# Patient Record
Sex: FEMALE | Race: WHITE | NOT HISPANIC OR LATINO | Employment: OTHER | URBAN - METROPOLITAN AREA
[De-identification: names, ages, dates, MRNs, and addresses within clinical notes are randomized per-mention and may not be internally consistent; named-entity substitution may affect disease eponyms.]

---

## 2017-05-30 RX ORDER — ESCITALOPRAM OXALATE 5 MG/1
TABLET ORAL
Qty: 30 TABLET | Refills: 2 | Status: SHIPPED | OUTPATIENT
Start: 2017-05-30 | End: 2017-07-03

## 2017-05-30 RX ORDER — ESCITALOPRAM OXALATE 5 MG/1
5 TABLET ORAL DAILY
Qty: 30 TABLET | Refills: 2 | Status: SHIPPED | OUTPATIENT
Start: 2017-05-30 | End: 2017-07-05 | Stop reason: SDUPTHER

## 2017-06-19 RX ORDER — OXYBUTYNIN CHLORIDE 5 MG/1
5 TABLET ORAL 3 TIMES DAILY
Qty: 90 TABLET | Refills: 2 | Status: SHIPPED | OUTPATIENT
Start: 2017-06-19 | End: 2017-10-02 | Stop reason: SDUPTHER

## 2017-06-19 RX ORDER — OXYBUTYNIN CHLORIDE 5 MG/1
1 TABLET ORAL 3 TIMES DAILY
COMMUNITY
End: 2017-06-19 | Stop reason: SDUPTHER

## 2017-07-03 RX ORDER — FUROSEMIDE 40 MG/1
1 TABLET ORAL
COMMUNITY
End: 2017-07-05 | Stop reason: SDUPTHER

## 2017-07-03 RX ORDER — DILTIAZEM HYDROCHLORIDE 90 MG/1
1 TABLET, FILM COATED ORAL 2 TIMES DAILY
COMMUNITY
Start: 2017-04-03 | End: 2017-07-05 | Stop reason: SDUPTHER

## 2017-07-03 RX ORDER — TERBINAFINE HYDROCHLORIDE 250 MG/1
1 TABLET ORAL DAILY
COMMUNITY
Start: 2017-02-20 | End: 2017-07-05

## 2017-07-03 RX ORDER — ATORVASTATIN CALCIUM 20 MG/1
1 TABLET, FILM COATED ORAL DAILY
COMMUNITY
Start: 2017-04-03 | End: 2017-07-05 | Stop reason: SDUPTHER

## 2017-07-03 RX ORDER — MINOCYCLINE HYDROCHLORIDE 100 MG/1
1 TABLET ORAL DAILY
COMMUNITY
End: 2017-07-05

## 2017-07-05 ENCOUNTER — OFFICE VISIT (OUTPATIENT)
Dept: FAMILY MEDICINE | Facility: CLINIC | Age: 72
End: 2017-07-05
Payer: MEDICARE

## 2017-07-05 VITALS
HEART RATE: 86 BPM | SYSTOLIC BLOOD PRESSURE: 130 MMHG | DIASTOLIC BLOOD PRESSURE: 74 MMHG | HEIGHT: 62 IN | OXYGEN SATURATION: 95 % | WEIGHT: 210 LBS | BODY MASS INDEX: 38.64 KG/M2

## 2017-07-05 DIAGNOSIS — E78.5 HYPERLIPIDEMIA, UNSPECIFIED HYPERLIPIDEMIA TYPE: ICD-10-CM

## 2017-07-05 DIAGNOSIS — F41.8 MIXED ANXIETY DEPRESSIVE DISORDER: Primary | ICD-10-CM

## 2017-07-05 DIAGNOSIS — R60.0 EDEMA OF LOWER EXTREMITY, UNSPECIFIED LATERALITY: ICD-10-CM

## 2017-07-05 DIAGNOSIS — I10 BENIGN HYPERTENSION: ICD-10-CM

## 2017-07-05 PROBLEM — F17.200 CURRENT SMOKER: Status: ACTIVE | Noted: 2017-07-05

## 2017-07-05 PROBLEM — B35.6 TINEA CRURIS: Status: ACTIVE | Noted: 2017-07-05

## 2017-07-05 PROBLEM — L98.9 SKIN LESION: Status: ACTIVE | Noted: 2017-07-05

## 2017-07-05 PROBLEM — Z86.03 HISTORY OF NEOPLASM OF BLADDER: Status: ACTIVE | Noted: 2017-07-05

## 2017-07-05 PROBLEM — B35.1 ONYCHOMYCOSIS OF TOENAIL: Status: ACTIVE | Noted: 2017-07-05

## 2017-07-05 PROBLEM — R73.03 BORDERLINE DIABETES MELLITUS: Status: ACTIVE | Noted: 2017-07-05

## 2017-07-05 PROCEDURE — 99214 OFFICE O/P EST MOD 30 MIN: CPT | Mod: ,,, | Performed by: FAMILY MEDICINE

## 2017-07-05 PROCEDURE — 1159F MED LIST DOCD IN RCRD: CPT | Mod: ,,, | Performed by: FAMILY MEDICINE

## 2017-07-05 RX ORDER — ESCITALOPRAM OXALATE 10 MG/1
10 TABLET ORAL DAILY
Qty: 90 TABLET | Refills: 1 | Status: SHIPPED | OUTPATIENT
Start: 2017-07-05 | End: 2018-07-05

## 2017-07-05 RX ORDER — DILTIAZEM HYDROCHLORIDE 90 MG/1
90 TABLET, FILM COATED ORAL 2 TIMES DAILY
Qty: 180 TABLET | Refills: 1 | Status: SHIPPED | OUTPATIENT
Start: 2017-07-05

## 2017-07-05 RX ORDER — FUROSEMIDE 40 MG/1
40 TABLET ORAL
Qty: 90 TABLET | Refills: 1 | Status: SHIPPED | OUTPATIENT
Start: 2017-07-05

## 2017-07-05 RX ORDER — ESCITALOPRAM OXALATE 5 MG/1
5 TABLET ORAL DAILY
Qty: 90 TABLET | Refills: 1 | Status: SHIPPED | OUTPATIENT
Start: 2017-07-05 | End: 2020-08-31

## 2017-07-05 RX ORDER — ATORVASTATIN CALCIUM 20 MG/1
20 TABLET, FILM COATED ORAL DAILY
Qty: 90 TABLET | Refills: 1 | Status: SHIPPED | OUTPATIENT
Start: 2017-07-05

## 2017-07-05 NOTE — PROGRESS NOTES
Subjective:       Patient ID:  Chen Levine is a 72 y.o. female with multiple medical diagnoses as listed in the medical history and problem list that presents for Hypertension; Anxiety; and Hyperlipidemia  .      Chief Complaint: Hypertension; Anxiety; and Hyperlipidemia    Hypertension   This is a chronic problem. The current episode started more than 1 year ago. The problem has been gradually improving since onset. Associated symptoms include anxiety. Pertinent negatives include no chest pain, headaches, palpitations or shortness of breath. There are no associated agents to hypertension. Risk factors for coronary artery disease include dyslipidemia. Past treatments include calcium channel blockers. The current treatment provides mild improvement. There are no compliance problems.    Anxiety   Presents for follow-up visit. Symptoms include nervous/anxious behavior. Patient reports no chest pain, confusion, dizziness, nausea, palpitations, shortness of breath or suicidal ideas. Symptoms occur most days. The severity of symptoms is moderate. The quality of sleep is fair. Nighttime awakenings: one to two.     Compliance with medications is %.   Hyperlipidemia   This is a chronic problem. The problem is uncontrolled. Recent lipid tests were reviewed and are high. There are no known factors aggravating her hyperlipidemia. Pertinent negatives include no chest pain, myalgias or shortness of breath. Current antihyperlipidemic treatment includes statins. The current treatment provides mild improvement of lipids. There are no compliance problems.      Review of Systems   Constitutional: Negative for appetite change, chills, diaphoresis, fatigue and fever.   HENT: Negative for congestion, ear pain, hearing loss, sore throat and trouble swallowing.    Eyes: Negative for photophobia, pain and visual disturbance.   Respiratory: Negative for cough, chest tightness and shortness of breath.    Cardiovascular:  Negative for chest pain, palpitations and leg swelling.   Gastrointestinal: Negative for abdominal pain, blood in stool, constipation, diarrhea, nausea and vomiting.   Endocrine: Negative for cold intolerance and heat intolerance.   Genitourinary: Negative for difficulty urinating, flank pain, pelvic pain and vaginal pain.   Musculoskeletal: Negative for arthralgias and myalgias.   Skin: Negative for rash.   Allergic/Immunologic: Negative for immunocompromised state.   Neurological: Negative for dizziness, weakness, light-headedness and headaches.   Hematological: Negative for adenopathy. Does not bruise/bleed easily.   Psychiatric/Behavioral: Negative for confusion, self-injury and suicidal ideas. The patient is nervous/anxious.        Past Medical History:   Diagnosis Date    Anxiety     Cancer     Depression     Hyperlipidemia     Hypertension      Past Surgical History:   Procedure Laterality Date    BLADDER SURGERY  1985    HYSTERECTOMY  1977    SPINE SURGERY  1987     Family History   Problem Relation Age of Onset    Cancer Mother     Vision loss Mother     Hypertension Father     Stroke Father      Social History     Social History    Marital status:      Spouse name: N/A    Number of children: N/A    Years of education: N/A     Social History Main Topics    Smoking status: Current Every Day Smoker    Smokeless tobacco: Never Used    Alcohol use No    Drug use: No    Sexual activity: Not Asked     Other Topics Concern    None     Social History Narrative    None     Current Outpatient Prescriptions   Medication Sig Dispense Refill    atorvastatin (LIPITOR) 20 MG tablet Take 1 tablet (20 mg total) by mouth Daily. 90 tablet 1    diltiaZEM (CARDIZEM) 90 MG tablet Take 1 tablet (90 mg total) by mouth 2 (two) times daily. 180 tablet 1    escitalopram oxalate (LEXAPRO) 5 MG Tab Take 1 tablet (5 mg total) by mouth once daily. 90 tablet 1    furosemide (LASIX) 40 MG tablet Take 1  tablet (40 mg total) by mouth 3 (three) times a week. 90 tablet 1    oxybutynin (DITROPAN) 5 MG Tab Take 1 tablet (5 mg total) by mouth 3 (three) times daily. 90 tablet 2    escitalopram oxalate (LEXAPRO) 10 MG tablet Take 1 tablet (10 mg total) by mouth once daily. 90 tablet 1     No current facility-administered medications for this visit.      Review of patient's allergies indicates:   Allergen Reactions    Penicillins Itching and Rash    Promethazine Itching and Rash    Sulfa (sulfonamide antibiotics) Itching and Rash     Objective:      Vitals:    07/05/17 1039   BP: 130/74   Pulse: 86     Physical Exam   Constitutional: She appears well-developed and well-nourished.   HENT:   Head: Normocephalic.   Eyes: Pupils are equal, round, and reactive to light. Right eye exhibits no discharge. Left eye exhibits no discharge. No scleral icterus.   Neck: No thyromegaly present.   Cardiovascular: Normal rate and regular rhythm.  Exam reveals no gallop and no friction rub.    No murmur heard.  Pulmonary/Chest: No respiratory distress. She has no wheezes. She has no rales. She exhibits no tenderness.   Abdominal: She exhibits no distension. There is no tenderness.   Musculoskeletal: She exhibits no tenderness.   Skin: No erythema.   Psychiatric: Her mood appears anxious. She expresses no suicidal ideation. She expresses no suicidal plans and no homicidal plans.       Assessment:       1. Mixed anxiety depressive disorder    2. Hyperlipidemia, unspecified hyperlipidemia type    3. Benign hypertension    4. Edema of lower extremity, unspecified laterality        Plan:       Mixed anxiety depressive disorder  Comments:  re-start SSRI.   Orders:  -     escitalopram oxalate (LEXAPRO) 10 MG tablet; Take 1 tablet (10 mg total) by mouth once daily.  Dispense: 90 tablet; Refill: 1  -     escitalopram oxalate (LEXAPRO) 5 MG Tab; Take 1 tablet (5 mg total) by mouth once daily.  Dispense: 90 tablet; Refill: 1    Hyperlipidemia,  unspecified hyperlipidemia type  Comments:  restart statin ( off for a while since use terbinafine).  Orders:  -     atorvastatin (LIPITOR) 20 MG tablet; Take 1 tablet (20 mg total) by mouth Daily.  Dispense: 90 tablet; Refill: 1    Benign hypertension  -     diltiaZEM (CARDIZEM) 90 MG tablet; Take 1 tablet (90 mg total) by mouth 2 (two) times daily.  Dispense: 180 tablet; Refill: 1    Edema of lower extremity, unspecified laterality  Comments:  improving after skin lesion on LE improved.   Orders:  -     furosemide (LASIX) 40 MG tablet; Take 1 tablet (40 mg total) by mouth 3 (three) times a week.  Dispense: 90 tablet; Refill: 1      Return in about 3 months (around 10/5/2017) for HTN, HLD, anxiety  with Dr. Ramirez. .      7/5/2017 Maryam Tripp M.D.

## 2017-10-04 RX ORDER — OXYBUTYNIN CHLORIDE 5 MG/1
TABLET ORAL
Qty: 90 TABLET | Refills: 0 | Status: SHIPPED | OUTPATIENT
Start: 2017-10-04 | End: 2019-09-03

## 2019-07-16 ENCOUNTER — OFFICE VISIT (OUTPATIENT)
Dept: HEMATOLOGY/ONCOLOGY | Facility: CLINIC | Age: 74
End: 2019-07-16
Payer: MEDICARE

## 2019-07-16 VITALS
SYSTOLIC BLOOD PRESSURE: 123 MMHG | HEART RATE: 73 BPM | DIASTOLIC BLOOD PRESSURE: 72 MMHG | BODY MASS INDEX: 43.68 KG/M2 | RESPIRATION RATE: 20 BRPM | HEIGHT: 60 IN | WEIGHT: 222.5 LBS | TEMPERATURE: 99 F

## 2019-07-16 DIAGNOSIS — K58.2 IRRITABLE BOWEL SYNDROME WITH BOTH CONSTIPATION AND DIARRHEA: ICD-10-CM

## 2019-07-16 DIAGNOSIS — I10 BENIGN HYPERTENSION: ICD-10-CM

## 2019-07-16 DIAGNOSIS — F41.8 MIXED ANXIETY DEPRESSIVE DISORDER: ICD-10-CM

## 2019-07-16 DIAGNOSIS — N28.89 RENAL MASS: ICD-10-CM

## 2019-07-16 DIAGNOSIS — Z86.03 HISTORY OF NEOPLASM OF BLADDER: ICD-10-CM

## 2019-07-16 DIAGNOSIS — R73.03 BORDERLINE DIABETES MELLITUS: ICD-10-CM

## 2019-07-16 DIAGNOSIS — Z80.3 FHX: BREAST CANCER IN FIRST DEGREE RELATIVE: ICD-10-CM

## 2019-07-16 DIAGNOSIS — F17.200 CURRENT SMOKER: ICD-10-CM

## 2019-07-16 DIAGNOSIS — Z12.31 ENCOUNTER FOR SCREENING MAMMOGRAM FOR BREAST CANCER: ICD-10-CM

## 2019-07-16 DIAGNOSIS — D72.9 NEUTROPHILIC LEUKOCYTOSIS: Primary | ICD-10-CM

## 2019-07-16 DIAGNOSIS — D50.9 IRON DEFICIENCY ANEMIA, UNSPECIFIED IRON DEFICIENCY ANEMIA TYPE: ICD-10-CM

## 2019-07-16 DIAGNOSIS — D51.8 ANEMIA OF DECREASED VITAMIN B12 ABSORPTION: ICD-10-CM

## 2019-07-16 DIAGNOSIS — R77.8 ELEVATED TOTAL PROTEIN: ICD-10-CM

## 2019-07-16 PROCEDURE — 1101F PR PT FALLS ASSESS DOC 0-1 FALLS W/OUT INJ PAST YR: ICD-10-PCS | Mod: ,,, | Performed by: INTERNAL MEDICINE

## 2019-07-16 PROCEDURE — 99204 PR OFFICE/OUTPT VISIT, NEW, LEVL IV, 45-59 MIN: ICD-10-PCS | Mod: ,,, | Performed by: INTERNAL MEDICINE

## 2019-07-16 PROCEDURE — 3074F PR MOST RECENT SYSTOLIC BLOOD PRESSURE < 130 MM HG: ICD-10-PCS | Mod: ,,, | Performed by: INTERNAL MEDICINE

## 2019-07-16 PROCEDURE — 3074F SYST BP LT 130 MM HG: CPT | Mod: ,,, | Performed by: INTERNAL MEDICINE

## 2019-07-16 PROCEDURE — 99204 OFFICE O/P NEW MOD 45 MIN: CPT | Mod: ,,, | Performed by: INTERNAL MEDICINE

## 2019-07-16 PROCEDURE — 1101F PT FALLS ASSESS-DOCD LE1/YR: CPT | Mod: ,,, | Performed by: INTERNAL MEDICINE

## 2019-07-16 PROCEDURE — 3078F PR MOST RECENT DIASTOLIC BLOOD PRESSURE < 80 MM HG: ICD-10-PCS | Mod: ,,, | Performed by: INTERNAL MEDICINE

## 2019-07-16 PROCEDURE — 3078F DIAST BP <80 MM HG: CPT | Mod: ,,, | Performed by: INTERNAL MEDICINE

## 2019-07-16 RX ORDER — GABAPENTIN 600 MG/1
600 TABLET ORAL 2 TIMES DAILY
COMMUNITY

## 2019-07-16 NOTE — LETTER
July 20, 2019      Adan Auguste MD  [address]  [place] [address]           [facility]  [address] [facility]  [address] [place] [id number]  Phone: [phone]  Fax: [phone]          Patient: Chen Levine   MR Number: [id number]   YOB: 1945   Date of Visit: 7/16/2019       Dear Dr. Adan Auguste:    Thank you for referring Chen Levine to me for evaluation. Attached you will find relevant portions of my assessment and plan of care.    If you have questions, please do not hesitate to call me. I look forward to following Chen Levine along with you.    Sincerely,    CARMEN Nunes MD    Enclosure  CC:  No Recipients    If you would like to receive this communication electronically, please contact [email or web address] or [phone] to request more information on [facility] access.    For providers and/or their staff who would like to refer a patient to [facility], please contact us through our one-stop-shop provider referral line, [facility], at [phone].    If you feel you have received this communication in error or would no longer like to receive these types of communications, please e-mail [email or web address]

## 2019-07-20 ENCOUNTER — TELEPHONE (OUTPATIENT)
Dept: HEMATOLOGY/ONCOLOGY | Facility: CLINIC | Age: 74
End: 2019-07-20

## 2019-07-21 NOTE — PROGRESS NOTES
[facility] History & Physical    Subjective:      Patient ID:   Chen Levine  74 y.o. female  1945  [place]      Chief Complaint:   WBC increased    HPI:  74 y.o. female with increased WBC 14,100.  She has spastic colitis hx with cramps.  HTN, asthma as a child.  Bladder cancer ,Dr. Stahl, treated with surgery.  She has stress incontinence.  DVT hx in her 20's.    S/P neck fusion.  LBP with injections, residual numbness down R anterior Leg.  Partial hysterectomy.  Onset of menses age 13.  M0.    Smoked 1/2 ppd x's 57 years.  She does not want referral to smoking clinic.ETOH no.  Allergy Phenergan, PCN, sulfa.  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    Mom breast Ca, macular degeneration.  Dad CVA.  Sister x's 2, LBP.  1 sister renal cancer.  Daughter Breast cancer, epilepsy, heart dx.  Daughter hypothyroid dx., cholesterol, 40# benign ovarian tumor.      ROS:   GEN: normal without any fever, night sweats or weight loss  HEENT: normal with no HA's, sore throat, stiff neck, changes in vision  CV: normal with no CP, SOB, PND, VIDES or orthopnea  PULM: normal with no SOB, cough, hemoptysis, sputum or pleuritic pain  GI: normal with no abdominal pain, nausea, vomiting, constipation, diarrhea, melanotic stools, BRBPR, or hematemesis  : normal with no hematuria, dysuria  BREAST: normal with no mass, discharge, pain  SKIN: normal with no rash, erythema, bruising, or swelling     Past Medical History:   Diagnosis Date    Anxiety     Cancer     Depression     Hyperlipidemia     Hypertension      Past Surgical History:   Procedure Laterality Date    BLADDER SURGERY      HYSTERECTOMY      SPINE SURGERY         Review of patient's allergies indicates:   Allergen Reactions    Penicillins Itching and Rash    Promethazine Itching and Rash    Sulfa (sulfonamide antibiotics) Itching and Rash           Current Outpatient Medications:     atorvastatin (LIPITOR) 20 MG tablet,  Take 1 tablet (20 mg total) by mouth Daily., Disp: 90 tablet, Rfl: 1    diltiaZEM (CARDIZEM) 90 MG tablet, Take 1 tablet (90 mg total) by mouth 2 (two) times daily., Disp: 180 tablet, Rfl: 1    furosemide (LASIX) 40 MG tablet, Take 1 tablet (40 mg total) by mouth 3 (three) times a week., Disp: 90 tablet, Rfl: 1    gabapentin (NEURONTIN) 600 MG tablet, Take 600 mg by mouth 3 (three) times daily., Disp: , Rfl:     oxybutynin (DITROPAN) 5 MG Tab, TAKE ONE TABLET (5MG TOTAL) BY MOUTH THREE TIMES DAILY, Disp: 90 tablet, Rfl: 0    escitalopram oxalate (LEXAPRO) 5 MG Tab, Take 1 tablet (5 mg total) by mouth once daily., Disp: 90 tablet, Rfl: 1          Objective:   Vitals:  Blood pressure 123/72, pulse 73, temperature 99 °F (37.2 °C), temperature source Oral, resp. rate 20, height 5' (1.524 m), weight 100.9 kg (222 lb 8 oz).    Physical Examination:   GEN: no apparent distress, comfortable  HEAD: atraumatic and normocephalic  EYES: no pallor, no icterus  ENT:  no pharyngeal erythema, external ears WNL; no nasal discharge  NECK: no masses, thyroid normal, trachea midline, no LAD/LN's, supple  CV: RRR with no murmur; normal pulse; normal S1 and S2; no pedal edema  CHEST: Normal respiratory effort; CTAB; normal breath sounds; no wheeze or crackles  ABDOM: nontender and nondistended; soft;  no rebound/guarding  MUSC/Skeletal: ROM normal; no crepitus; joints normal; no deformities   EXTREM: no clubbing, cyanosis, inflammation or swelling  SKIN: no rashes, lesions, ulcers, petechiae   : no cvat  NEURO: grossly intact; motor/sensory WNL;  no tremors  PSYCH: normal mood, affect and behavior  LYMPH: normal cervical, supraclavicular, axillary and groin LN's  BREASTS: L & R no palpable mass      Assessment:   (1) 74 y.o. female with diagnosis of WBC 14,100. Gradually increasing from 8,900 since 2/2017.  Slight increased monocytes.    (2)Consider leukemoid reaction, myeloproliferative syndrome including CML and CMML.    (3) 2  first degree relatives with Breast Cancer.  Discussed BRCA 1&2 testing.    Check CAT of abdomen to check L/S size.  Check U/S of kidney regards cyst or mass.    May come to Bone Marrow exam to check cellularity and maturation.    RTC 3 weeks.         I have explained and the patient understands all of  the current recommendation(s). I have answered all of their questions to the best of my ability and to their complete satisfaction.

## 2019-07-21 NOTE — TELEPHONE ENCOUNTER
Orders placed for lab, U/S of abdomen, CAT of abdomen and pelvis and Mamm  At , Please schedule for her.    RTC 1 month.

## 2019-08-07 ENCOUNTER — OFFICE VISIT (OUTPATIENT)
Dept: HEMATOLOGY/ONCOLOGY | Facility: CLINIC | Age: 74
End: 2019-08-07
Payer: MEDICARE

## 2019-08-07 VITALS
OXYGEN SATURATION: 95 % | WEIGHT: 222 LBS | SYSTOLIC BLOOD PRESSURE: 131 MMHG | HEART RATE: 78 BPM | DIASTOLIC BLOOD PRESSURE: 76 MMHG | TEMPERATURE: 98 F | BODY MASS INDEX: 43.36 KG/M2

## 2019-08-07 DIAGNOSIS — N28.89 RIGHT KIDNEY MASS: Primary | ICD-10-CM

## 2019-08-07 DIAGNOSIS — D72.829 LEUKOCYTOSIS, UNSPECIFIED TYPE: ICD-10-CM

## 2019-08-07 PROCEDURE — 3078F PR MOST RECENT DIASTOLIC BLOOD PRESSURE < 80 MM HG: ICD-10-PCS | Mod: S$GLB,,, | Performed by: INTERNAL MEDICINE

## 2019-08-07 PROCEDURE — 99214 PR OFFICE/OUTPT VISIT, EST, LEVL IV, 30-39 MIN: ICD-10-PCS | Mod: S$GLB,,, | Performed by: INTERNAL MEDICINE

## 2019-08-07 PROCEDURE — 3075F SYST BP GE 130 - 139MM HG: CPT | Mod: S$GLB,,, | Performed by: INTERNAL MEDICINE

## 2019-08-07 PROCEDURE — 3078F DIAST BP <80 MM HG: CPT | Mod: S$GLB,,, | Performed by: INTERNAL MEDICINE

## 2019-08-07 PROCEDURE — 3075F PR MOST RECENT SYSTOLIC BLOOD PRESS GE 130-139MM HG: ICD-10-PCS | Mod: S$GLB,,, | Performed by: INTERNAL MEDICINE

## 2019-08-07 PROCEDURE — 1101F PR PT FALLS ASSESS DOC 0-1 FALLS W/OUT INJ PAST YR: ICD-10-PCS | Mod: S$GLB,,, | Performed by: INTERNAL MEDICINE

## 2019-08-07 PROCEDURE — 1101F PT FALLS ASSESS-DOCD LE1/YR: CPT | Mod: S$GLB,,, | Performed by: INTERNAL MEDICINE

## 2019-08-07 PROCEDURE — 99214 OFFICE O/P EST MOD 30 MIN: CPT | Mod: S$GLB,,, | Performed by: INTERNAL MEDICINE

## 2019-08-07 NOTE — PROGRESS NOTES
[facility] History & Physical    Subjective:      Patient ID:   Chen Levine  74 y.o. female  1945  [place]      Chief Complaint:   WBC increased    HPI:  74 y.o. female with increased WBC 14,100.  On repeat determination the   WBC count was 12,600.  She has spastic colitis hx with cramps.  HTN, asthma as a child.  Bladder cancer ,Dr. Jose Stahl, treated with surgery.  She has stress incontinence.  DVT hx in her 20's.    S/P neck fusion.  LBP with injections, residual numbness down R anterior Leg.  Partial hysterectomy.  Onset of menses age 13.  M0.    TSH slightly increased at 5.43.  Dr. Valadez will review TSH and make decisions regards need for synthroid or not?    CAT and U/S confirm 5.3 x 4.8 cm mass R kidney.  Per radiologist 80% probability of malignancy.  Refer to Dr. Mondragon of  for evaluation.  Her sister had nephrectomy for renal cancer.    Mamm 19 NL.    Smoked 1/2 ppd x's 57 years.  She does not want referral to smoking clinic.ETOH no.  Allergy Phenergan, PCN, sulfa.  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    Mom breast Ca, macular degeneration.  Dad CVA.  Sister x's 2, LBP.  1 sister renal cancer.  Daughter Breast cancer, epilepsy, heart dx.  Daughter hypothyroid dx., cholesterol, 40# benign ovarian tumor.      ROS:   GEN: normal without any fever, night sweats or weight loss  HEENT: normal with no HA's, sore throat, stiff neck, changes in vision  CV: normal with no CP, SOB, PND, VIDES or orthopnea  PULM: normal with no SOB, cough, hemoptysis, sputum or pleuritic pain  GI: normal with no abdominal pain, nausea, vomiting, constipation, diarrhea, melanotic stools, BRBPR, or hematemesis  : See HPI  BREAST: normal with no mass, discharge, pain  SKIN: normal with no rash, erythema, bruising, or swelling     Past Medical History:   Diagnosis Date    Anxiety     Cancer     Depression     Hyperlipidemia     Hypertension      Past Surgical History:   Procedure  Laterality Date    BLADDER SURGERY  1985    HYSTERECTOMY  1977    SPINE SURGERY  1987       Review of patient's allergies indicates:   Allergen Reactions    Penicillins Itching and Rash    Promethazine Itching and Rash    Sulfa (sulfonamide antibiotics) Itching and Rash           Current Outpatient Medications:     atorvastatin (LIPITOR) 20 MG tablet, Take 1 tablet (20 mg total) by mouth Daily., Disp: 90 tablet, Rfl: 1    diltiaZEM (CARDIZEM) 90 MG tablet, Take 1 tablet (90 mg total) by mouth 2 (two) times daily., Disp: 180 tablet, Rfl: 1    escitalopram oxalate (LEXAPRO) 5 MG Tab, Take 1 tablet (5 mg total) by mouth once daily., Disp: 90 tablet, Rfl: 1    furosemide (LASIX) 40 MG tablet, Take 1 tablet (40 mg total) by mouth 3 (three) times a week., Disp: 90 tablet, Rfl: 1    gabapentin (NEURONTIN) 600 MG tablet, Take 600 mg by mouth 3 (three) times daily., Disp: , Rfl:     oxybutynin (DITROPAN) 5 MG Tab, TAKE ONE TABLET (5MG TOTAL) BY MOUTH THREE TIMES DAILY, Disp: 90 tablet, Rfl: 0          Objective:   Vitals:  There were no vitals taken for this visit.    Physical Examination:   GEN: no apparent distress, comfortable  HEAD: atraumatic and normocephalic  EYES: no pallor, no icterus  ENT:  no pharyngeal erythema, external ears WNL; no nasal discharge  NECK: no masses, thyroid normal, trachea midline, no LAD/LN's, supple  CV: RRR with no murmur; normal pulse; normal S1 and S2; no pedal edema  CHEST: Normal respiratory effort; CTAB; normal breath sounds; no wheeze or crackles  ABDOM: nontender and nondistended; soft;  no rebound/guarding  MUSC/Skeletal: ROM normal; no crepitus; joints normal; no deformities   EXTREM: no clubbing, cyanosis, inflammation or swelling  SKIN: no rashes, lesions, ulcers, petechiae   : no cvat  NEURO: grossly intact; motor/sensory WNL;  no tremors  PSYCH: normal mood, affect and behavior  LYMPH: normal cervical, supraclavicular, axillary and groin LN's  BREASTS: L & R no  palpable mass    WBC 12,600., IgA 550,  B12, Folate, ferritin NL.  B6 2.7., Epo 19.    Assessment:   (1) 74 y.o. female with diagnosis of WBC 14,100. Gradually increasing from 8,900 since 2/2017.  Slight increased monocytes.  On repeat determination the WBC 12,600.  B6 is low at 2.7.  Begin B6 50 mg daily and re check CBC in 3-4 months.  Defer bone marrow exam for now.    (2)Consider leukemoid reaction, myeloproliferative syndrome including CML and CMML.    (3) 2 first degree relatives with Breast Cancer.  Discussed BRCA 1&2 testing.    (4)TSH increased 5.43, Dr. Valadez to review thyroid status.    (5)neuropathy sx at R anterior thigh.  Dr. Valadez aware, and to evaluate later, when pt agrees.    RTC 1 month.    (5)R renal mass, may be malignant, refer to Dr. Mondragon for surgery evaluation.    RTC 3 weeks.         I have explained and the patient understands all of  the current recommendation(s). I have answered all of their questions to the best of my ability and to their complete satisfaction.

## 2019-08-08 ENCOUNTER — TELEPHONE (OUTPATIENT)
Dept: UROLOGY | Facility: CLINIC | Age: 74
End: 2019-08-08

## 2019-08-08 NOTE — TELEPHONE ENCOUNTER
Spoke w pt regarding urological history and reason for visit pt voiced states she is seeing us for Kidney lesion/or mass pt stated she had imaging done at [place]. Pt was informed will need imaging on disc and to drop off before visit pt voiced ok and understanding. Pt stated that she has seen a urologist in [facility] in the 80's Dr Stahl. Again pt was given instructions to bring in imaging before appt and confirmed appt and time along with address pt agreed.

## 2019-08-13 ENCOUNTER — APPOINTMENT (OUTPATIENT)
Dept: LAB | Facility: HOSPITAL | Age: 74
End: 2019-08-13
Attending: UROLOGY
Payer: MEDICARE

## 2019-08-13 ENCOUNTER — OFFICE VISIT (OUTPATIENT)
Dept: UROLOGY | Facility: CLINIC | Age: 74
End: 2019-08-13
Payer: MEDICARE

## 2019-08-13 VITALS — BODY MASS INDEX: 44.21 KG/M2 | WEIGHT: 225.19 LBS | RESPIRATION RATE: 18 BRPM | HEIGHT: 60 IN

## 2019-08-13 DIAGNOSIS — Z85.51 HISTORY OF BLADDER CANCER: ICD-10-CM

## 2019-08-13 DIAGNOSIS — R82.998 CELLS AND CASTS IN URINE: ICD-10-CM

## 2019-08-13 DIAGNOSIS — R58 BLEEDING: ICD-10-CM

## 2019-08-13 DIAGNOSIS — N28.89 RIGHT RENAL MASS: Primary | ICD-10-CM

## 2019-08-13 LAB
BILIRUB SERPL-MCNC: NORMAL MG/DL
BLOOD URINE, POC: NORMAL
COLOR, POC UA: YELLOW
GLUCOSE UR QL STRIP: NORMAL
KETONES UR QL STRIP: NORMAL
LEUKOCYTE ESTERASE URINE, POC: NORMAL
NITRITE, POC UA: NORMAL
PH, POC UA: 6
PROTEIN, POC: NORMAL
SPECIFIC GRAVITY, POC UA: 1.01
UROBILINOGEN, POC UA: 1

## 2019-08-13 PROCEDURE — 99205 PR OFFICE/OUTPT VISIT, NEW, LEVL V, 60-74 MIN: ICD-10-PCS | Mod: 25,S$GLB,, | Performed by: UROLOGY

## 2019-08-13 PROCEDURE — 99205 OFFICE O/P NEW HI 60 MIN: CPT | Mod: 25,S$GLB,, | Performed by: UROLOGY

## 2019-08-13 PROCEDURE — 99499 RISK ADDL DX/OHS AUDIT: ICD-10-PCS | Mod: S$GLB,,, | Performed by: UROLOGY

## 2019-08-13 PROCEDURE — 88112 CYTOPATH CELL ENHANCE TECH: CPT | Performed by: PATHOLOGY

## 2019-08-13 PROCEDURE — 99999 PR PBB SHADOW E&M-EST. PATIENT-LVL IV: CPT | Mod: PBBFAC,,, | Performed by: UROLOGY

## 2019-08-13 PROCEDURE — 1101F PT FALLS ASSESS-DOCD LE1/YR: CPT | Mod: CPTII,S$GLB,, | Performed by: UROLOGY

## 2019-08-13 PROCEDURE — 99499 UNLISTED E&M SERVICE: CPT | Mod: S$GLB,,, | Performed by: UROLOGY

## 2019-08-13 PROCEDURE — 88112 CYTOPATH CELL ENHANCE TECH: CPT | Mod: 26,,, | Performed by: PATHOLOGY

## 2019-08-13 PROCEDURE — 1101F PR PT FALLS ASSESS DOC 0-1 FALLS W/OUT INJ PAST YR: ICD-10-PCS | Mod: CPTII,S$GLB,, | Performed by: UROLOGY

## 2019-08-13 PROCEDURE — 81002 POCT URINE DIPSTICK WITHOUT MICROSCOPE: ICD-10-PCS | Mod: S$GLB,,, | Performed by: UROLOGY

## 2019-08-13 PROCEDURE — 99999 PR PBB SHADOW E&M-EST. PATIENT-LVL IV: ICD-10-PCS | Mod: PBBFAC,,, | Performed by: UROLOGY

## 2019-08-13 PROCEDURE — 88112 CYTOLOGY SPECIMEN-URINE: ICD-10-PCS | Mod: 26,,, | Performed by: PATHOLOGY

## 2019-08-13 PROCEDURE — 81002 URINALYSIS NONAUTO W/O SCOPE: CPT | Mod: S$GLB,,, | Performed by: UROLOGY

## 2019-08-13 RX ORDER — PYRIDOXINE HCL (VITAMIN B6) 100 MG
50 TABLET ORAL DAILY
COMMUNITY

## 2019-08-13 RX ORDER — CIPROFLOXACIN 2 MG/ML
400 INJECTION, SOLUTION INTRAVENOUS
Status: CANCELLED | OUTPATIENT
Start: 2019-08-13

## 2019-08-13 NOTE — PROGRESS NOTES
[facility] New Patient/H&P:    Chen Levine is a 74 y.o. female who presents for evaluation of right renal mass at referral of Dr Nunes.    She has been worked up by hematology for leukocytosis and found to have WBC 14k. Has spastic colitis hx with cramps. HTN, asthma as a child.  Bladder cancer 1984,Dr. Stahl, treated with surgery. She has stress incontinence. DVT hx in her 20's. Partial hysterectomy  S/P neck fusion.  LBP with injections, residual numbness down R anterior Leg.  Smoked 1/2 ppd x's 57 years. Allergy Phenergan, PCN, sulfa. 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  Mom breast Ca, macular degeneration. Dad CVA. Sister x's 2, LBP. 1 sister renal cancer.  Daughter Breast cancer, epilepsy, heart dx. Daughter hypothyroid dx., cholesterol, 40# benign ovarian tumor    CT and U/S confirm 5.3 x 4.8 cm mass R kidney. Referred for evaluation. Her sister had nephrectomy for renal cancer.  Slight increased monocytes.  On repeat determination the WBC 12,600. B6 is low at 2.7.  Begin B6 50 mg daily and re check CBC in 3-4 months. Defer bone marrow exam for now.     Abdominal US 7/28/19:  Gallbladder is well-distended demonstrating evidence of a small echogenic stone identified in the region of the gallbladder neck. There is minimal shadowing emanating from the posterior elements. No significant wall thickening or surrounding pericholecystic fluid. Caliber of the common duct appears normal. There appears within normal limits. The right kidney shows evidence of longest dimension of 11.4 cm with fairly well-defined hyperechoic zone arising from the inner hilar region that is concerning for a very mass. This measures on the order of 4.1 cm in its towards the hilar contour. Further diagnostic evaluation with cross-sectional imaging utilizing CT format is highly advised. The left kidney measures 11.2 cm in length. The spleen appears under size measuring 8.7 cm in long axis with evidence of  normal outline and echogenicity.    CT 7/30/19:  There is indeed a isodense mass in the inferior right kidney. Postcontrast multiphasic scan was performed using 100 cc of intravenous Omnipaque 350 and it shows marked enhancement of the mass with central necrosis. The mass measures 48 x 53 mm in diameter along its transverse and anteroposterior dimensions respectively. There is no extension into the perinephric space. No extension or tumor thrombus noted into the renal vein on the inferior vena cava. There are also no enlarged para-aortic retroperitoneal lymph nodes.  In the rest of the kidneys, no additional renal masses are renal abnormality is seen. No abnormal enhancement noted in the rest of the abdominal viscera with no evidence to suggest liver metastasis. There is normal opacification of the portal vein. The ureters and the urinary bladder appear normal. Patient has had a hysterectomy in the past. There is no bowel wall thickening or dilatation. There are no other intra-abdominal masses, abnormal fluid collections, inflammatory changes or ventral hernia. There are no osteoblastic or osteoclastic skeletal changes. Sclerosis along the left anterior iliac crest represents a bone graft donor site for cervical fusion in the past.  - There is indeed a solid enhancing mass with central necrosis, in the lower right kidney., The statistical probability of this representing a primary malignancy is at least 80%. There is no identifiable tumor thrombus in the renal vein or inferior vena cava. No evidence of distant metastasis in the visualized portions of the abdomen, pelvis and lower lungs    On review, was noted as incidental finding on L-spine MRI in 9/2018:  There is a 4 cm heterogeneous increased T2 signal low T1 signal mass in the mid right kidney which may represent complex cyst. Correlation with ultrasound is recommended  Renal US was ordered to evaluate, and CT was ordered to eval liver/spleen given hematologic  "workup - both imaging studies revealed renal mass as above  1/2ppd smoker since age 17, and now 3/4 ppd smoker since care home in 2008  Retired from supervisor sat [employer]  On B6 tablets first before further heme workup  Past cardiology eval: Dr Fuentes - about 1.5 years ago - worried about circulation for LE circulation from ulcers/infection. Reports told her heart was good.  Takes lasix for lower extremity edema.  Is not taking oxybutynin - hasnt since initial rx in 2017 as wasn't helping  Denies urgency/frequency during the day. At night when sleeping doesn't get urge to urinate, and when sits up to go may leak. Not every night. No ELIANA during the day.  Denies all LUTS or incontinence during the day. Only during sleep, may leak with sitting up - this will not happen during day  No blood in urine since diagnosis of bladder cancer in the 1980s.  This was resected and she had multiple negative cystoscopes for years until she was told further surveillance was not needed.  Has colitis, had ulcers removed endoscopically with Dr Barnes. "attacks" less frequent now 2x/yr vs monthly.        Past Medical History:   Diagnosis Date    Anxiety     Cancer     Depression     Hyperlipidemia     Hypertension        Past Surgical History:   Procedure Laterality Date    BLADDER SURGERY  1985    c-spine fusion      HYSTERECTOMY  1977    SPINE SURGERY  1987       Family History   Problem Relation Age of Onset    Cancer Mother     Vision loss Mother     Hypertension Father     Stroke Father        Social History     Socioeconomic History    Marital status:      Spouse name: Not on file    Number of children: Not on file    Years of education: Not on file    Highest education level: Not on file   Occupational History    Not on file   Social Needs    Financial resource strain: Not on file    Food insecurity:     Worry: Not on file     Inability: Not on file    Transportation needs:     Medical: " Not on file     Non-medical: Not on file   Tobacco Use    Smoking status: Current Every Day Smoker    Smokeless tobacco: Never Used   Substance and Sexual Activity    Alcohol use: No    Drug use: No    Sexual activity: Not on file   Lifestyle    Physical activity:     Days per week: Not on file     Minutes per session: Not on file    Stress: Not on file   Relationships    Social connections:     Talks on phone: Not on file     Gets together: Not on file     Attends Roman Catholic service: Not on file     Active member of club or organization: Not on file     Attends meetings of clubs or organizations: Not on file     Relationship status: Not on file   Other Topics Concern    Not on file   Social History Narrative    Not on file       Review of patient's allergies indicates:   Allergen Reactions    Penicillins Itching and Rash    Promethazine Itching and Rash    Sulfa (sulfonamide antibiotics) Itching and Rash       Medications Reviewed: see MAR    ROS:    Constitutional: denies fevers, chills, night sweats, fatigue, malaise  Respiratory: negative for cough, shortness of breath, wheezing, dyspnea.  Cardiovascular: + for high blood pressure, negative for chest pain, varicose veins, ankle swelling, palpitations, syncope.  GI: negative for abdominal pain, heartburn, indigestion, nausea, vomiting, constipation, diarrhea, blood in stool.   Urology: as noted above in HPI  Endocrinology: negative for cold intolerance, excessive thirst, not feeling tired/sluggish, no heat intolerance.   Hematology/Lymph: negative for easy bleeding, easy bruising, swollen glands.  Musculoskeletal: negative for back pain, joint pain, joint swelling, neck pain.  Allergy-Immunology: negative for seasonal allergies, negative for unusual infections.   Skin: negative for boils, breast lumps, hives, itching, rash.   Neurology: negative for, dizziness, headache, tingling/numbness, tremors.   Psych: + anxiety, negative for depression, suicidal  thoughts.     PHYSICAL EXAM:    Vitals:    08/13/19 0935   Resp: 18     Body mass index is 43.98 kg/m². Weight: 102.2 kg (225 lb 3.2 oz) Height: 5' (152.4 cm)       General: Alert, cooperative, no distress, appears stated age  Head: Normocephalic, without obvious abnormality, atraumatic  Neck: no masses, no thyromegaly, no lymphadenopathy  Eyes: PERRL, conjunctiva/corneas clear  Lungs: Respirations unlabored, normal effort, no accessory muscle use  CV: Warm and well perfused extremities  Abdomen: Soft, non-tender, no CVA tenderness, no hepatosplenomegaly, no hernia, 1+ pannus  Extremities: Extremities normal, atraumatic, no cyanosis or edema  Skin: Normal color, texture, and turgor, no rashes or lesions  Psych: Appropriate, well oriented, normal affect, normal mood  Neuro: Non-focal      LABS:    Recent Results (from the past 336 hour(s))   POCT URINE DIPSTICK WITHOUT MICROSCOPE    Collection Time: 08/13/19  9:51 AM   Result Value Ref Range    Color, UA yellow     Spec Grav UA 1.010     pH, UA 6     WBC, UA neg     Nitrite, UA neg     Protein neg     Glucose, UA neg     Ketones, UA neg     Urobilinogen, UA 1.0     Bilirubin neg     Blood, UA neg          Assessment/Diagnosis:  1. Right renal mass  POCT URINE DIPSTICK WITHOUT MICROSCOPE    Diet NPO    Case Request Operating Room: ROBOTIC NEPHRECTOMY    Admit to Inpatient    Insert peripheral IV    Reason for No Pharmacological VTE Prophylaxis    Place MARK hose    Place sequential compression device    Basic metabolic panel    CBC auto differential    Urinalysis    Type & Screen    Prepare RBC 2 Units; preop    EKG 12-lead    X-Ray Chest PA And Lateral     Urine culture     Protime-INR   2. History of bladder cancer  Cytology, urine         Plans:  I did review imaging with the patient and delineated her large right cetral/hilar renal mass.  We did discuss that approximately 80% of enhancing renal masses represent a renal cell carcinoma, and surgical excision of these  masses is the preferred treatment strategy, with nephron-sparing approach/partial nephrectomy preferred when possible. Her cT1b left renal mass does appear solid enhancing and consistent with an RCC. However mass is large and central and extends to the hilum and collecting system of the kidney, and crosses interpolar lines.  We did discuss anatomic challenges to partial nephrectomy, and given these characteristics, nephrectomy would be recommended.  We discussed robot-assisted laparoscopic approach in contrast to open approach, and after much discussion she elected to proceed with robotic/lap left nephrectomy.    She does have delayed phase imaging delineating the collecting system of which the mass shows extrinsic compression and seems to extend from the parenchyma most consistent with an RCC.  We did discuss the differences between renal cell carcinoma and urothelial carcinoma.  Though she has a history remotely of bladder cancer, she had it resected with negative surveillance and has no evidence of blood in her urine.  I did advise that I would send a urine cytology, and if there is any atypia, could discuss possible retrograde pyelogram and ureteroscopic evaluation, however with good CT urogram images RPG is unlikely to add any information. She does also have a primary relative with renal cell carcinoma. If cytology negative, would proceed with radical nephrectomy.       Risks and benefits of right radical nephrectomy discussed in detail, including but not limited to pain, infection, bleeding, scarring, injury to surrounding structures (liver, duodenum, colon, diaphragm, etc), hernia, postoperative ileus, declining renal function, need for conversion to open procedure. Also discussed adrenal sparing vs non, and potential effect on blood pressure if adrenal removed. All questions patient had were answered in detail and appropriate informed consent was obtained.     Right robotic radical nephrectomy scheduled  for 9/18/19.   CXR to be performed at preop visit to complete staging, and if any concerning lesions on CXR can follow with CT chest.  As well given her comorbidities, age, and obseity she will need medical and cardiac preop workup, optimization, and clearance. She does understand her increased risk of complications 2/2 to comorbidities. I also advised that she establish care with nephrology so can discuss optimization of renal function longterm given age and comorbidities which place her at risk of CKD despite good parenchyma and normal appearance of contralateral kidney, and referral placed.  Risk benefit discussion about proceeding with nuclear medicine Mag 3 renal scan to determine split differential function, however at risk purely given age and comorbidities, so would need nephrologic evaluation regardless. Mag3 deferred.    PAT 10-14d prior with T/C, Ucx  Preop Medical Clearance: Dr Madrid  Preop Cardiac Clearance: Dr Moss    60 mins spent in encounter, over half in extensive counseling, and answering all questions.

## 2019-08-13 NOTE — Clinical Note
shouldve already had medical clearance requested from madi and cards from irina and referral placed to est nephrology as per wrap up notePlease send note to all 3 as adjunct to requests and f.u status of all.

## 2019-08-13 NOTE — LETTER
August 23, 2019      CARMEN Nunes MD  [address]  [address]  [place] [address]           [place] [facility]  [address] [place] [address] [address]  [place] [address]  Phone: [phone]  Fax: [phone]          Patient: Chen Levine   MR Number: [id number]   YOB: 1945   Date of Visit: 8/13/2019       Dear Dr. CARMEN Nunes:    Thank you for referring Chen Levine to me for evaluation. Attached you will find relevant portions of my assessment and plan of care.    If you have questions, please do not hesitate to call me. I look forward to following Chen Levine along with you.    Sincerely,    Manny Mondragon MD    Enclosure  CC:  No Recipients    If you would like to receive this communication electronically, please contact [email or web address] or [phone] to request more information on [facility] access.    For providers and/or their staff who would like to refer a patient to [facility], please contact us through our one-stop-shop provider referral line, [facility], at [phone].    If you feel you have received this communication in error or would no longer like to receive these types of communications, please e-mail [email or web address]

## 2019-08-24 NOTE — PROGRESS NOTES
Patient, Chen Levine (MRN #[id number]), presented with a recorded BMI of 43.98 kg/m^2 consistent with the definition of morbid obesity (ICD-10 E66.01). The patient's morbid obesity was monitored, evaluated, addressed and/or treated. This addendum to the medical record is made on 08/24/2019.

## 2019-09-03 ENCOUNTER — HOSPITAL ENCOUNTER (OUTPATIENT)
Dept: RADIOLOGY | Facility: HOSPITAL | Age: 74
Discharge: HOME | End: 2019-09-03
Attending: UROLOGY
Payer: MEDICARE

## 2019-09-03 ENCOUNTER — HOSPITAL ENCOUNTER (OUTPATIENT)
Dept: PREADMISSION TESTING | Facility: HOSPITAL | Age: 74
Discharge: HOME | End: 2019-09-03
Attending: UROLOGY
Payer: MEDICARE

## 2019-09-03 VITALS — WEIGHT: 216 LBS | HEIGHT: 62 IN | BODY MASS INDEX: 39.75 KG/M2

## 2019-09-03 DIAGNOSIS — N28.89 RIGHT RENAL MASS: ICD-10-CM

## 2019-09-03 PROCEDURE — 99900104 DSU ONLY-NO CHARGE-EA ADD'L HR (STAT)

## 2019-09-03 PROCEDURE — 71046 XR CHEST PA AND LATERAL: ICD-10-PCS | Mod: 26,,, | Performed by: RADIOLOGY

## 2019-09-03 PROCEDURE — 71046 X-RAY EXAM CHEST 2 VIEWS: CPT | Mod: TC,FY

## 2019-09-03 PROCEDURE — 71046 X-RAY EXAM CHEST 2 VIEWS: CPT | Mod: 26,,, | Performed by: RADIOLOGY

## 2019-09-03 PROCEDURE — 99900103 DSU ONLY-NO CHARGE-INITIAL HR (STAT)

## 2019-09-03 NOTE — DISCHARGE INSTRUCTIONS
To confirm, Your doctor has instructed you that surgery is scheduled for: 9/18/19   RIGOBERTO  [phone]    Please report to [facility] the morning of surgery. You must check-in and receive a wristband before going to your procedure.    Pre-Op will call the afternoon prior to surgery between 1:00 and 6:00 PM with the final arrival time.  Phone number: [phone]    PLEASE NOTE:  The surgery schedule has many variables which may affect the time of your surgery case.  Family members should be available if your surgery time changes.  Plan to be here the day of your procedure between 4-6 hours.    MEDICATIONS:  TAKE ONLY THESE MEDICATIONS WITH A SMALL SIP OF WATER THE MORNING OF YOUR PROCEDURE:  ATORVASTATIN,  DILTIAZEM, GABAPENTIN    DO NOT TAKE THESE MEDICATIONS 5-7 DAYS PRIOR to your procedure or per your surgeon's request: ASPIRIN, ALEVE, ADVIL, IBUPROFEN, FISH OIL VITAMIN E, HERBALS  (May take Tylenol)    ONLY if you are prescribed any types of blood thinners such as:  Aspirin, Coumadin, Plavix, Pradaxa, Xarelto, Aggrenox, Effient, Eliquis, Savasya, Brilinta, or any other, ask your surgeon whether you should stop taking them and how long before surgery you should stop.  You may also need to verify with the prescribing physician if it is ok to stop your medication.      INSTRUCTIONS IMPORTANT!!  · Do not eat or drink anything between midnight and the time of your procedure- this includes gum, mints, and candy.  · Do not smoke or drink alcoholic beverages 24 hours prior to your procedure.  · Shower the night before AND the morning of your procedure with a Chlorhexidine wash such as Hibiclens or Dial antibacterial soap from the neck down.  Do not get it on your face or in your eyes.  You may use your own shampoo and face wash. This helps your skin to be as bacteria free as possible.    · If you wear contact lenses, dentures, hearing aids or glasses, bring a container to put them  in during surgery and give to a family member for safe keeping.  Please leave all jewelry, piercing's and valuables at home.   · DO NOT remove hair from the surgery site.  Do not shave the incision site unless you are given specific instructions to do so.    · ONLY if you have been diagnosed with sleep apnea please bring your C-PAP machine.  · ONLY if you wear home oxygen please bring your portable oxygen tank the day of your procedure.  · ONLY if you have a history of OPEN HEART SURGERY you will need a clearance from your Cardiologist per Anesthesia.      · ONLY for patients requiring bowel prep, written instructions will be given by your doctor's office.  · ONLY if you have a neuro stimulator, please bring the controller with you the morning of surgery  · ONLY if a type and screen test is needed before surgery, please return:  · If your doctor has scheduled you for an overnight stay, bring a small overnight bag with any personal items you need.  · Make arrangements in advance for transportation home by a responsible adult.  It is not safe to drive a vehicle during the 24 hours after anesthesia.      · Visiting hours are 10:00AM to 8:30PM.  For the safety of all patients, visitors under the age of 12 are not allowed above the first floor of the hospital.    · All [facility] facilities and properties are tobacco free.  Smoking is NOT allowed.       If you have any questions about these instructions, call Pre-Op Admit  Nursing at [phone] or the Pre-Op Day Surgery Unit at [phone].

## 2019-09-04 ENCOUNTER — OFFICE VISIT (OUTPATIENT)
Dept: HEMATOLOGY/ONCOLOGY | Facility: CLINIC | Age: 74
End: 2019-09-04
Payer: MEDICARE

## 2019-09-04 VITALS
TEMPERATURE: 99 F | SYSTOLIC BLOOD PRESSURE: 123 MMHG | BODY MASS INDEX: 40.97 KG/M2 | DIASTOLIC BLOOD PRESSURE: 73 MMHG | OXYGEN SATURATION: 95 % | WEIGHT: 224 LBS | HEART RATE: 68 BPM

## 2019-09-04 DIAGNOSIS — D72.829 LEUKOCYTOSIS, UNSPECIFIED TYPE: ICD-10-CM

## 2019-09-04 DIAGNOSIS — I10 BENIGN HYPERTENSION: ICD-10-CM

## 2019-09-04 DIAGNOSIS — N28.89 RIGHT KIDNEY MASS: Primary | ICD-10-CM

## 2019-09-04 PROCEDURE — 3078F DIAST BP <80 MM HG: CPT | Mod: S$GLB,,, | Performed by: INTERNAL MEDICINE

## 2019-09-04 PROCEDURE — 3074F PR MOST RECENT SYSTOLIC BLOOD PRESSURE < 130 MM HG: ICD-10-PCS | Mod: S$GLB,,, | Performed by: INTERNAL MEDICINE

## 2019-09-04 PROCEDURE — 99214 OFFICE O/P EST MOD 30 MIN: CPT | Mod: S$GLB,,, | Performed by: INTERNAL MEDICINE

## 2019-09-04 PROCEDURE — 99214 PR OFFICE/OUTPT VISIT, EST, LEVL IV, 30-39 MIN: ICD-10-PCS | Mod: S$GLB,,, | Performed by: INTERNAL MEDICINE

## 2019-09-04 PROCEDURE — 1101F PT FALLS ASSESS-DOCD LE1/YR: CPT | Mod: S$GLB,,, | Performed by: INTERNAL MEDICINE

## 2019-09-04 PROCEDURE — 1101F PR PT FALLS ASSESS DOC 0-1 FALLS W/OUT INJ PAST YR: ICD-10-PCS | Mod: S$GLB,,, | Performed by: INTERNAL MEDICINE

## 2019-09-04 PROCEDURE — 3078F PR MOST RECENT DIASTOLIC BLOOD PRESSURE < 80 MM HG: ICD-10-PCS | Mod: S$GLB,,, | Performed by: INTERNAL MEDICINE

## 2019-09-04 PROCEDURE — 3074F SYST BP LT 130 MM HG: CPT | Mod: S$GLB,,, | Performed by: INTERNAL MEDICINE

## 2019-09-08 NOTE — PROGRESS NOTES
[facility] History & Physical    Subjective:      Patient ID:   Chen Levine  74 y.o. female  1945  Alanna Valadez      Chief Complaint:   WBC increased    HPI:  74 y.o. female with increased WBC 14,100.  On repeat determination the   WBC count was 12,600.  Now 13,000.  She has spastic colitis hx with cramps.  HTN, asthma as a child.  Bladder cancer ,Dr. Jose Stahl, treated with surgery.  She has stress incontinence.  DVT hx in her 20's.    B6 was low, started on B 6 po.  Energy 5/10.    S/P neck fusion.  LBP with injections, residual numbness down R anterior Leg.  Partial hysterectomy.  Onset of menses age 13.  M0.    TSH slightly increased at 5.43.  Dr. Valadez will review TSH and make decisions regards need for synthroid or not?    CAT and U/S confirm 5.3 x 4.8 cm mass R kidney.  Per radiologist 80% probability of malignancy.  Refer to Dr. Mondragon of  for evaluation.  Her sister had nephrectomy for renal cancer.    She is due for R renal mass surgery 19.    Mamm 19 NL.    Smoked 1/2 ppd x's 57 years.  She does not want referral to smoking clinic.ETOH no.  Allergy Phenergan, PCN, sulfa.  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    Mom breast Ca, macular degeneration.  Dad CVA.  Sister x's 2, LBP.  1 sister renal cancer.  Daughter Breast cancer, epilepsy, heart dx.  Daughter hypothyroid dx., cholesterol, 40# benign ovarian tumor.      ROS:   GEN: normal without any fever, night sweats or weight loss  HEENT: normal with no HA's, sore throat, stiff neck, changes in vision  CV: normal with no CP, SOB, PND, VIDES or orthopnea  PULM: normal with no SOB, cough, hemoptysis, sputum or pleuritic pain  GI: normal with no abdominal pain, nausea, vomiting, constipation, diarrhea, melanotic stools, BRBPR, or hematemesis  : See HPI  BREAST: normal with no mass, discharge, pain  SKIN: normal with no rash, erythema, bruising, or swelling     Past Medical History:   Diagnosis Date     Anxiety     Cancer     Depression     Hyperlipidemia     Hypertension     Wears partial dentures     UPPER PARTIAL     Past Surgical History:   Procedure Laterality Date    BLADDER SURGERY  1985    c-spine fusion      HYSTERECTOMY  1977    SPINE SURGERY  1987       Review of patient's allergies indicates:   Allergen Reactions    Penicillins Itching and Rash    Promethazine Itching and Rash    Sulfa (sulfonamide antibiotics) Itching and Rash           Current Outpatient Medications:     atorvastatin (LIPITOR) 20 MG tablet, Take 1 tablet (20 mg total) by mouth Daily., Disp: 90 tablet, Rfl: 1    diltiaZEM (CARDIZEM) 90 MG tablet, Take 1 tablet (90 mg total) by mouth 2 (two) times daily., Disp: 180 tablet, Rfl: 1    escitalopram oxalate (LEXAPRO) 5 MG Tab, Take 1 tablet (5 mg total) by mouth once daily., Disp: 90 tablet, Rfl: 1    furosemide (LASIX) 40 MG tablet, Take 1 tablet (40 mg total) by mouth 3 (three) times a week., Disp: 90 tablet, Rfl: 1    gabapentin (NEURONTIN) 600 MG tablet, Take 600 mg by mouth 3 (three) times daily., Disp: , Rfl:     pyridoxine, vitamin B6, (VITAMIN B-6) 100 MG Tab, Take 50 mg by mouth once daily., Disp: , Rfl:           Objective:   Vitals:  Blood pressure 123/73, pulse 68, temperature 98.8 °F (37.1 °C), weight 101.6 kg (224 lb), SpO2 95 %.    Physical Examination:   GEN: no apparent distress, comfortable  HEAD: atraumatic and normocephalic  EYES: no pallor, no icterus  ENT:  no pharyngeal erythema, external ears WNL; no nasal discharge  NECK: no masses, thyroid normal, trachea midline, no LAD/LN's, supple  CV: RRR with no murmur; normal pulse; normal S1 and S2; no pedal edema  CHEST: Normal respiratory effort; CTAB; normal breath sounds; no wheeze or crackles  ABDOM: nontender and nondistended; soft;  no rebound/guarding  MUSC/Skeletal: ROM normal; no crepitus; joints normal; no deformities   EXTREM: no clubbing, cyanosis, inflammation or swelling  SKIN: no rashes,  lesions, ulcers, petechiae   : no cvat  NEURO: grossly intact; motor/sensory WNL;  no tremors  PSYCH: normal mood, affect and behavior  LYMPH: normal cervical, supraclavicular, axillary and groin LN's  BREASTS: L & R no palpable mass    WBC 13,600., IgA 550,  B12, Folate, ferritin NL.  B6 2.7., Epo 19.    Assessment:   (1) 74 y.o. female with diagnosis of WBC 14,100. Gradually increasing from 8,900 since 2/2017.  Slight increased monocytes.  On repeat determination the WBC 13,600.  B6 is low at 2.7.  Began B6 50 mg daily and re check CBC in 3-4 months.  Defer bone marrow exam for now.    (2)Consider leukemoid reaction, myeloproliferative syndrome including CML and CMML.    (3) 2 first degree relatives with Breast Cancer.  Discussed BRCA 1&2 testing.  Her insurance carrier would not authorize BRCA 1 or 2.    (4)TSH increased 5.43, Dr. Valadez to review thyroid status.    (5)neuropathy sx at R anterior thigh.  Dr. Valadez aware, and to evaluate later, when pt agrees.    (6)R renal mass, may be malignant, referred  to Dr. Mondragon for surgery evaluation.  Due for renal surgery 9/18/19.    RTC 3 weeks.         I have explained and the patient understands all of  the current recommendation(s). I have answered all of their questions to the best of my ability and to their complete satisfaction.

## 2019-09-12 ENCOUNTER — HOSPITAL ENCOUNTER (OUTPATIENT)
Dept: PREADMISSION TESTING | Facility: HOSPITAL | Age: 74
Discharge: HOME | End: 2019-09-12
Attending: UROLOGY
Payer: MEDICARE

## 2019-09-12 DIAGNOSIS — N28.89 RIGHT RENAL MASS: ICD-10-CM

## 2019-09-12 DIAGNOSIS — Z01.818 PREOP TESTING: Primary | ICD-10-CM

## 2019-09-12 LAB
ABO + RH BLD: NORMAL
BLD GP AB SCN CELLS X3 SERPL QL: NORMAL

## 2019-09-12 PROCEDURE — 99900103 DSU ONLY-NO CHARGE-INITIAL HR (STAT)

## 2019-09-12 PROCEDURE — 36415 COLL VENOUS BLD VENIPUNCTURE: CPT

## 2019-09-12 PROCEDURE — 86850 RBC ANTIBODY SCREEN: CPT

## 2019-09-17 ENCOUNTER — ANESTHESIA EVENT (OUTPATIENT)
Dept: SURGERY | Facility: HOSPITAL | Age: 74
DRG: 657 | End: 2019-09-17
Payer: MEDICARE

## 2019-09-17 PROCEDURE — 86920 COMPATIBILITY TEST SPIN: CPT

## 2019-09-18 ENCOUNTER — ANESTHESIA (OUTPATIENT)
Dept: SURGERY | Facility: HOSPITAL | Age: 74
DRG: 657 | End: 2019-09-18
Payer: MEDICARE

## 2019-09-18 ENCOUNTER — HOSPITAL ENCOUNTER (INPATIENT)
Facility: HOSPITAL | Age: 74
LOS: 1 days | Discharge: HOME | DRG: 657 | End: 2019-09-19
Attending: UROLOGY | Admitting: UROLOGY
Payer: MEDICARE

## 2019-09-18 DIAGNOSIS — N28.89 RIGHT RENAL MASS: ICD-10-CM

## 2019-09-18 LAB
ANION GAP SERPL CALC-SCNC: 13 MMOL/L (ref 8–16)
BASOPHILS # BLD AUTO: 0.08 K/UL (ref 0–0.2)
BASOPHILS NFR BLD: 0.3 % (ref 0–1.9)
BUN SERPL-MCNC: 14 MG/DL (ref 8–23)
CALCIUM SERPL-MCNC: 8.6 MG/DL (ref 8.7–10.5)
CHLORIDE SERPL-SCNC: 105 MMOL/L (ref 95–110)
CO2 SERPL-SCNC: 21 MMOL/L (ref 23–29)
CREAT SERPL-MCNC: 0.9 MG/DL (ref 0.5–1.4)
DIFFERENTIAL METHOD: ABNORMAL
EOSINOPHIL # BLD AUTO: 0 K/UL (ref 0–0.5)
EOSINOPHIL NFR BLD: 0.1 % (ref 0–8)
ERYTHROCYTE [DISTWIDTH] IN BLOOD BY AUTOMATED COUNT: 15.9 % (ref 11.5–14.5)
EST. GFR  (AFRICAN AMERICAN): >60 ML/MIN/1.73 M^2
EST. GFR  (NON AFRICAN AMERICAN): >60 ML/MIN/1.73 M^2
GLUCOSE SERPL-MCNC: 138 MG/DL (ref 70–110)
HCT VFR BLD AUTO: 42.3 % (ref 37–48.5)
HGB BLD-MCNC: 13.8 G/DL (ref 12–16)
IMM GRANULOCYTES # BLD AUTO: 0.17 K/UL (ref 0–0.04)
LYMPHOCYTES # BLD AUTO: 1.3 K/UL (ref 1–4.8)
LYMPHOCYTES NFR BLD: 4.9 % (ref 18–48)
MCH RBC QN AUTO: 28.8 PG (ref 27–31)
MCHC RBC AUTO-ENTMCNC: 32.6 G/DL (ref 32–36)
MCV RBC AUTO: 88 FL (ref 82–98)
MONOCYTES # BLD AUTO: 1 K/UL (ref 0.3–1)
MONOCYTES NFR BLD: 3.8 % (ref 4–15)
NEUTROPHILS # BLD AUTO: 23.8 K/UL (ref 1.8–7.7)
NEUTROPHILS NFR BLD: 90.3 % (ref 38–73)
NRBC BLD-RTO: 0 /100 WBC
PLATELET # BLD AUTO: 348 K/UL (ref 150–350)
PMV BLD AUTO: 9.6 FL (ref 9.2–12.9)
POTASSIUM SERPL-SCNC: 4.5 MMOL/L (ref 3.5–5.1)
RBC # BLD AUTO: 4.79 M/UL (ref 4–5.4)
SODIUM SERPL-SCNC: 139 MMOL/L (ref 136–145)
WBC # BLD AUTO: 26.4 K/UL (ref 3.9–12.7)

## 2019-09-18 PROCEDURE — 25000003 PHARM REV CODE 250: Performed by: UROLOGY

## 2019-09-18 PROCEDURE — 25000242 PHARM REV CODE 250 ALT 637 W/ HCPCS: Performed by: ANESTHESIOLOGY

## 2019-09-18 PROCEDURE — 37000008 HC ANESTHESIA 1ST 15 MINUTES: Performed by: UROLOGY

## 2019-09-18 PROCEDURE — D9220A PRA ANESTHESIA: ICD-10-PCS | Mod: CRNA,,, | Performed by: NURSE ANESTHETIST, CERTIFIED REGISTERED

## 2019-09-18 PROCEDURE — 88342 TISSUE SPECIMEN TO PATHOLOGY - SURGERY: ICD-10-PCS | Mod: 26,,, | Performed by: PATHOLOGY

## 2019-09-18 PROCEDURE — 71000039 HC RECOVERY, EACH ADD'L HOUR: Performed by: UROLOGY

## 2019-09-18 PROCEDURE — 36000713 HC OR TIME LEV V EA ADD 15 MIN: Performed by: UROLOGY

## 2019-09-18 PROCEDURE — 63600175 PHARM REV CODE 636 W HCPCS: Performed by: UROLOGY

## 2019-09-18 PROCEDURE — 36000712 HC OR TIME LEV V 1ST 15 MIN: Performed by: UROLOGY

## 2019-09-18 PROCEDURE — 12000002 HC ACUTE/MED SURGE SEMI-PRIVATE ROOM

## 2019-09-18 PROCEDURE — 25000003 PHARM REV CODE 250: Performed by: NURSE ANESTHETIST, CERTIFIED REGISTERED

## 2019-09-18 PROCEDURE — 80048 BASIC METABOLIC PNL TOTAL CA: CPT

## 2019-09-18 PROCEDURE — 36415 COLL VENOUS BLD VENIPUNCTURE: CPT

## 2019-09-18 PROCEDURE — 63600175 PHARM REV CODE 636 W HCPCS: Performed by: ANESTHESIOLOGY

## 2019-09-18 PROCEDURE — 99900035 HC TECH TIME PER 15 MIN (STAT)

## 2019-09-18 PROCEDURE — S5010 5% DEXTROSE AND 0.45% SALINE: HCPCS | Performed by: UROLOGY

## 2019-09-18 PROCEDURE — 50545 PR LAP, RADICAL NEPHRECTOMY: ICD-10-PCS | Mod: 22,RT,, | Performed by: UROLOGY

## 2019-09-18 PROCEDURE — 88307 TISSUE EXAM BY PATHOLOGIST: CPT | Mod: 26,,, | Performed by: PATHOLOGY

## 2019-09-18 PROCEDURE — 50545 LAPARO RADICAL NEPHRECTOMY: CPT | Mod: 22,RT,, | Performed by: UROLOGY

## 2019-09-18 PROCEDURE — D9220A PRA ANESTHESIA: ICD-10-PCS | Mod: ANES,,, | Performed by: ANESTHESIOLOGY

## 2019-09-18 PROCEDURE — D9220A PRA ANESTHESIA: Mod: CRNA,,, | Performed by: NURSE ANESTHETIST, CERTIFIED REGISTERED

## 2019-09-18 PROCEDURE — 88342 IMHCHEM/IMCYTCHM 1ST ANTB: CPT | Performed by: PATHOLOGY

## 2019-09-18 PROCEDURE — 85025 COMPLETE CBC W/AUTO DIFF WBC: CPT

## 2019-09-18 PROCEDURE — 94761 N-INVAS EAR/PLS OXIMETRY MLT: CPT

## 2019-09-18 PROCEDURE — 37000009 HC ANESTHESIA EA ADD 15 MINS: Performed by: UROLOGY

## 2019-09-18 PROCEDURE — 27201423 OPTIME MED/SURG SUP & DEVICES STERILE SUPPLY: Performed by: UROLOGY

## 2019-09-18 PROCEDURE — 99900104 DSU ONLY-NO CHARGE-EA ADD'L HR (STAT): Performed by: UROLOGY

## 2019-09-18 PROCEDURE — D9220A PRA ANESTHESIA: Mod: ANES,,, | Performed by: ANESTHESIOLOGY

## 2019-09-18 PROCEDURE — 63600175 PHARM REV CODE 636 W HCPCS: Performed by: NURSE ANESTHETIST, CERTIFIED REGISTERED

## 2019-09-18 PROCEDURE — 71000033 HC RECOVERY, INTIAL HOUR: Performed by: UROLOGY

## 2019-09-18 PROCEDURE — 99900103 DSU ONLY-NO CHARGE-INITIAL HR (STAT): Performed by: UROLOGY

## 2019-09-18 PROCEDURE — 88307 TISSUE SPECIMEN TO PATHOLOGY - SURGERY: ICD-10-PCS | Mod: 26,,, | Performed by: PATHOLOGY

## 2019-09-18 PROCEDURE — 27000221 HC OXYGEN, UP TO 24 HOURS

## 2019-09-18 RX ORDER — LEVALBUTEROL 1.25 MG/.5ML
1.25 SOLUTION, CONCENTRATE RESPIRATORY (INHALATION) ONCE
Status: COMPLETED | OUTPATIENT
Start: 2019-09-18 | End: 2019-09-18

## 2019-09-18 RX ORDER — MIDAZOLAM HYDROCHLORIDE 1 MG/ML
INJECTION, SOLUTION INTRAMUSCULAR; INTRAVENOUS
Status: DISCONTINUED | OUTPATIENT
Start: 2019-09-18 | End: 2019-09-18

## 2019-09-18 RX ORDER — POLYETHYLENE GLYCOL 3350 17 G/17G
17 POWDER, FOR SOLUTION ORAL DAILY
Status: DISCONTINUED | OUTPATIENT
Start: 2019-09-19 | End: 2019-09-19 | Stop reason: HOSPADM

## 2019-09-18 RX ORDER — ONDANSETRON 2 MG/ML
4 INJECTION INTRAMUSCULAR; INTRAVENOUS EVERY 4 HOURS PRN
Status: DISCONTINUED | OUTPATIENT
Start: 2019-09-18 | End: 2019-09-19 | Stop reason: HOSPADM

## 2019-09-18 RX ORDER — CIPROFLOXACIN 2 MG/ML
400 INJECTION, SOLUTION INTRAVENOUS
Status: COMPLETED | OUTPATIENT
Start: 2019-09-18 | End: 2019-09-19

## 2019-09-18 RX ORDER — LIDOCAINE HYDROCHLORIDE 10 MG/ML
1 INJECTION, SOLUTION EPIDURAL; INFILTRATION; INTRACAUDAL; PERINEURAL ONCE
Status: DISCONTINUED | OUTPATIENT
Start: 2019-09-18 | End: 2019-09-18

## 2019-09-18 RX ORDER — LANOLIN ALCOHOL/MO/W.PET/CERES
50 CREAM (GRAM) TOPICAL DAILY
Status: DISCONTINUED | OUTPATIENT
Start: 2019-09-19 | End: 2019-09-19 | Stop reason: HOSPADM

## 2019-09-18 RX ORDER — ACETAMINOPHEN 10 MG/ML
1000 INJECTION, SOLUTION INTRAVENOUS EVERY 8 HOURS
Status: COMPLETED | OUTPATIENT
Start: 2019-09-18 | End: 2019-09-18

## 2019-09-18 RX ORDER — HYDRALAZINE HYDROCHLORIDE 20 MG/ML
10 INJECTION INTRAMUSCULAR; INTRAVENOUS EVERY 6 HOURS PRN
Status: DISCONTINUED | OUTPATIENT
Start: 2019-09-18 | End: 2019-09-19 | Stop reason: HOSPADM

## 2019-09-18 RX ORDER — CIPROFLOXACIN 2 MG/ML
400 INJECTION, SOLUTION INTRAVENOUS
Status: DISCONTINUED | OUTPATIENT
Start: 2019-09-18 | End: 2019-09-18

## 2019-09-18 RX ORDER — ACETAMINOPHEN 325 MG/1
650 TABLET ORAL EVERY 4 HOURS PRN
Status: DISCONTINUED | OUTPATIENT
Start: 2019-09-18 | End: 2019-09-19 | Stop reason: HOSPADM

## 2019-09-18 RX ORDER — HEPARIN SODIUM 5000 [USP'U]/ML
5000 INJECTION, SOLUTION INTRAVENOUS; SUBCUTANEOUS 2 TIMES DAILY
Status: DISCONTINUED | OUTPATIENT
Start: 2019-09-18 | End: 2019-09-19 | Stop reason: HOSPADM

## 2019-09-18 RX ORDER — SODIUM CHLORIDE, SODIUM LACTATE, POTASSIUM CHLORIDE, CALCIUM CHLORIDE 600; 310; 30; 20 MG/100ML; MG/100ML; MG/100ML; MG/100ML
INJECTION, SOLUTION INTRAVENOUS CONTINUOUS
Status: DISCONTINUED | OUTPATIENT
Start: 2019-09-18 | End: 2019-09-18

## 2019-09-18 RX ORDER — ATORVASTATIN CALCIUM 20 MG/1
20 TABLET, FILM COATED ORAL DAILY
Status: DISCONTINUED | OUTPATIENT
Start: 2019-09-19 | End: 2019-09-19 | Stop reason: HOSPADM

## 2019-09-18 RX ORDER — SODIUM CHLORIDE 0.9 % (FLUSH) 0.9 %
10 SYRINGE (ML) INJECTION
Status: DISCONTINUED | OUTPATIENT
Start: 2019-09-18 | End: 2019-09-18 | Stop reason: HOSPADM

## 2019-09-18 RX ORDER — LIDOCAINE HYDROCHLORIDE 10 MG/ML
INJECTION INFILTRATION; PERINEURAL
Status: DISCONTINUED | OUTPATIENT
Start: 2019-09-18 | End: 2019-09-18 | Stop reason: HOSPADM

## 2019-09-18 RX ORDER — ROCURONIUM BROMIDE 10 MG/ML
INJECTION, SOLUTION INTRAVENOUS
Status: DISCONTINUED | OUTPATIENT
Start: 2019-09-18 | End: 2019-09-18

## 2019-09-18 RX ORDER — MORPHINE SULFATE 2 MG/ML
1 INJECTION, SOLUTION INTRAMUSCULAR; INTRAVENOUS EVERY 4 HOURS PRN
Status: DISCONTINUED | OUTPATIENT
Start: 2019-09-18 | End: 2019-09-19

## 2019-09-18 RX ORDER — LIDOCAINE HCL/PF 100 MG/5ML
SYRINGE (ML) INTRAVENOUS
Status: DISCONTINUED | OUTPATIENT
Start: 2019-09-18 | End: 2019-09-18

## 2019-09-18 RX ORDER — IPRATROPIUM BROMIDE AND ALBUTEROL SULFATE 2.5; .5 MG/3ML; MG/3ML
3 SOLUTION RESPIRATORY (INHALATION) EVERY 6 HOURS PRN
Status: DISCONTINUED | OUTPATIENT
Start: 2019-09-18 | End: 2019-09-19 | Stop reason: HOSPADM

## 2019-09-18 RX ORDER — HYDROCODONE BITARTRATE AND ACETAMINOPHEN 500; 5 MG/1; MG/1
TABLET ORAL
Status: DISCONTINUED | OUTPATIENT
Start: 2019-09-18 | End: 2019-09-18

## 2019-09-18 RX ORDER — KETOROLAC TROMETHAMINE 30 MG/ML
15 INJECTION, SOLUTION INTRAMUSCULAR; INTRAVENOUS ONCE
Status: COMPLETED | OUTPATIENT
Start: 2019-09-18 | End: 2019-09-18

## 2019-09-18 RX ORDER — NEOSTIGMINE METHYLSULFATE 1 MG/ML
INJECTION, SOLUTION INTRAVENOUS
Status: DISCONTINUED | OUTPATIENT
Start: 2019-09-18 | End: 2019-09-18

## 2019-09-18 RX ORDER — SIMETHICONE 80 MG
2 TABLET,CHEWABLE ORAL 3 TIMES DAILY
Status: DISCONTINUED | OUTPATIENT
Start: 2019-09-18 | End: 2019-09-19 | Stop reason: HOSPADM

## 2019-09-18 RX ORDER — FAMOTIDINE 20 MG/1
20 TABLET, FILM COATED ORAL 2 TIMES DAILY
Status: DISCONTINUED | OUTPATIENT
Start: 2019-09-18 | End: 2019-09-19 | Stop reason: HOSPADM

## 2019-09-18 RX ORDER — KETAMINE HYDROCHLORIDE 10 MG/ML
INJECTION, SOLUTION INTRAMUSCULAR; INTRAVENOUS
Status: DISCONTINUED | OUTPATIENT
Start: 2019-09-18 | End: 2019-09-18

## 2019-09-18 RX ORDER — ONDANSETRON HYDROCHLORIDE 2 MG/ML
INJECTION, SOLUTION INTRAMUSCULAR; INTRAVENOUS
Status: DISCONTINUED | OUTPATIENT
Start: 2019-09-18 | End: 2019-09-18

## 2019-09-18 RX ORDER — ATRACURIUM BESYLATE 10 MG/ML
INJECTION, SOLUTION INTRAVENOUS
Status: DISCONTINUED | OUTPATIENT
Start: 2019-09-18 | End: 2019-09-18

## 2019-09-18 RX ORDER — SUCCINYLCHOLINE CHLORIDE 20 MG/ML
INJECTION INTRAMUSCULAR; INTRAVENOUS
Status: DISCONTINUED | OUTPATIENT
Start: 2019-09-18 | End: 2019-09-18

## 2019-09-18 RX ORDER — FENTANYL CITRATE 50 UG/ML
INJECTION, SOLUTION INTRAMUSCULAR; INTRAVENOUS
Status: DISCONTINUED | OUTPATIENT
Start: 2019-09-18 | End: 2019-09-18

## 2019-09-18 RX ORDER — PROPOFOL 10 MG/ML
VIAL (ML) INTRAVENOUS
Status: DISCONTINUED | OUTPATIENT
Start: 2019-09-18 | End: 2019-09-18

## 2019-09-18 RX ORDER — DEXAMETHASONE SODIUM PHOSPHATE 4 MG/ML
INJECTION, SOLUTION INTRA-ARTICULAR; INTRALESIONAL; INTRAMUSCULAR; INTRAVENOUS; SOFT TISSUE
Status: DISCONTINUED | OUTPATIENT
Start: 2019-09-18 | End: 2019-09-18

## 2019-09-18 RX ORDER — BUPIVACAINE HYDROCHLORIDE 2.5 MG/ML
INJECTION, SOLUTION EPIDURAL; INFILTRATION; INTRACAUDAL
Status: DISCONTINUED | OUTPATIENT
Start: 2019-09-18 | End: 2019-09-18 | Stop reason: HOSPADM

## 2019-09-18 RX ORDER — ESCITALOPRAM OXALATE 5 MG/1
5 TABLET ORAL DAILY
Status: DISCONTINUED | OUTPATIENT
Start: 2019-09-19 | End: 2019-09-19 | Stop reason: HOSPADM

## 2019-09-18 RX ORDER — GLYCOPYRROLATE 0.2 MG/ML
INJECTION INTRAMUSCULAR; INTRAVENOUS
Status: DISCONTINUED | OUTPATIENT
Start: 2019-09-18 | End: 2019-09-18

## 2019-09-18 RX ORDER — HEPARIN SODIUM 10000 [USP'U]/ML
INJECTION, SOLUTION INTRAVENOUS; SUBCUTANEOUS
Status: DISCONTINUED | OUTPATIENT
Start: 2019-09-18 | End: 2019-09-18 | Stop reason: HOSPADM

## 2019-09-18 RX ORDER — KETAMINE HYDROCHLORIDE 100 MG/ML
INJECTION, SOLUTION INTRAMUSCULAR; INTRAVENOUS
Status: DISCONTINUED | OUTPATIENT
Start: 2019-09-18 | End: 2019-09-18

## 2019-09-18 RX ORDER — ACETAMINOPHEN 10 MG/ML
1000 INJECTION, SOLUTION INTRAVENOUS EVERY 8 HOURS
Status: DISCONTINUED | OUTPATIENT
Start: 2019-09-18 | End: 2019-09-18 | Stop reason: SDUPTHER

## 2019-09-18 RX ORDER — DEXTROSE MONOHYDRATE AND SODIUM CHLORIDE 5; .45 G/100ML; G/100ML
INJECTION, SOLUTION INTRAVENOUS CONTINUOUS
Status: DISCONTINUED | OUTPATIENT
Start: 2019-09-18 | End: 2019-09-19 | Stop reason: HOSPADM

## 2019-09-18 RX ORDER — ACETAMINOPHEN 10 MG/ML
INJECTION, SOLUTION INTRAVENOUS
Status: DISCONTINUED | OUTPATIENT
Start: 2019-09-18 | End: 2019-09-18

## 2019-09-18 RX ORDER — DOCUSATE SODIUM 100 MG/1
100 CAPSULE, LIQUID FILLED ORAL 2 TIMES DAILY
Status: DISCONTINUED | OUTPATIENT
Start: 2019-09-18 | End: 2019-09-19 | Stop reason: HOSPADM

## 2019-09-18 RX ORDER — CIPROFLOXACIN 2 MG/ML
400 INJECTION, SOLUTION INTRAVENOUS
Status: COMPLETED | OUTPATIENT
Start: 2019-09-18 | End: 2019-09-18

## 2019-09-18 RX ORDER — TRAMADOL HYDROCHLORIDE 50 MG/1
50 TABLET ORAL EVERY 6 HOURS PRN
Status: DISCONTINUED | OUTPATIENT
Start: 2019-09-18 | End: 2019-09-19 | Stop reason: HOSPADM

## 2019-09-18 RX ADMIN — FENTANYL CITRATE 75 MCG: 50 INJECTION, SOLUTION INTRAMUSCULAR; INTRAVENOUS at 07:09

## 2019-09-18 RX ADMIN — CIPROFLOXACIN 400 MG: 2 INJECTION, SOLUTION INTRAVENOUS at 09:09

## 2019-09-18 RX ADMIN — GLYCOPYRROLATE 0.2 MG: 0.2 INJECTION, SOLUTION INTRAMUSCULAR; INTRAVENOUS at 08:09

## 2019-09-18 RX ADMIN — PROPOFOL 50 MG: 10 INJECTION, EMULSION INTRAVENOUS at 08:09

## 2019-09-18 RX ADMIN — SIMETHICONE CHEW TAB 80 MG 160 MG: 80 TABLET ORAL at 09:09

## 2019-09-18 RX ADMIN — ATRACURIUM BESYLATE 10 MG: 10 INJECTION, SOLUTION INTRAVENOUS at 10:09

## 2019-09-18 RX ADMIN — PROPOFOL 110 MG: 10 INJECTION, EMULSION INTRAVENOUS at 07:09

## 2019-09-18 RX ADMIN — FENTANYL CITRATE 25 MCG: 50 INJECTION, SOLUTION INTRAMUSCULAR; INTRAVENOUS at 12:09

## 2019-09-18 RX ADMIN — SODIUM CHLORIDE, SODIUM LACTATE, POTASSIUM CHLORIDE, AND CALCIUM CHLORIDE: .6; .31; .03; .02 INJECTION, SOLUTION INTRAVENOUS at 08:09

## 2019-09-18 RX ADMIN — KETAMINE HYDROCHLORIDE 50 MG: 10 INJECTION, SOLUTION INTRAMUSCULAR; INTRAVENOUS at 08:09

## 2019-09-18 RX ADMIN — DEXTROSE AND SODIUM CHLORIDE: 5; .45 INJECTION, SOLUTION INTRAVENOUS at 03:09

## 2019-09-18 RX ADMIN — ATRACURIUM BESYLATE 10 MG: 10 INJECTION, SOLUTION INTRAVENOUS at 09:09

## 2019-09-18 RX ADMIN — GLYCOPYRROLATE 0.6 MG: 0.2 INJECTION, SOLUTION INTRAMUSCULAR; INTRAVENOUS at 01:09

## 2019-09-18 RX ADMIN — TRAMADOL HYDROCHLORIDE 50 MG: 50 TABLET, FILM COATED ORAL at 02:09

## 2019-09-18 RX ADMIN — CIPROFLOXACIN 400 MG: 2 INJECTION INTRAVENOUS at 07:09

## 2019-09-18 RX ADMIN — FENTANYL CITRATE 25 MCG: 50 INJECTION, SOLUTION INTRAMUSCULAR; INTRAVENOUS at 11:09

## 2019-09-18 RX ADMIN — FENTANYL CITRATE 25 MCG: 50 INJECTION, SOLUTION INTRAMUSCULAR; INTRAVENOUS at 08:09

## 2019-09-18 RX ADMIN — LEVALBUTEROL HYDROCHLORIDE 1.25 MG: 1.25 SOLUTION, CONCENTRATE RESPIRATORY (INHALATION) at 02:09

## 2019-09-18 RX ADMIN — NEOSTIGMINE METHYLSULFATE 3 MG: 1 INJECTION INTRAVENOUS at 01:09

## 2019-09-18 RX ADMIN — SUCCINYLCHOLINE CHLORIDE 120 MG: 20 INJECTION, SOLUTION INTRAMUSCULAR; INTRAVENOUS at 07:09

## 2019-09-18 RX ADMIN — SIMETHICONE CHEW TAB 80 MG 160 MG: 80 TABLET ORAL at 02:09

## 2019-09-18 RX ADMIN — ROCURONIUM BROMIDE 5 MG: 10 INJECTION, SOLUTION INTRAVENOUS at 07:09

## 2019-09-18 RX ADMIN — KETOROLAC TROMETHAMINE 15 MG: 30 INJECTION, SOLUTION INTRAMUSCULAR at 04:09

## 2019-09-18 RX ADMIN — DOCUSATE SODIUM 100 MG: 100 CAPSULE, LIQUID FILLED ORAL at 09:09

## 2019-09-18 RX ADMIN — DEXTROSE AND SODIUM CHLORIDE: 5; .45 INJECTION, SOLUTION INTRAVENOUS at 10:09

## 2019-09-18 RX ADMIN — ACETAMINOPHEN 1000 MG: 10 INJECTION, SOLUTION INTRAVENOUS at 09:09

## 2019-09-18 RX ADMIN — MIDAZOLAM 1 MG: 1 INJECTION INTRAMUSCULAR; INTRAVENOUS at 07:09

## 2019-09-18 RX ADMIN — PROPOFOL 20 MG: 10 INJECTION, EMULSION INTRAVENOUS at 12:09

## 2019-09-18 RX ADMIN — ONDANSETRON 4 MG: 2 INJECTION INTRAMUSCULAR; INTRAVENOUS at 04:09

## 2019-09-18 RX ADMIN — HEPARIN SODIUM 5000 UNITS: 5000 INJECTION, SOLUTION INTRAVENOUS; SUBCUTANEOUS at 09:09

## 2019-09-18 RX ADMIN — TRAMADOL HYDROCHLORIDE 50 MG: 50 TABLET, FILM COATED ORAL at 09:09

## 2019-09-18 RX ADMIN — DILTIAZEM HYDROCHLORIDE 90 MG: 60 TABLET, FILM COATED ORAL at 09:09

## 2019-09-18 RX ADMIN — FAMOTIDINE 20 MG: 20 TABLET, FILM COATED ORAL at 09:09

## 2019-09-18 RX ADMIN — DEXAMETHASONE SODIUM PHOSPHATE 8 MG: 4 INJECTION, SOLUTION INTRAMUSCULAR; INTRAVENOUS at 08:09

## 2019-09-18 RX ADMIN — SODIUM CHLORIDE, SODIUM LACTATE, POTASSIUM CHLORIDE, AND CALCIUM CHLORIDE: .6; .31; .03; .02 INJECTION, SOLUTION INTRAVENOUS at 12:09

## 2019-09-18 RX ADMIN — ACETAMINOPHEN 1000 MG: 10 INJECTION, SOLUTION INTRAVENOUS at 08:09

## 2019-09-18 RX ADMIN — SODIUM CHLORIDE, SODIUM LACTATE, POTASSIUM CHLORIDE, AND CALCIUM CHLORIDE: .6; .31; .03; .02 INJECTION, SOLUTION INTRAVENOUS at 06:09

## 2019-09-18 RX ADMIN — FENTANYL CITRATE 50 MCG: 50 INJECTION, SOLUTION INTRAMUSCULAR; INTRAVENOUS at 10:09

## 2019-09-18 RX ADMIN — ATRACURIUM BESYLATE 10 MG: 10 INJECTION, SOLUTION INTRAVENOUS at 08:09

## 2019-09-18 RX ADMIN — ONDANSETRON 4 MG: 2 INJECTION, SOLUTION INTRAMUSCULAR; INTRAVENOUS at 11:09

## 2019-09-18 RX ADMIN — LIDOCAINE HYDROCHLORIDE 80 MG: 20 INJECTION, SOLUTION INTRAVENOUS at 07:09

## 2019-09-18 RX ADMIN — ROCURONIUM BROMIDE 45 MG: 10 INJECTION, SOLUTION INTRAVENOUS at 07:09

## 2019-09-18 RX ADMIN — ACETAMINOPHEN 1000 MG: 10 INJECTION, SOLUTION INTRAVENOUS at 02:09

## 2019-09-18 NOTE — H&P
[facility] Urology New Patient/H&P:     Chen Levine is a 74 y.o. female who presents for evaluation of right renal mass at referral of Dr Nunes.     She has been worked up by hematology for leukocytosis and found to have WBC 14k. Has spastic colitis hx with cramps. HTN, asthma as a child.  Bladder cancer 1984,Dr. Stahl, treated with surgery. She has stress incontinence. DVT hx in her 20's. Partial hysterectomy  S/P neck fusion.  LBP with injections, residual numbness down R anterior Leg.  Smoked 1/2 ppd x's 57 years. Allergy Phenergan, PCN, sulfa. Job [employer] worker. [employer] worker.  Mom breast Ca, macular degeneration. Dad CVA. Sister x's 2, LBP. 1 sister renal cancer.  Daughter Breast cancer, epilepsy, heart dx. Daughter hypothyroid dx., cholesterol, 40# benign ovarian tumor     CT and U/S confirm 5.3 x 4.8 cm mass R kidney. Referred for evaluation. Her sister had nephrectomy for renal cancer.  Slight increased monocytes.  On repeat determination the WBC 12,600. B6 is low at 2.7.  Begin B6 50 mg daily and re check CBC in 3-4 months. Defer bone marrow exam for now.     Abdominal US 7/28/19:  Gallbladder is well-distended demonstrating evidence of a small echogenic stone identified in the region of the gallbladder neck. There is minimal shadowing emanating from the posterior elements. No significant wall thickening or surrounding pericholecystic fluid. Caliber of the common duct appears normal. There appears within normal limits. The right kidney shows evidence of longest dimension of 11.4 cm with fairly well-defined hyperechoic zone arising from the inner hilar region that is concerning for a very mass. This measures on the order of 4.1 cm in its towards the hilar contour. Further diagnostic evaluation with cross-sectional imaging utilizing CT format is highly advised. The left kidney measures 11.2 cm in length. The spleen appears under size measuring 8.7 cm in long axis with evidence  of normal outline and echogenicity.     CT 7/30/19:  There is indeed a isodense mass in the inferior right kidney. Postcontrast multiphasic scan was performed using 100 cc of intravenous Omnipaque 350 and it shows marked enhancement of the mass with central necrosis. The mass measures 48 x 53 mm in diameter along its transverse and anteroposterior dimensions respectively. There is no extension into the perinephric space. No extension or tumor thrombus noted into the renal vein on the inferior vena cava. There are also no enlarged para-aortic retroperitoneal lymph nodes.  In the rest of the kidneys, no additional renal masses are renal abnormality is seen. No abnormal enhancement noted in the rest of the abdominal viscera with no evidence to suggest liver metastasis. There is normal opacification of the portal vein. The ureters and the urinary bladder appear normal. Patient has had a hysterectomy in the past. There is no bowel wall thickening or dilatation. There are no other intra-abdominal masses, abnormal fluid collections, inflammatory changes or ventral hernia. There are no osteoblastic or osteoclastic skeletal changes. Sclerosis along the left anterior iliac crest represents a bone graft donor site for cervical fusion in the past.  - There is indeed a solid enhancing mass with central necrosis, in the lower right kidney., The statistical probability of this representing a primary malignancy is at least 80%. There is no identifiable tumor thrombus in the renal vein or inferior vena cava. No evidence of distant metastasis in the visualized portions of the abdomen, pelvis and lower lungs     On review, was noted as incidental finding on L-spine MRI in 9/2018:  There is a 4 cm heterogeneous increased T2 signal low T1 signal mass in the mid right kidney which may represent complex cyst. Correlation with ultrasound is recommended  Renal US was ordered to evaluate, and CT was ordered to eval liver/spleen given  "hematologic workup - both imaging studies revealed renal mass as above  1/2ppd smoker since age 17, and now 3/4 ppd smoker since USP in 2008  Retired from supervisor sat [employer]  On B6 tablets first before further heme workup  Past cardiology eval: Dr Fuentes - about 1.5 years ago - worried about circulation for LE circulation from ulcers/infection. Reports told her heart was good.  Takes lasix for lower extremity edema.  Is not taking oxybutynin - hasnt since initial rx in 2017 as wasn't helping  Denies urgency/frequency during the day. At night when sleeping doesn't get urge to urinate, and when sits up to go may leak. Not every night. No ELIANA during the day.  Denies all LUTS or incontinence during the day. Only during sleep, may leak with sitting up - this will not happen during day  No blood in urine since diagnosis of bladder cancer in the 1980s.  This was resected and she had multiple negative cystoscopes for years until she was told further surveillance was not needed.  Has colitis, had ulcers removed endoscopically with Dr Barnes. "attacks" less frequent now 2x/yr vs monthly.                Past Medical History:   Diagnosis Date    Anxiety      Cancer      Depression      Hyperlipidemia      Hypertension                 Past Surgical History:   Procedure Laterality Date    BLADDER SURGERY   1985    c-spine fusion        HYSTERECTOMY   1977    SPINE SURGERY   1987               Family History   Problem Relation Age of Onset    Cancer Mother      Vision loss Mother      Hypertension Father      Stroke Father           Social History               Socioeconomic History    Marital status:        Spouse name: Not on file    Number of children: Not on file    Years of education: Not on file    Highest education level: Not on file   Occupational History    Not on file   Social Needs    Financial resource strain: Not on file    Food insecurity:       Worry: Not on file "       Inability: Not on file    Transportation needs:       Medical: Not on file       Non-medical: Not on file   Tobacco Use    Smoking status: Current Every Day Smoker    Smokeless tobacco: Never Used   Substance and Sexual Activity    Alcohol use: No    Drug use: No    Sexual activity: Not on file   Lifestyle    Physical activity:       Days per week: Not on file       Minutes per session: Not on file    Stress: Not on file   Relationships    Social connections:       Talks on phone: Not on file       Gets together: Not on file       Attends [employer] service: Not on file       Active member of club or organization: Not on file       Attends meetings of clubs or organizations: Not on file       Relationship status: Not on file   Other Topics Concern    Not on file   Social History Narrative    Not on file                 Review of patient's allergies indicates:   Allergen Reactions    Penicillins Itching and Rash    Promethazine Itching and Rash    Sulfa (sulfonamide antibiotics) Itching and Rash         Medications Reviewed: see MAR     ROS:     Constitutional: denies fevers, chills, night sweats, fatigue, malaise  Respiratory: negative for cough, shortness of breath, wheezing, dyspnea.  Cardiovascular: + for high blood pressure, negative for chest pain, varicose veins, ankle swelling, palpitations, syncope.  GI: negative for abdominal pain, heartburn, indigestion, nausea, vomiting, constipation, diarrhea, blood in stool.   Urology: as noted above in HPI  Endocrinology: negative for cold intolerance, excessive thirst, not feeling tired/sluggish, no heat intolerance.   Hematology/Lymph: negative for easy bleeding, easy bruising, swollen glands.  Musculoskeletal: negative for back pain, joint pain, joint swelling, neck pain.  Allergy-Immunology: negative for seasonal allergies, negative for unusual infections.   Skin: negative for boils, breast lumps, hives, itching, rash.   Neurology: negative for,  dizziness, headache, tingling/numbness, tremors.   Psych: + anxiety, negative for depression, suicidal thoughts.      PHYSICAL EXAM:         Vitals:     08/13/19 0935   Resp: 18      Body mass index is 43.98 kg/m². Weight: 102.2 kg (225 lb 3.2 oz) Height: 5' (152.4 cm)         General: Alert, cooperative, no distress, appears stated age  Head: Normocephalic, without obvious abnormality, atraumatic  Neck: no masses, no thyromegaly, no lymphadenopathy  Eyes: PERRL, conjunctiva/corneas clear  Lungs: Respirations unlabored, normal effort, no accessory muscle use  CV: Warm and well perfused extremities  Abdomen: Soft, non-tender, no CVA tenderness, no hepatosplenomegaly, no hernia, 1+ pannus  Extremities: Extremities normal, atraumatic, no cyanosis or edema  Skin: Normal color, texture, and turgor, no rashes or lesions  Psych: Appropriate, well oriented, normal affect, normal mood  Neuro: Non-focal        LABS:     Recent Results         Recent Results (from the past 336 hour(s))   POCT URINE DIPSTICK WITHOUT MICROSCOPE     Collection Time: 08/13/19  9:51 AM   Result Value Ref Range     Color, UA yellow       Spec Grav UA 1.010       pH, UA 6       WBC, UA neg       Nitrite, UA neg       Protein neg       Glucose, UA neg       Ketones, UA neg       Urobilinogen, UA 1.0       Bilirubin neg       Blood, UA neg                 Assessment/Diagnosis:  1. Right renal mass  POCT URINE DIPSTICK WITHOUT MICROSCOPE     Diet NPO     Case Request Operating Room: ROBOTIC NEPHRECTOMY     Admit to Inpatient     Insert peripheral IV     Reason for No Pharmacological VTE Prophylaxis     Place MARK hose     Place sequential compression device     Basic metabolic panel     CBC auto differential     Urinalysis     Type & Screen     Prepare RBC 2 Units; preop     EKG 12-lead     X-Ray Chest PA And Lateral       Urine culture       Protime-INR   2. History of bladder cancer  Cytology, urine            Plans:  I did review imaging with  the patient and delineated her large right cetral/hilar renal mass.  We did discuss that approximately 80% of enhancing renal masses represent a renal cell carcinoma, and surgical excision of these masses is the preferred treatment strategy, with nephron-sparing approach/partial nephrectomy preferred when possible. Her cT1b left renal mass does appear solid enhancing and consistent with an RCC. However mass is large and central and extends to the hilum and collecting system of the kidney, and crosses interpolar lines.  We did discuss anatomic challenges to partial nephrectomy, and given these characteristics, nephrectomy would be recommended.  We discussed robot-assisted laparoscopic approach in contrast to open approach, and after much discussion she elected to proceed with robotic/lap left nephrectomy.     She does have delayed phase imaging delineating the collecting system of which the mass shows extrinsic compression and seems to extend from the parenchyma most consistent with an RCC.  We did discuss the differences between renal cell carcinoma and urothelial carcinoma.  Though she has a history remotely of bladder cancer, she had it resected with negative surveillance and has no evidence of blood in her urine.  I did advise that I would send a urine cytology, and if there is any atypia, could discuss possible retrograde pyelogram and ureteroscopic evaluation, however with good CT urogram images RPG is unlikely to add any information. She does also have a primary relative with renal cell carcinoma. If cytology negative, would proceed with radical nephrectomy.       Risks and benefits of right radical nephrectomy discussed in detail, including but not limited to pain, infection, bleeding, scarring, injury to surrounding structures (liver, duodenum, colon, diaphragm, etc), hernia, postoperative ileus, declining renal function, need for conversion to open procedure. Also discussed adrenal sparing vs non, and  potential effect on blood pressure if adrenal removed. All questions patient had were answered in detail and appropriate informed consent was obtained.     Right robotic radical nephrectomy scheduled for 9/18/19.   CXR to be performed at preop visit to complete staging, and if any concerning lesions on CXR can follow with CT chest.  As well given her comorbidities, age, and obseity she will need medical and cardiac preop workup, optimization, and clearance. She does understand her increased risk of complications 2/2 to comorbidities. I also advised that she establish care with nephrology so can discuss optimization of renal function longterm given age and comorbidities which place her at risk of CKD despite good parenchyma and normal appearance of contralateral kidney, and referral placed.  Risk benefit discussion about proceeding with nuclear medicine Mag 3 renal scan to determine split differential function, however at risk purely given age and comorbidities, so would need nephrologic evaluation regardless. Mag3 deferred.    PAT 10-14d prior with T/C, Ucx  Preop Medical Clearance: Dr Madrid  Preop Cardiac Clearance: Dr Fuentes      Pt had extensive cardiac workup and clearance.  Has established care with nephrology     Seen in preop holding and site marked and all questions answered  Proceed as planned

## 2019-09-18 NOTE — ANESTHESIA PREPROCEDURE EVALUATION
09/18/2019  Chen Levine is a 74 y.o., female.    Anesthesia Evaluation    I have reviewed the Patient Summary Reports.    I have reviewed the Nursing Notes.      Review of Systems  Anesthesia Hx:  No problems with previous Anesthesia    Cardiovascular:   Hypertension    Pulmonary:   Asthma    Psych:   Psychiatric History          Physical Exam  General:  Well nourished, Morbid Obesity    Airway/Jaw/Neck:  Airway Findings: Mouth Opening: Normal Tongue: Normal  General Airway Assessment: Adult  Mallampati: IV  Improves to II with phonation.  TM Distance: Normal, at least 6 cm  Jaw/Neck Findings:  Neck ROM: Normal ROM     Eyes/Ears/Nose:  Eyes/Ears/Nose Findings:    Dental:  Dental Findings: Upper partial dentures   Chest/Lungs:  Chest/Lungs Findings: Normal Respiratory Rate     Heart/Vascular:  Heart Findings: Rate: Normal  Rhythm: Regular Rhythm        Mental Status:  Mental Status Findings:  Cooperative, Alert and Oriented         Anesthesia Plan  Type of Anesthesia, risks & benefits discussed:  Anesthesia Type:  general  Patient's Preference: General  Intra-op Monitoring Plan:   Intra-op Monitoring Plan Comments:   Post Op Pain Control Plan: multimodal analgesia and per primary service following discharge from PACU  Post Op Pain Control Plan Comments:   Induction:   IV  Beta Blocker:  Patient is not currently on a Beta-Blocker (No further documentation required).       Informed Consent: Patient understands risks and agrees with Anesthesia plan.  Questions answered. Anesthesia consent signed with patient.  ASA Score: 3     Day of Surgery Review of History & Physical:    H&P update referred to the surgeon.         Ready For Surgery From Anesthesia Perspective.

## 2019-09-18 NOTE — NURSING
"Pt remained free of falls, injuries, or trauma during shift. Fall precautions maintained throughout shift. Bed kept in lowest position, locked. Call light within reach. Bed alarm activated. Pt showed no new s/s of skin breakdown during shift. Incision are C/D/I. Dominguez draining clear yellow urine with good output. Pt lethargic in beginning of shift but more alert ane able to tolerate clear liquid diet at 1815 stating "I am starving." no nausea after dinner and zofran administration. Pt able to reposition herself. Pt states pain is "gone" after IV toradol administration. Pt rounded on hourly with a purposeful manner, educated patient and family about active orders. No acute events throughout the shift. VS and assessment performed per orders. Pt on supplemental o2. Patient progressing towards goals as tolerated. Plan of care reviewed with pt, all concerns addressed. Will continue to monitor.   "

## 2019-09-18 NOTE — ANESTHESIA POSTPROCEDURE EVALUATION
Anesthesia Post Evaluation    Patient: Chen Levine    Procedure(s) Performed: Procedure(s) (LRB):  ROBOTIC NEPHRECTOMY (Right)    Final Anesthesia Type: general  Patient location during evaluation: PACU  Patient participation: Yes- Able to Participate  Level of consciousness: awake and alert, oriented and awake  Post-procedure vital signs: reviewed and stable  Pain management: adequate  Airway patency: patent  PONV status at discharge: No PONV  Anesthetic complications: no      Cardiovascular status: blood pressure returned to baseline and hemodynamically stable  Respiratory status: unassisted, spontaneous ventilation and room air  Hydration status: euvolemic  Follow-up not needed.          Vitals Value Taken Time   /80 9/18/2019  3:34 PM   Temp 36.8 °C (98.2 °F) 9/18/2019  3:34 PM   Pulse 68 9/18/2019  3:34 PM   Resp 16 9/18/2019  3:34 PM   SpO2 96 % 9/18/2019  3:34 PM         Event Time     Out of Recovery 15:50:00          Pain/Analia Score: Pain Rating Prior to Med Admin: 10 (9/18/2019  2:57 PM)  Pain Rating Post Med Admin: 10 (9/18/2019  3:55 PM)  Analia Score: 8 (9/18/2019  3:20 PM)

## 2019-09-18 NOTE — TRANSFER OF CARE
Anesthesia Transfer of Care Note    Patient: Chen Levine    Procedure(s) Performed: Procedure(s) (LRB):  ROBOTIC NEPHRECTOMY (Right)    Patient location: PACU    Anesthesia Type: general    Transport from OR: Transported from OR on 2-3 L/min O2 by NC with adequate spontaneous ventilation    Post pain: adequate analgesia    Post assessment: no apparent anesthetic complications    Post vital signs: stable    Level of consciousness: awake    Nausea/Vomiting: no nausea/vomiting    Complications: none    Transfer of care protocol was followed      Last vitals:   Visit Vitals  BP (!) 151/65 (BP Location: Left arm, Patient Position: Sitting)   Pulse 62   Temp 36.8 °C (98.2 °F) (Skin)   Resp 20   Ht 5' (1.524 m)   Wt 102.1 kg (225 lb)   SpO2 95%   Breastfeeding? No   BMI 43.94 kg/m²

## 2019-09-18 NOTE — PLAN OF CARE
Patient transferred to room 403.  Report to ABUNDIO Aguila.  Released from Anesthesia.  Family at bedside.  Patient resting with eyes closed until family enters room.  VSS.  Patient awakens and starts to c/o back pain.

## 2019-09-18 NOTE — PLAN OF CARE
Patient reports that her mother lives with her, physical address is [address] [place] [address] [place], [place], she denies POA or liivng will. Patient reports independence with ADL's. Daughters are next of kin.PCP is Dr. Auguste, pharmacu is [facility] and patient plans to go home with no needs.      09/18/19 1620   Discharge Assessment   Assessment Type Discharge Planning Assessment   Confirmed/corrected address and phone number on facesheet? Yes   Assessment information obtained from? Patient   Communicated expected length of stay with patient/caregiver yes   Prior to hospitilization cognitive status: Alert/Oriented   Prior to hospitalization functional status: Independent   Current cognitive status: Alert/Oriented   Current Functional Status: Independent   Lives With other relative(s)   Able to Return to Prior Arrangements yes   Is patient able to care for self after discharge? Yes   Patient's perception of discharge disposition home or selfcare   Readmission Within the Last 30 Days no previous admission in last 30 days   Patient currently being followed by outpatient case management? No   Patient currently receives any other outside agency services? No   Equipment Currently Used at Home none   Do you have any problems affording any of your prescribed medications? No   Is the patient taking medications as prescribed? yes   Does the patient have transportation home? Yes   Transportation Anticipated family or friend will provide   Does the patient receive services at the [facility]? No   Discharge Plan A Home   DME Needed Upon Discharge  none   Patient/Family in Agreement with Plan yes

## 2019-09-18 NOTE — BRIEF OP NOTE
[facility]  Brief Operative Note    Date: 09/18/2019    Staff Surgeon: Manny Mondragon MD    Bedside Assistant: COREY Martinez    Pre-Op Diagnosis:   1. Right renal mass  2. Morbid obesity    Post-Op Diagnosis: same    Procedure(s) Performed:   Right robot-assisted laparoscopic radical nephrectomy (mod 22)    Specimen(s): right kidney and proximal ureter    Anesthesia: General endotracheal anesthesia, local at incision (0.25% marcaine/1% lidocaine)    Findings: 1 artery/vein, significant medial fat, moderate lateral wall colon adhesions    Estimated Blood Loss: 50cc    Drains: 18fr rubalcava    Complications: none    Disposition: pacu to floor

## 2019-09-19 VITALS
DIASTOLIC BLOOD PRESSURE: 70 MMHG | HEART RATE: 73 BPM | TEMPERATURE: 98 F | BODY MASS INDEX: 44.5 KG/M2 | SYSTOLIC BLOOD PRESSURE: 158 MMHG | WEIGHT: 226.69 LBS | HEIGHT: 60 IN | OXYGEN SATURATION: 95 % | RESPIRATION RATE: 20 BRPM

## 2019-09-19 LAB
ANION GAP SERPL CALC-SCNC: 11 MMOL/L (ref 8–16)
BASOPHILS # BLD AUTO: 0.02 K/UL (ref 0–0.2)
BASOPHILS NFR BLD: 0.1 % (ref 0–1.9)
BUN SERPL-MCNC: 12 MG/DL (ref 8–23)
CALCIUM SERPL-MCNC: 8.5 MG/DL (ref 8.7–10.5)
CHLORIDE SERPL-SCNC: 105 MMOL/L (ref 95–110)
CO2 SERPL-SCNC: 23 MMOL/L (ref 23–29)
CREAT SERPL-MCNC: 0.9 MG/DL (ref 0.5–1.4)
DIFFERENTIAL METHOD: ABNORMAL
EOSINOPHIL # BLD AUTO: 0 K/UL (ref 0–0.5)
EOSINOPHIL NFR BLD: 0 % (ref 0–8)
ERYTHROCYTE [DISTWIDTH] IN BLOOD BY AUTOMATED COUNT: 15.5 % (ref 11.5–14.5)
EST. GFR  (AFRICAN AMERICAN): >60 ML/MIN/1.73 M^2
EST. GFR  (NON AFRICAN AMERICAN): >60 ML/MIN/1.73 M^2
GLUCOSE SERPL-MCNC: 137 MG/DL (ref 70–110)
HCT VFR BLD AUTO: 38.5 % (ref 37–48.5)
HGB BLD-MCNC: 12.5 G/DL (ref 12–16)
IMM GRANULOCYTES # BLD AUTO: 0.17 K/UL (ref 0–0.04)
LYMPHOCYTES # BLD AUTO: 1.5 K/UL (ref 1–4.8)
LYMPHOCYTES NFR BLD: 7.3 % (ref 18–48)
MAGNESIUM SERPL-MCNC: 2 MG/DL (ref 1.6–2.6)
MCH RBC QN AUTO: 28.2 PG (ref 27–31)
MCHC RBC AUTO-ENTMCNC: 32.5 G/DL (ref 32–36)
MCV RBC AUTO: 87 FL (ref 82–98)
MONOCYTES # BLD AUTO: 1.8 K/UL (ref 0.3–1)
MONOCYTES NFR BLD: 8.8 % (ref 4–15)
NEUTROPHILS # BLD AUTO: 17.2 K/UL (ref 1.8–7.7)
NEUTROPHILS NFR BLD: 83 % (ref 38–73)
NRBC BLD-RTO: 0 /100 WBC
PHOSPHATE SERPL-MCNC: 2.8 MG/DL (ref 2.7–4.5)
PLATELET # BLD AUTO: 320 K/UL (ref 150–350)
PMV BLD AUTO: 9.8 FL (ref 9.2–12.9)
POTASSIUM SERPL-SCNC: 3.8 MMOL/L (ref 3.5–5.1)
RBC # BLD AUTO: 4.43 M/UL (ref 4–5.4)
SODIUM SERPL-SCNC: 139 MMOL/L (ref 136–145)
WBC # BLD AUTO: 20.7 K/UL (ref 3.9–12.7)

## 2019-09-19 PROCEDURE — 25000003 PHARM REV CODE 250: Performed by: UROLOGY

## 2019-09-19 PROCEDURE — 84100 ASSAY OF PHOSPHORUS: CPT

## 2019-09-19 PROCEDURE — 80048 BASIC METABOLIC PNL TOTAL CA: CPT

## 2019-09-19 PROCEDURE — 94799 UNLISTED PULMONARY SVC/PX: CPT

## 2019-09-19 PROCEDURE — 83735 ASSAY OF MAGNESIUM: CPT

## 2019-09-19 PROCEDURE — 94640 AIRWAY INHALATION TREATMENT: CPT

## 2019-09-19 PROCEDURE — 63600175 PHARM REV CODE 636 W HCPCS: Performed by: UROLOGY

## 2019-09-19 PROCEDURE — 36415 COLL VENOUS BLD VENIPUNCTURE: CPT

## 2019-09-19 PROCEDURE — 94761 N-INVAS EAR/PLS OXIMETRY MLT: CPT

## 2019-09-19 PROCEDURE — 25000242 PHARM REV CODE 250 ALT 637 W/ HCPCS: Performed by: UROLOGY

## 2019-09-19 PROCEDURE — 99900035 HC TECH TIME PER 15 MIN (STAT)

## 2019-09-19 PROCEDURE — 85025 COMPLETE CBC W/AUTO DIFF WBC: CPT

## 2019-09-19 RX ORDER — LEVOFLOXACIN 500 MG/1
500 TABLET, FILM COATED ORAL DAILY
Qty: 5 TABLET | Refills: 0 | Status: SHIPPED | OUTPATIENT
Start: 2019-09-19 | End: 2019-09-24

## 2019-09-19 RX ORDER — KETOROLAC TROMETHAMINE 30 MG/ML
15 INJECTION, SOLUTION INTRAMUSCULAR; INTRAVENOUS ONCE AS NEEDED
Status: COMPLETED | OUTPATIENT
Start: 2019-09-19 | End: 2019-09-19

## 2019-09-19 RX ORDER — TRAMADOL HYDROCHLORIDE 50 MG/1
50 TABLET ORAL EVERY 8 HOURS PRN
Qty: 15 TABLET | Refills: 0 | Status: ON HOLD | OUTPATIENT
Start: 2019-09-19 | End: 2020-08-31 | Stop reason: ALTCHOICE

## 2019-09-19 RX ORDER — SIMETHICONE 80 MG
80 TABLET,CHEWABLE ORAL 3 TIMES DAILY PRN
Refills: 0 | COMMUNITY
Start: 2019-09-19 | End: 2020-01-15

## 2019-09-19 RX ORDER — ACETAMINOPHEN 325 MG/1
650 TABLET ORAL EVERY 6 HOURS PRN
Refills: 0 | COMMUNITY
Start: 2019-09-19

## 2019-09-19 RX ORDER — DOCUSATE SODIUM 100 MG/1
100 CAPSULE, LIQUID FILLED ORAL 2 TIMES DAILY
Refills: 0 | Status: ON HOLD | COMMUNITY
Start: 2019-09-19 | End: 2020-08-31 | Stop reason: ALTCHOICE

## 2019-09-19 RX ORDER — POLYETHYLENE GLYCOL 3350 17 G/17G
17 POWDER, FOR SOLUTION ORAL DAILY
Qty: 30 EACH | Refills: 0 | Status: SHIPPED | OUTPATIENT
Start: 2019-09-20 | End: 2020-01-15

## 2019-09-19 RX ADMIN — FAMOTIDINE 20 MG: 20 TABLET, FILM COATED ORAL at 08:09

## 2019-09-19 RX ADMIN — TRAMADOL HYDROCHLORIDE 50 MG: 50 TABLET, FILM COATED ORAL at 02:09

## 2019-09-19 RX ADMIN — TRAMADOL HYDROCHLORIDE 50 MG: 50 TABLET, FILM COATED ORAL at 04:09

## 2019-09-19 RX ADMIN — HEPARIN SODIUM 5000 UNITS: 5000 INJECTION, SOLUTION INTRAVENOUS; SUBCUTANEOUS at 08:09

## 2019-09-19 RX ADMIN — PYRIDOXINE HCL TAB 50 MG 50 MG: 50 TAB at 08:09

## 2019-09-19 RX ADMIN — SIMETHICONE CHEW TAB 80 MG 160 MG: 80 TABLET ORAL at 08:09

## 2019-09-19 RX ADMIN — ESCITALOPRAM OXALATE 5 MG: 5 TABLET, FILM COATED ORAL at 08:09

## 2019-09-19 RX ADMIN — POLYETHYLENE GLYCOL 3350 17 G: 17 POWDER, FOR SOLUTION ORAL at 08:09

## 2019-09-19 RX ADMIN — CIPROFLOXACIN 400 MG: 2 INJECTION, SOLUTION INTRAVENOUS at 08:09

## 2019-09-19 RX ADMIN — IPRATROPIUM BROMIDE AND ALBUTEROL SULFATE 3 ML: .5; 3 SOLUTION RESPIRATORY (INHALATION) at 10:09

## 2019-09-19 RX ADMIN — KETOROLAC TROMETHAMINE 15 MG: 30 INJECTION, SOLUTION INTRAMUSCULAR at 09:09

## 2019-09-19 RX ADMIN — DILTIAZEM HYDROCHLORIDE 90 MG: 60 TABLET, FILM COATED ORAL at 08:09

## 2019-09-19 RX ADMIN — DOCUSATE SODIUM 100 MG: 100 CAPSULE, LIQUID FILLED ORAL at 08:09

## 2019-09-19 NOTE — PLAN OF CARE
Problem: Adult Inpatient Plan of Care  Goal: Plan of Care Review  Outcome: Ongoing (interventions implemented as appropriate)  Patient oriented to room, call light within reach, wheels locked, bed in low position, side rails x 3, HOB > 30 degrees, bed alarm set, instructed to call for assistance prn. POC reviewed with patient and daughter, all questions answered, verbalized understanding. VSS. Abdominal surgical dressing CDI. Lap sites CDI. Dominguez to gravity and patent, 3500 mL clear light yellow urine out. Patient ambulated once in room approximately 40 ft, no deficit noted. Patient remained free of fall/injury. Comfort level established, c/o incisional pain while coughing and sneezing controlled with ultram prn and pillows used to brace. IVF and abx infusing per order. Scd in use. Heparin given for VTE. Patient repositioned independently, no new breakdown noted. Will continue to monitor.

## 2019-09-19 NOTE — PLAN OF CARE
09/19/19 1610   Final Note   Assessment Type Final Discharge Note   Anticipated Discharge Disposition Home   Hospital Follow Up  Appt(s) scheduled? Yes

## 2019-09-19 NOTE — PLAN OF CARE
"Problem: Adult Inpatient Plan of Care  Goal: Plan of Care Review  Attempted to go over I S with patient and check sats, she refused stating "she can only do one task at a time" nurse on duty present. Patient was fussy not willing to do any of the services.      "

## 2019-09-19 NOTE — PLAN OF CARE
Problem: Adult Inpatient Plan of Care  Goal: Plan of Care Review  Patient sats 94% on RA/2nd attempt patient refused I S

## 2019-09-19 NOTE — NURSING
0700 rubalcava removed, patient tolerated well. Hat placed in toilet. Patient instructed to call for assistance to ambulate to bathroom. Patient verbalized understanding. Call light in reach.

## 2019-09-19 NOTE — NURSING
POC reviewed with pt, pt verbalized understanding. Safety maintained throughout shift, bed locked and in lowest position, call light in reach, Side rails up X 2. Non skid sock on when OOB. Pt remained free of fall/trauma. VSS, afebrile this shift. Pt self reports of pain treated with PRN pain medication. IVF infused as ordered by MD. Dressing removed to abdomen by MD. incision and lap sites secured by Dermabond at discharge. Discharge instruction, follow up appointments and medication instructions given to pt, pt verbalized understanding. IV discontinued, site asymptomatic, pressure dressing applied. Rx sent to patients pharmacy to be picked up. Pt family transporting pt home. All item gathered and taken at the time of discharge. Wheelchair assistance provided to the car

## 2019-09-20 NOTE — DISCHARGE SUMMARY
[facility]  Urology  Discharge Summary      Patient Name: Chen Levine  MRN: [id number]  Admission Date: 9/18/2019  Hospital Length of Stay: 1 days  Discharge Date and Time: 9/19/2019  4:17 PM  Attending Physician: Manny Mondragon MD  Discharging Provider: Manny Mondragon MD  Primary Care Physician: Adan Angeles MD    HPI:   75 yo F found to have solid Right renal mass >5cm on workup for her persistent leukocytosis. After further imaging workup and extensive discussion of management options, she elected to proceed with robotic lap radical nephrectomy which she presents for.      Procedure(s) (LRB):  ROBOTIC NEPHRECTOMY (Right)     Indwelling Lines/Drains at time of discharge:   Lines/Drains/Airways          None          Hospital Course   Uncomplicated radical nephrectomy despite moderate lateral wall colon adhesions, obesity, and significant intraabdominal/medial fat. Extraction site incision closed in multiple layers.    Convalesced well on med surg floor  Had significant respiratory secretions on intubation and cough postop - aggressive pulm toilet and IS use and prn duonebs.   OOB to chair and tolerated clears POD 1  Main complaint of chronic lumbar back pain exacerbated by hospital bed.   Postop renal function normal with Cr 0.9  1 prn dose toradol used  Given morbid obesity and minimal blood loss and stable postop labs, she was started on heparin DVT ppx pod 0 and used SCDs when in bed.    POD 1 had good 3L UOP overnight, rubalcava DCed, spont voided, lucio reg diet, passing flatus, ambulatory. IVF dced after breakfast  Limited in AM by significant cough, improved after nebulizer. Noted not to have had asthma attack in 10 yrs and felt similar. Again resolved with nebs.  PA/Lat CXR unremarkable. No infiltrates  Did receive 24h postop cipro and HOB remained >30 degrees    Cr remained stable/normal at 0.9 POD 1. Abdominal extraction site incision CDI with expected bruising. Lap sites  CDI, 2 with small drainage (likely residual local with small residual subQ blood. CDI at time of discharge without drainage)  Met all milestones as above, and DCed home in afternoon of POD 1      Pending Diagnostic Studies:     None          Final Active Diagnoses:    Diagnosis Date Noted POA    PRINCIPAL PROBLEM:  Right renal mass [N28.89] 09/18/2019 Yes      Problems Resolved During this Admission:         Discharged Condition: good    Disposition: Home or Self Care    Follow Up:  Follow-up Information     Manny Mondragon MD In 2 weeks.    Specialty:  Urology  Why:  as scheduled (10/1 at 0815)  Contact information:  [address] [place] [address] [address]  [place] [address]  [phone]               Also has scheduled follow up with Dr Barry 6 weeks postop with labs prior  - established nephrology care preop given risk of CKD with solitary kidney and significant comorbities, but renal function remained quite normal postop so encouraged continuing low sodium diet, increasing water intake, and using NSAIDs sparingly      Patient Instructions:      No driving until:   Order Comments: No driving if taking Rx pain meds     Activity as tolerated   Order Comments: 1.  Continue stool softeners/bowel regimen until regular bowel habits return.  2.  Hold aspirin/fish oil/Vitamin E for 3 days  3.  No lifting, pushing, pulling greater than 10 lb for 4 weeks  4.  Okay to shower, pat incisions dry, no soaking/baths until all sutures have dissolved.  - stitches will dissolve on their own, surgical glue over incisions will start to peel up and flake off in 1 week, if not gone after 1 week can help it peel off in the shower daily  - otherwise no ointments or chemicals on incisions  5.  Drink a lot of water     Shower on day dressing removed (No bath)     Medications:  Reconciled Home Medications:      Medication List      START taking these medications    acetaminophen 325 MG tablet  Commonly known as:  TYLENOL  Take 2 tablets (650  mg total) by mouth every 6 (six) hours as needed for Pain.     docusate sodium 100 MG capsule  Commonly known as:  COLACE  Take 1 capsule (100 mg total) by mouth 2 (two) times daily.     levoFLOXacin 500 MG tablet  Commonly known as:  LEVAQUIN  Take 1 tablet (500 mg total) by mouth once daily. for 5 days     polyethylene glycol 17 gram Pwpk  Commonly known as:  GLYCOLAX  Take 17 g by mouth once daily.  Start taking on:  9/20/2019     simethicone 80 MG chewable tablet  Commonly known as:  MYLICON  Take 1 tablet (80 mg total) by mouth 3 (three) times daily as needed for Flatulence. Gas pain/bloating     traMADol 50 mg tablet  Commonly known as:  ULTRAM  Take 1 tablet (50 mg total) by mouth every 8 (eight) hours as needed (pain not relieved by otc agents).        CONTINUE taking these medications    atorvastatin 20 MG tablet  Commonly known as:  LIPITOR  Take 1 tablet (20 mg total) by mouth Daily.     diltiaZEM 90 MG tablet  Commonly known as:  CARDIZEM  Take 1 tablet (90 mg total) by mouth 2 (two) times daily.     escitalopram oxalate 5 MG Tab  Commonly known as:  LEXAPRO  Take 1 tablet (5 mg total) by mouth once daily.     furosemide 40 MG tablet  Commonly known as:  LASIX  Take 1 tablet (40 mg total) by mouth 3 (three) times a week.     gabapentin 600 MG tablet  Commonly known as:  NEURONTIN  Take 600 mg by mouth 3 (three) times daily.     VITAMIN B-6 100 MG Tab  Generic drug:  pyridoxine (vitamin B6)  Take 50 mg by mouth once daily.          ** should check with PCP/cardiology/nephrology about diuretics in setting of solitary kidey    ** given cough/mucus, empirically given 5d outpatient course flouroquinolone (though cxr without evidence of infiltrate)      Time spent on the discharge of patient: 30 minutes    Manny Mondragon MD  Urology  [facility]

## 2019-09-21 NOTE — OP NOTE
[facility] Urology Operative Report     Date: 09/18/2019     Staff Surgeon: Manny Mondragon MD     Bedside Assistant: COREY Martinez     Pre-Op Diagnosis:   1. Right renal mass  2. Morbid obesity (BMI 43.98)     Post-Op Diagnosis: same     Procedure(s) Performed:   Right robot-assisted laparoscopic radical nephrectomy (modifier 22)     Specimen(s): Right kidney and proximal ureter     Anesthesia: General endotracheal anesthesia, local at incision (0.25% marcaine/1% lidocaine)     Findings: right kidney with 1 artery/vein, moderate lateral wall colon adhesions, significant intaabdominal adiposity and medial fat with sticky perihilar fat yielding increased complexity and duration of hilar dissection as well as specimen extraction and closure. (Modifier 22 should be applied for increased complexity and operative time secondary to these factors, as well as adhesiolysis)     Estimated Blood Loss: 50cc     Drains: none     Complications: none     Indications for procedure:  Ms. Levine is a 73 yo F found to have 5.3cm endophytic central right renal mass on imaging workup for chronic leukocytosis. After extensive discussion of management options, she elected to proceed with robotic assisted laparoscopic radical nephrectomy. Prior to surgery, she understood the risks and benefits of the surgery including all the risks of anesthesia, as well as bleeding, infection, scarring, damage to adjacent structures including the liver, the diaphragm, the large and small bowels, and all adjacent neurovascular structures, and postop ileus. She was also made aware of the risks of declining renal function, need for open procedure, hernia. She was made aware of these risks and decided to proceed with the surgery, and informed consent obtained.     Procedure in detail:  After obtaining fully informed consent, the patient was prepped and draped in the right  flank position after placing a 18fr rubalcava catheter under sterile technique.  She was then secured to the bed and all appropriate pressure points were padded. A test roll was performed prior to the procedure. Preoperative antibiotics, cipro, were given and a WHO timeout was performed.      A 12-mm incision was made cephalad and lateral to the patient's umbilicus on her right abdomen to place the 12-mm camera trocar, through which the Veress needle was first used to obtain pneumoperitoneum to 15 mmHg and then trocar placed, and then placed the robotic camera through. We triangulated and placed 2 additional 8-mm robotic trocars. An inferior assistant 12-mm port was placed in the line with the hilum of the kidney in the right upper quadrant, and a 5mm assistant port in right lower quadrant. These were all placed under direct vision. We then placed the patient in full airplane position and docked the robot over the patient's right shoulder.     Once the robot was docked, we then medially reflected the right colon from the field. White line of Toldt unclear given significant lateral wall colon adhesions. Careful adhesiolysis was necessary at this time to drop colon making sure not to drop kidney. She does have an extensive colitis history and mesenteric fat attachments to lateral were significant. After extensive adhesiolysis, incised the colorenal ligaments in standard fashion further mobilizing the kidney in a stepwise manner. Given signifcant intrabdominal adiposity, extended and careful dissection used to free and drop the colon medially beyond usual. There were also adhesions near the liver and upper pole was stuck to liver as well so hepatorenal ligaments carefully lysed. Duodenum identified and kocherized safely out of field.      A horizontal scoring barry was performed using monopolar scissors medial to lower pole. Sweeping laterally, we were able to identify the ureter over the psoas. The assistant suction was used to retract the ureter towards the anterior abdominal wall, placing upward  traction on the right lower pole of the kidney. Gonadal vein was dropped and as it crossed area of dissection, was transected between hemolock clips.     We then systematically marched up from the lower pole of the kidney to the dissection of the hilum. Small pockets of tissue were cauterized using the bipolar to assist in gaining hemostatic exposure to the renal vasculature. She had a significant amount of perihilar fat and draping fat, and after gonadal transected hemostatically as above, renal vein covered in sticky fat. The sticky medial fat lateral to hilum needed careful lysis with bipolar cautery, and to access it given draping perinephric fat, hitch stitch placed in lateral midpole gerota's to elevate kidney more laterally to increase hilar exposure. Extensive dissection through the stick medial perihilar fat and the renal vein was identified with the renal artery just posterolateral to it.  Careful dissection was performed to free up the main arterial trunk and main renal vein, and after gaining circumferential access around these structures, a 35mm endo-clarisa powered Vascular stapler was used to hemostatically transect the renal artery at this time,  followed by the vein. Once transected, the hilum and all staple lines appeared hemostatic.     At this point, with the majority of the lower pole free, the hilum transected, and most of the medial tissue free as well , we focused on the remaining attachments to the kidney.  Attention was turned back to the lower pole and the ureter was identified and skeletonized free of surrounding periureteral tissue.  The ureter was transected hemostatically with ligasure to also seal it.  Remaining lower pole periureteral attachments were transected hemostatically using LigaSure. Lateral attachments were then taken down largely bluntly tracing from the lower pole towards the upper pole.  Attention was turned medially to facilitate dissection of the upper pole of the kidney  away from the adrenal gland.  LigaSure was used to hemostatically take the upper pole attachments after which the kidney was freely mobile. In dissecting the hepatorenals free, small area of medial hepatic capsule avulsed controlled with point monopolar cautery overlied by surgicel.      At this time a 15 mm Endo Catch bag was passed into the abdomen and used to ensnare the kidney and proximal ureter which were removed en bloc with Gerota's fascia intact around it.  At this time the staple lines were inspected and found to be hemostatic and the bed of resection was irrigated and suctioned free of any blood and blood clots.       At this time the robot was undocked and the abdomen was desufflated. An approximate 5cm paramedian vertical abdominal incision was made between assistant port and camera port. Bovie electrocautery was used to dissect down through fat and fascial layers to enter the abdomen, taking caution to identify borders of all layers through fascia to peritoneum. The peritoneum was sharply incised with scissors and hemostat marker identifiers placed on peritoneum edges, and cokers on fascial edges for identification and closure. The Endo Catch bag with specimen was manually removed through the incision. Peritoneum first closed in running fashion with 3-0 vicryl over fish retractor for additional subfsacial strength layer given her body habitus. Fascia then closed in a running fashion with 0-PDS, running a suture from each apex of incision to meet in middle and tie down. Fascia in some areas poor, and significant efforts to clear subcutaneous fat taken so as to not compromise fascial closure, which included muscle layer for strength.  The incision was then copiously irrigated and given depth from skin layer remaining to to body habitus, closed in multiple layers using 2 Vicryl deep dermal sutures in a layer just overlying the fascial closure, and the more superficially for reapproximation, before 4 0  Monocryl subcuticular suture was used to close the skin.  Dermabond was applied over the incision and after it dried, pressure dressing of 4 x 8 and Medipore tape was applied.  The remaining laparoscopic port sites were then closed using 4-0 Monocryl subcuticular sutures as well.  All incisions were overlie by Dermabond.  Her Dominguez catheter was secured to thigh with a StatLock and placed to gravity drainage.     After the fascia was closed the incision was copiously irrigated and local anesthesia with 50/50 mix of 0.25% marcaine and 1% lidocaine was injected into the incision and above the muscle layer before closing the incision in layers as above. Also injected superficially before skin closure     The patient tolerated the procedure well, there were no complications, and she was extubated and taken to PACU in stable condition.       Disposition:  The patient will be kept overnight.  Her Dominguez catheter will be removed in the morning.  Discharge is pending ambulation, tolerating regular diet, and by mouth pain control, which is expected by mid day on postop day 1.

## 2019-09-25 LAB
BLD PROD TYP BPU: NORMAL
BLD PROD TYP BPU: NORMAL
BLOOD UNIT EXPIRATION DATE: NORMAL
BLOOD UNIT EXPIRATION DATE: NORMAL
BLOOD UNIT TYPE CODE: 5100
BLOOD UNIT TYPE CODE: 5100
BLOOD UNIT TYPE: NORMAL
BLOOD UNIT TYPE: NORMAL
CODING SYSTEM: NORMAL
CODING SYSTEM: NORMAL
DISPENSE STATUS: NORMAL
DISPENSE STATUS: NORMAL
NUM UNITS TRANS PACKED RBC: NORMAL
NUM UNITS TRANS PACKED RBC: NORMAL

## 2019-09-30 NOTE — PHYSICIAN QUERY
PT Name: Chen Levine  MR #: [id number]    Physician Query Form - Pathology Findings Clarification     CDS: Patricia Allison RN     Contact information:  [email or web address]    Phone: [phone]:  This form is a permanent document in the medical record.     Query Date: September 30, 2019      By submitting this query, we are merely seeking further clarification of documentation.  Please utilize your independent clinical judgment when addressing the question(s) below.      The medical record contains the following:     Findings Supporting Clinical Information Location in Medical Record   FINAL PATHOLOGIC DIAGNOSIS  RIGHT KIDNEY AND PROXIMAL URETER, RADICAL NEPHRECTOMY:  - Clear cell renal cell carcinoma, histologic grade 2, with focal invasion of a segmental branch of the renal vein.  The vascular, ureteral and Gerota's fascial margins are free of tumor.  - Focal chronic pyelonephritis.  - Mild chronic ureteritis.  KIDNEY, NEPHRECTOMY, CANCER CASE SUMMARY:  PROCEDURE: Radical nephrectomy.  SPECIMEN LATERALITY: Right.  TUMOR SITE: Lower pole.  TUMOR SIZE, GREATEST DIMENSION: 4.0 cm.  TUMOR FOCALITY: Unifocal.  HISTOLOGIC TYPE: Clear cell renal cell carcinoma.  SARCOMATOID FEATURES: Not identified.  RHABDOID FEATURES: Not identified.  HISTOLOGIC GRADE (WH0 / ISUP GRADE): Grade 2:  TUMOR NECROSIS: Not identified.  TUMOR EXTENSION: Tumor extension into major vein (segmental branch of the renal vein). And  MARGINS: Uninvolved by invasive carcinoma.  REGIONAL LYMPH NODES: No lymph nodes submitted or found.  PATHOLOGIC STAGE CLASSIFICATION (pTNM, AJCC 8TH EDITION):  pT3a: Tumor extends into a segmental branch of the renal vein.  pNX: Regional lymph nodes cannot be assessed.  pM: Not applicable.                                                                     Pre-Op Diagnosis:   1. Right renal mass  2. Morbid obesity (BMI 43.98)     Post-Op Diagnosis: same Pathology  report                                                                    Op Note 9/19/19     Please document the clinical significance of the Pathologists findings of __Clear cell renal cell carcinoma__.    [  X ] I agree with the Pathology Findings   [   ] I do not agree with the Pathology Findings   [   ] Other/Clarification of Findings:   [  ] Clinically Undetermined       Please document in your progress notes daily for the duration of treatment until resolved and include in your discharge summary.

## 2019-09-30 NOTE — PROGRESS NOTES
"[facility] Urology Progress Note    Chen Levine is a 74 y.o. female who presents for postop f/u s/p R robotic radical nephrectomy     She was found to have 5.3cm endophytic central right renal mass on imaging workup for chronic leukocytosis. After extensive discussion of management options, she elected to proceed with robotic assisted laparoscopic radical nephrectomy.  9/18/19: Robot assisted lap radical nephrectomy  Did well postop and DCed home POD 1. Renal function stable postop with Cr 0.9  Had established nephrologic care with Dr Barry preop given comorbidities with anticipated solitary kidney    PATH:  Clear cell renal cell carcinoma, histologic grade 2, with focal invasion of a segmental branch of the renal vein. The vascular, ureteral and Gerota's fascial margins are free of tumor.  - Focal chronic pyelonephritis.- Mild chronic ureteritis.  [id number]    She returns today noting:  She feels well, "terrific" even.   Good energy  Normal eating and bowel habits  Has significantly increased water intake. Drinking water day and night and up til bedtime  Having some increased stress incontinence, leaks slightly with moving. 1/2ppd ELIANA, slightly increased from baseline. 1 nocturnal accident  No hematuria dysuria  No abdominal pain    ROS: A comprehensive 10 system review was performed and is negative except as noted above in HPI    PHYSICAL EXAM:    Vitals:    10/01/19 0816   BP: 119/74   Pulse: 78   Resp: 18   Temp: 98.5 °F (36.9 °C)     Body mass index is 41.59 kg/m². Weight: 96.6 kg (212 lb 15.4 oz) Height: 5' (152.4 cm)       General: Alert, cooperative, no distress, appears stated age   Head: Normocephalic, without obvious abnormality, atraumatic   Eyes: PERRL, conjunctiva/corneas clear   Lungs: Respirations unlabored   Heart: Warm and well perfused   Abdomen: soft NT ND 1+ pannus, lap sites well healed still with dermabond in place, as is extraction site incision, no hernia  Extremities: Extremities " normal, atraumatic, no cyanosis or edema   Skin: Skin color, texture, turgor normal, no rashes or lesions   Psych: Appropriate   Neurologic: Non-focal       ASSESSMENT   1. Renal cell carcinoma of right kidney  Comprehensive metabolic panel    CT Chest Abdomen Pelvis With Contrast   2. Stress incontinence  Urine culture       Plan    Doing well s/p radical nephrectomy. Cental clear cell RCC limited to kidney with focal segmental vein invasion, making it pT3. Resected with negative margins.  Reviewed screening recommendations as below for stage 3 RCC.  Will RTC 6 mos with CT chest abdomen w contrast and labs prior.     Has regular hemonc follow up, and continued surveillance can be continued with them with CXR/CT abdomen after initial surveillance.  Started to peel some dermabond off incisions and reviewed this process with pt and daughter - she had been afraid to.    She did establish care with nephrology preop and has appt pending later this month with repeat labs prior though renal function remained normal stable in immed postop period  As well her baseline mild ELIANA has increased and we did review kegel exercises  As well, advised sometimes infection and cause worsening incontinence in absence of other symptoms so will check Ucx  Reports some urinary leakage has been a problem and offered urogyn referral for further eval of ELIANA but deferred and will work on kegels and dec fluid intake in evenings  Will chart check ucx    Stage 3 kidney cancer (pT3 RCC)  Baseline CT or MRI abdomen within 3-6 months, and then Q 3-6 mos x3 yrs and annually until 5y.   Baseline CT chest within 3-6 months, and then CXR or CT chest Q 3-6 mos x3 yrs and annually until 5y.   CMP Q6 mos x 2 year then annually to 5y

## 2019-09-30 NOTE — PHYSICIAN QUERY
PT Name: Chen Levine  MR #: [id number]    Physician Query Form - Pathology Findings Clarification     CDS: Patricia Allison RN     Contact information:  [email or web address]    Phone: [phone]:  This form is a permanent document in the medical record.     Query Date: September 30, 2019      By submitting this query, we are merely seeking further clarification of documentation.  Please utilize your independent clinical judgment when addressing the question(s) below.      The medical record contains the following:     Findings Supporting Clinical Information Location in Medical Record   FINAL PATHOLOGIC DIAGNOSIS  RIGHT KIDNEY AND PROXIMAL URETER, RADICAL NEPHRECTOMY:  - Clear cell renal cell carcinoma, histologic grade 2, with focal invasion of a segmental branch of the renal vein.  The vascular, ureteral and Gerota's fascial margins are free of tumor.  - Focal chronic pyelonephritis.  - Mild chronic ureteritis.  KIDNEY, NEPHRECTOMY, CANCER CASE SUMMARY:  PROCEDURE: Radical nephrectomy.  SPECIMEN LATERALITY: Right.  TUMOR SITE: Lower pole.  TUMOR SIZE, GREATEST DIMENSION: 4.0 cm.  TUMOR FOCALITY: Unifocal.  HISTOLOGIC TYPE: Clear cell renal cell carcinoma.  SARCOMATOID FEATURES: Not identified.  RHABDOID FEATURES: Not identified.  HISTOLOGIC GRADE (WH0 / ISUP GRADE): Grade 2:  TUMOR NECROSIS: Not identified.  TUMOR EXTENSION: Tumor extension into major vein (segmental branch of the renal vein). And  MARGINS: Uninvolved by invasive carcinoma.  REGIONAL LYMPH NODES: No lymph nodes submitted or found.  PATHOLOGIC STAGE CLASSIFICATION (pTNM, AJCC 8TH EDITION):  pT3a: Tumor extends into a segmental branch of the renal vein.  pNX: Regional lymph nodes cannot be assessed.  pM: Not applicable.                                                                     Pre-Op Diagnosis:   1. Right renal mass  2. Morbid obesity (BMI 43.98)     Post-Op Diagnosis: same Pathology  report                                                                    Op Note 9/19/19     Please document the clinical significance of the Pathologists findings of __focal invasion of a segmental branch of the renal vein_.    [ X  ] I agree with the Pathology Findings   [   ] I do not agree with the Pathology Findings   [   ] Other/Clarification of Findings:   [  ] Clinically Undetermined       Please document in your progress notes daily for the duration of treatment until resolved and include in your discharge summary.

## 2019-10-01 ENCOUNTER — OFFICE VISIT (OUTPATIENT)
Dept: UROLOGY | Facility: CLINIC | Age: 74
End: 2019-10-01
Payer: MEDICARE

## 2019-10-01 VITALS
BODY MASS INDEX: 41.81 KG/M2 | HEART RATE: 78 BPM | DIASTOLIC BLOOD PRESSURE: 74 MMHG | WEIGHT: 212.94 LBS | TEMPERATURE: 99 F | SYSTOLIC BLOOD PRESSURE: 119 MMHG | RESPIRATION RATE: 18 BRPM | HEIGHT: 60 IN

## 2019-10-01 DIAGNOSIS — N39.3 STRESS INCONTINENCE: ICD-10-CM

## 2019-10-01 DIAGNOSIS — C64.1 RENAL CELL CARCINOMA OF RIGHT KIDNEY: Primary | ICD-10-CM

## 2019-10-01 PROCEDURE — 87086 URINE CULTURE/COLONY COUNT: CPT

## 2019-10-01 PROCEDURE — 99999 PR PBB SHADOW E&M-EST. PATIENT-LVL IV: ICD-10-PCS | Mod: PBBFAC,,, | Performed by: UROLOGY

## 2019-10-01 PROCEDURE — 99024 PR POST-OP FOLLOW-UP VISIT: ICD-10-PCS | Mod: S$GLB,,, | Performed by: UROLOGY

## 2019-10-01 PROCEDURE — 99999 PR PBB SHADOW E&M-EST. PATIENT-LVL IV: CPT | Mod: PBBFAC,,, | Performed by: UROLOGY

## 2019-10-01 PROCEDURE — 99024 POSTOP FOLLOW-UP VISIT: CPT | Mod: S$GLB,,, | Performed by: UROLOGY

## 2019-10-01 NOTE — PATIENT INSTRUCTIONS
"  Cut off fluid intake 2 hours before bed    Avoid/limit salt/sodium    Continue good water intake    Help remove dermabond by peeling off "crust" in shower or after softening with moist washcloth    Kegel exercises dont need special clothing or equipment. Theyre easy to learn and simple to do. And if you do them right, no one can tell youre doing them, so they can be done almost anywhere. Your healthcare provider, nurse, or physical therapist can answer any questions you have and help you get started.    A weak pelvic floor   The pelvic floor muscles may weaken due to aging, pregnancy and vaginal childbirth, injury, surgery, chronic cough, or lack of exercise. If the pelvic floor is weak, your bladder and other pelvic organs may sag out of place. The urethra may also open too easily and allow urine to leak out. Kegel exercises can help you strengthen your pelvic floor muscles. Then they can better support the pelvic organs and control urine flow.  How Kegel exercises are done  Try each of the Kegel exercises described below. When youre doing them, try not to move your leg, buttock, or stomach muscles.  · Contract as if you were stopping your urine stream. But do it when youre not urinating.  · Tighten your rectum as if trying not to pass gas. Contract your anus, but dont move your buttocks.  · You may place a finger or 2 in the vagina and squeeze your finger with your vagina to learn which muscles to tighten.  Try to hold each Kegel for a slow count to 5. You probably wont be able to hold them for that long at first. But keep practicing. It will get easier as your pelvic floor gets stronger. Eventually, special weights that you place in your vagina may be recommended to help make your Kegels even more effective. Visit your healthcare provider if you have difficulties doing Kegel exercises.  Helpful hints  Here are some tips to follow:  · Do your Kegels as often as you can. The more you do them, the faster " youll feel the results.  · Pick an activity you do often as a reminder. For instance, do your Kegels every time you sit down.  · Tighten your pelvic floor before you sneeze, get up from a chair, cough, laugh, or lift. This protects your pelvic floor from injury and can help prevent urine leakage.   Date Last Reviewed: 8/5/2015  © 0983-3661 [employer]. [address] [place] [address], [place] [id number]. All rights reserved. This information is not intended as a substitute for professional medical care. Always follow your healthcare professional's instructions.

## 2019-10-02 LAB
BACTERIA UR CULT: NORMAL
BACTERIA UR CULT: NORMAL

## 2019-10-09 ENCOUNTER — OFFICE VISIT (OUTPATIENT)
Dept: HEMATOLOGY/ONCOLOGY | Facility: CLINIC | Age: 74
End: 2019-10-09
Payer: MEDICARE

## 2019-10-09 VITALS
SYSTOLIC BLOOD PRESSURE: 127 MMHG | DIASTOLIC BLOOD PRESSURE: 71 MMHG | TEMPERATURE: 98 F | WEIGHT: 221 LBS | HEART RATE: 69 BPM | BODY MASS INDEX: 43.16 KG/M2 | OXYGEN SATURATION: 96 %

## 2019-10-09 DIAGNOSIS — Z86.03 HISTORY OF NEOPLASM OF BLADDER: ICD-10-CM

## 2019-10-09 DIAGNOSIS — C64.1 RENAL CANCER, RIGHT: Primary | ICD-10-CM

## 2019-10-09 DIAGNOSIS — D72.9 NEUTROPHILIC LEUKOCYTOSIS: ICD-10-CM

## 2019-10-09 DIAGNOSIS — Z90.5 HISTORY OF UNILATERAL NEPHRECTOMY: ICD-10-CM

## 2019-10-09 PROCEDURE — 3074F SYST BP LT 130 MM HG: CPT | Mod: S$GLB,,, | Performed by: INTERNAL MEDICINE

## 2019-10-09 PROCEDURE — 1111F DSCHRG MED/CURRENT MED MERGE: CPT | Mod: S$GLB,,, | Performed by: INTERNAL MEDICINE

## 2019-10-09 PROCEDURE — 1101F PR PT FALLS ASSESS DOC 0-1 FALLS W/OUT INJ PAST YR: ICD-10-PCS | Mod: S$GLB,,, | Performed by: INTERNAL MEDICINE

## 2019-10-09 PROCEDURE — 99214 OFFICE O/P EST MOD 30 MIN: CPT | Mod: S$GLB,,, | Performed by: INTERNAL MEDICINE

## 2019-10-09 PROCEDURE — 1101F PT FALLS ASSESS-DOCD LE1/YR: CPT | Mod: S$GLB,,, | Performed by: INTERNAL MEDICINE

## 2019-10-09 PROCEDURE — 3078F DIAST BP <80 MM HG: CPT | Mod: S$GLB,,, | Performed by: INTERNAL MEDICINE

## 2019-10-09 PROCEDURE — 3074F PR MOST RECENT SYSTOLIC BLOOD PRESSURE < 130 MM HG: ICD-10-PCS | Mod: S$GLB,,, | Performed by: INTERNAL MEDICINE

## 2019-10-09 PROCEDURE — 3078F PR MOST RECENT DIASTOLIC BLOOD PRESSURE < 80 MM HG: ICD-10-PCS | Mod: S$GLB,,, | Performed by: INTERNAL MEDICINE

## 2019-10-09 PROCEDURE — 1111F PR DISCHARGE MEDS RECONCILED W/ CURRENT OUTPATIENT MED LIST: ICD-10-PCS | Mod: S$GLB,,, | Performed by: INTERNAL MEDICINE

## 2019-10-09 PROCEDURE — 99214 PR OFFICE/OUTPT VISIT, EST, LEVL IV, 30-39 MIN: ICD-10-PCS | Mod: S$GLB,,, | Performed by: INTERNAL MEDICINE

## 2019-10-10 NOTE — PROGRESS NOTES
[facility] History & Physical    Subjective:      Patient ID:   Chen Levine  74 y.o. female  1945  Alanna Valadez      Chief Complaint:   WBC increased    HPI:  74 y.o. female with increased WBC 14,100.  On repeat determination the   WBC count was 12,600.  Then 13,000.  Now 20,000.  She has spastic colitis hx with cramps.  HTN, asthma as a child.  Bladder cancer ,Dr. Jose Stahl, treated with surgery.  She has stress incontinence.  DVT hx in her 20's.    B6 was low, started on B 6 po.  Energy 5/10.    S/P neck fusion.  LBP with injections, residual numbness down R anterior Leg.  Partial hysterectomy.  Onset of menses age 13.  M0.    TSH slightly increased at 5.43.  Dr. Valadez will review TSH and make decisions regards need for synthroid or not?    CAT and U/S confirm 5.3 x 4.8 cm mass R kidney.  Per radiologist 80% probability of malignancy.  Refer to Dr. Mondragon of  for evaluation.  Her sister had nephrectomy for renal cancer.    She had  R renal mass surgery 19.  Now 3 weeks post op.  Renal Ca, 4 cm, renal vein involvement,  Margin clear, confined in kidney, grade 1.    Clinical Stage 3 dx.    Mamm 19 NL.    Smoked 1/2 ppd x's 57 years.  She does not want referral to smoking clinic.ETOH no.  Allergy Phenergan, PCN, sulfa.  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    Mom breast Ca, macular degeneration.  Dad CVA.  Sister x's 2, LBP.  1 sister renal cancer.  Daughter Breast cancer, epilepsy, heart dx.  Daughter hypothyroid dx., cholesterol, 40# benign ovarian tumor.      ROS:   GEN: normal without any fever, night sweats or weight loss  HEENT: normal with no HA's, sore throat, stiff neck, changes in vision  CV: normal with no CP, SOB, PND, VIDES or orthopnea  PULM: normal with no SOB, cough, hemoptysis, sputum or pleuritic pain  GI: normal with no abdominal pain, nausea, vomiting, constipation, diarrhea, melanotic stools, BRBPR, or hematemesis  : See HPI  BREAST: normal with  no mass, discharge, pain  SKIN: normal with no rash, erythema, bruising, or swelling     Past Medical History:   Diagnosis Date    Anxiety     Asthma     childhood currently resolved    Cancer     bladder    Depression     Hyperlipidemia     Hypertension     Wears partial dentures     UPPER PARTIAL     Past Surgical History:   Procedure Laterality Date    BLADDER SURGERY  1985    c-spine fusion      HYSTERECTOMY  1977    ROBOT-ASSISTED LAPAROSCOPIC NEPHRECTOMY Right 9/18/2019    Procedure: ROBOTIC NEPHRECTOMY;  Surgeon: Manny Mondragon MD;  Location: [facility];  Service: Urology;  Laterality: Right;    SPINE SURGERY  1987       Review of patient's allergies indicates:   Allergen Reactions    Penicillins Itching and Rash    Promethazine Itching and Rash    Sulfa (sulfonamide antibiotics) Itching and Rash           Current Outpatient Medications:     acetaminophen (TYLENOL) 325 MG tablet, Take 2 tablets (650 mg total) by mouth every 6 (six) hours as needed for Pain. (Patient not taking: Reported on 10/1/2019), Disp: , Rfl: 0    atorvastatin (LIPITOR) 20 MG tablet, Take 1 tablet (20 mg total) by mouth Daily., Disp: 90 tablet, Rfl: 1    diltiaZEM (CARDIZEM) 90 MG tablet, Take 1 tablet (90 mg total) by mouth 2 (two) times daily., Disp: 180 tablet, Rfl: 1    docusate sodium (COLACE) 100 MG capsule, Take 1 capsule (100 mg total) by mouth 2 (two) times daily. (Patient not taking: Reported on 10/1/2019), Disp: , Rfl: 0    escitalopram oxalate (LEXAPRO) 5 MG Tab, Take 1 tablet (5 mg total) by mouth once daily., Disp: 90 tablet, Rfl: 1    furosemide (LASIX) 40 MG tablet, Take 1 tablet (40 mg total) by mouth 3 (three) times a week., Disp: 90 tablet, Rfl: 1    gabapentin (NEURONTIN) 600 MG tablet, Take 600 mg by mouth 3 (three) times daily., Disp: , Rfl:     polyethylene glycol (GLYCOLAX) 17 gram PwPk, Take 17 g by mouth once daily. (Patient not taking: Reported on 10/1/2019), Disp: 30 each, Rfl: 0     pyridoxine, vitamin B6, (VITAMIN B-6) 100 MG Tab, Take 50 mg by mouth once daily., Disp: , Rfl:     simethicone (MYLICON) 80 MG chewable tablet, Take 1 tablet (80 mg total) by mouth 3 (three) times daily as needed for Flatulence. Gas pain/bloating (Patient not taking: Reported on 10/1/2019), Disp: , Rfl: 0    traMADol (ULTRAM) 50 mg tablet, Take 1 tablet (50 mg total) by mouth every 8 (eight) hours as needed (pain not relieved by otc agents). (Patient not taking: Reported on 10/1/2019), Disp: 15 tablet, Rfl: 0          Objective:   Vitals:  Blood pressure 127/71, pulse 69, temperature 97.9 °F (36.6 °C), weight 100.2 kg (221 lb), SpO2 96 %.    Physical Examination:   GEN: no apparent distress, comfortable  HEAD: atraumatic and normocephalic  EYES: no pallor, no icterus  ENT:  no pharyngeal erythema, external ears WNL; no nasal discharge  NECK: no masses, thyroid normal, trachea midline, no LAD/LN's, supple  CV: RRR with no murmur; normal pulse; normal S1 and S2; no pedal edema  CHEST: Normal respiratory effort; CTAB; normal breath sounds; no wheeze or crackles  ABDOM: nontender and nondistended; soft;  no rebound/guarding, incisions healing  MUSC/Skeletal: ROM normal; no crepitus; joints normal; no deformities   EXTREM: no clubbing, cyanosis, inflammation or swelling  SKIN: no rashes, lesions, ulcers, petechiae   : no cvat  NEURO: grossly intact; motor/sensory WNL;  no tremors  PSYCH: normal mood, affect and behavior  LYMPH: normal cervical, supraclavicular, axillary and groin LN's  BREASTS: L & R no palpable mass    Path report as above.    WBC 13,100.  ,000. H/H 13.1/39.5.  Creatinine .88  TSH 4.95, started on Synthroid, Dr. Valadez.    Assessment:   (1) 74 y.o. female with diagnosis of WBC 14,100. Gradually increasing from 8,900 since 2/2017.  Slight increased monocytes.  On repeat determination the WBC 13,100. Now.  B6 is low at 2.7.  Began B6 50 mg daily and re check CBC in 3-4 months.  Defer bone  marrow exam for now.    (2)Consider leukemoid reaction, myeloproliferative syndrome including CML and CMML.    (3) 2 first degree relatives with Breast Cancer.  Discussed BRCA 1&2 testing.  Her insurance carrier would not authorize BRCA 1 or 2.    (4)TSH increased 5.43, Dr. Valadez to review thyroid status,  He started her on Synthroid.    (5)neuropathy sx at R anterior thigh.  Dr. Valadez aware, and to evaluate later, when pt agrees.    (6)R renal Ca, Stage 3 dx, S/P radical nephrectomy.    RTC 1 month.  Observation vs adjuvant Rx?         I have explained and the patient understands all of  the current recommendation(s). I have answered all of their questions to the best of my ability and to their complete satisfaction.

## 2019-11-06 ENCOUNTER — OFFICE VISIT (OUTPATIENT)
Dept: HEMATOLOGY/ONCOLOGY | Facility: CLINIC | Age: 74
End: 2019-11-06
Payer: MEDICARE

## 2019-11-06 VITALS
WEIGHT: 217 LBS | HEART RATE: 69 BPM | TEMPERATURE: 97 F | SYSTOLIC BLOOD PRESSURE: 145 MMHG | DIASTOLIC BLOOD PRESSURE: 77 MMHG | BODY MASS INDEX: 42.38 KG/M2 | OXYGEN SATURATION: 95 %

## 2019-11-06 DIAGNOSIS — C64.1 RENAL CELL CANCER, RIGHT: ICD-10-CM

## 2019-11-06 PROCEDURE — 3077F PR MOST RECENT SYSTOLIC BLOOD PRESSURE >= 140 MM HG: ICD-10-PCS | Mod: S$GLB,,, | Performed by: INTERNAL MEDICINE

## 2019-11-06 PROCEDURE — 1101F PR PT FALLS ASSESS DOC 0-1 FALLS W/OUT INJ PAST YR: ICD-10-PCS | Mod: S$GLB,,, | Performed by: INTERNAL MEDICINE

## 2019-11-06 PROCEDURE — 3078F DIAST BP <80 MM HG: CPT | Mod: S$GLB,,, | Performed by: INTERNAL MEDICINE

## 2019-11-06 PROCEDURE — 3077F SYST BP >= 140 MM HG: CPT | Mod: S$GLB,,, | Performed by: INTERNAL MEDICINE

## 2019-11-06 PROCEDURE — 99214 PR OFFICE/OUTPT VISIT, EST, LEVL IV, 30-39 MIN: ICD-10-PCS | Mod: S$GLB,,, | Performed by: INTERNAL MEDICINE

## 2019-11-06 PROCEDURE — 99214 OFFICE O/P EST MOD 30 MIN: CPT | Mod: S$GLB,,, | Performed by: INTERNAL MEDICINE

## 2019-11-06 PROCEDURE — 1101F PT FALLS ASSESS-DOCD LE1/YR: CPT | Mod: S$GLB,,, | Performed by: INTERNAL MEDICINE

## 2019-11-06 PROCEDURE — 3078F PR MOST RECENT DIASTOLIC BLOOD PRESSURE < 80 MM HG: ICD-10-PCS | Mod: S$GLB,,, | Performed by: INTERNAL MEDICINE

## 2019-11-07 NOTE — PROGRESS NOTES
[facility] History & Physical    Subjective:      Patient ID:   Chen Levine  74 y.o. female  1945  Alanna Valadez      Chief Complaint:   WBC increased    HPI:  74 y.o. female with increased WBC 14,100.  On repeat determination the   WBC count was 12,600.  Then 13,000.  Now 20,000.  She has spastic colitis hx with cramps.  HTN, asthma as a child.  Bladder cancer ,Dr. Jose Stahl, treated with surgery.  She has stress incontinence.  DVT hx in her 20's.    B6 was low, started on B 6 po.  Energy /10.    S/P neck fusion.  LBP with injections, residual numbness down R anterior Leg.  Partial hysterectomy.  Onset of menses age 13.  M0.    TSH slightly increased at 5.43.  Dr. Valadez will review TSH and make decisions regards need for synthroid or not?    CAT and U/S confirm 5.3 x 4.8 cm mass R kidney.  Per radiologist 80% probability of malignancy.  Refer to Dr. Mondragon of  for evaluation.  Her sister had nephrectomy for renal cancer.    She had  R renal mass surgery 20.  Now 3 weeks post op.  Renal Ca, 4 cm, renal vein involvement,  Margin clear, confined in kidney, grade 1.    Clinical Stage 3 dx.    She has URI sx, bronchitis.  Cover with levaquin reduced dosage for 1 kidney.    Mamm 19 NL.    Smoked 1/2 ppd x's 57 years.  She does not want referral to smoking clinic.ETOH no.  Allergy Phenergan, PCN, sulfa.  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    Mom breast Ca, macular degeneration.  Dad CVA.  Sister x's 2, LBP.  1 sister renal cancer.  Daughter Breast cancer, epilepsy, heart dx.  Daughter hypothyroid dx., cholesterol, 40# benign ovarian tumor.      ROS:   GEN: normal without any fever, night sweats or weight loss  HEENT: normal with no HA's, sore throat, stiff neck, changes in vision  CV: normal with no CP, SOB, PND, VIDES or orthopnea  PULM: normal with no SOB, cough, hemoptysis, sputum or pleuritic pain  GI: normal with no abdominal pain, nausea, vomiting, constipation,  diarrhea, melanotic stools, BRBPR, or hematemesis  : See HPI  BREAST: normal with no mass, discharge, pain  SKIN: normal with no rash, erythema, bruising, or swelling     Past Medical History:   Diagnosis Date    Anxiety     Asthma     childhood currently resolved    Cancer     bladder    Depression     Hyperlipidemia     Hypertension     Wears partial dentures     UPPER PARTIAL     Past Surgical History:   Procedure Laterality Date    BLADDER SURGERY  1985    c-spine fusion      HYSTERECTOMY  1977    ROBOT-ASSISTED LAPAROSCOPIC NEPHRECTOMY Right 9/18/2019    Procedure: ROBOTIC NEPHRECTOMY;  Surgeon: Manny Mondragon MD;  Location: [facility];  Service: Urology;  Laterality: Right;    SPINE SURGERY  1987       Review of patient's allergies indicates:   Allergen Reactions    Penicillins Itching and Rash    Promethazine Itching and Rash    Sulfa (sulfonamide antibiotics) Itching and Rash           Current Outpatient Medications:     acetaminophen (TYLENOL) 325 MG tablet, Take 2 tablets (650 mg total) by mouth every 6 (six) hours as needed for Pain. (Patient not taking: Reported on 10/1/2019), Disp: , Rfl: 0    atorvastatin (LIPITOR) 20 MG tablet, Take 1 tablet (20 mg total) by mouth Daily., Disp: 90 tablet, Rfl: 1    diltiaZEM (CARDIZEM) 90 MG tablet, Take 1 tablet (90 mg total) by mouth 2 (two) times daily., Disp: 180 tablet, Rfl: 1    docusate sodium (COLACE) 100 MG capsule, Take 1 capsule (100 mg total) by mouth 2 (two) times daily. (Patient not taking: Reported on 10/1/2019), Disp: , Rfl: 0    escitalopram oxalate (LEXAPRO) 5 MG Tab, Take 1 tablet (5 mg total) by mouth once daily., Disp: 90 tablet, Rfl: 1    furosemide (LASIX) 40 MG tablet, Take 1 tablet (40 mg total) by mouth 3 (three) times a week., Disp: 90 tablet, Rfl: 1    gabapentin (NEURONTIN) 600 MG tablet, Take 600 mg by mouth 3 (three) times daily., Disp: , Rfl:     polyethylene glycol (GLYCOLAX) 17 gram PwPk, Take 17 g by mouth  once daily. (Patient not taking: Reported on 10/1/2019), Disp: 30 each, Rfl: 0    pyridoxine, vitamin B6, (VITAMIN B-6) 100 MG Tab, Take 50 mg by mouth once daily., Disp: , Rfl:     simethicone (MYLICON) 80 MG chewable tablet, Take 1 tablet (80 mg total) by mouth 3 (three) times daily as needed for Flatulence. Gas pain/bloating (Patient not taking: Reported on 10/1/2019), Disp: , Rfl: 0    traMADol (ULTRAM) 50 mg tablet, Take 1 tablet (50 mg total) by mouth every 8 (eight) hours as needed (pain not relieved by otc agents). (Patient not taking: Reported on 10/1/2019), Disp: 15 tablet, Rfl: 0          Objective:   Vitals:  Blood pressure (!) 145/77, pulse 69, temperature 97.2 °F (36.2 °C), weight 98.4 kg (217 lb), SpO2 95 %.    Physical Examination:   GEN: no apparent distress, comfortable  HEAD: atraumatic and normocephalic  EYES: no pallor, no icterus  ENT:  no pharyngeal erythema, external ears WNL; no nasal discharge  NECK: no masses, thyroid normal, trachea midline, no LAD/LN's, supple  CV: RRR with no murmur; normal pulse; normal S1 and S2; no pedal edema  CHEST: Normal respiratory effort; CTAB; normal breath sounds; no wheeze or crackles  ABDOM: nontender and nondistended; soft;  no rebound/guarding, incisions healing  MUSC/Skeletal: ROM normal; no crepitus; joints normal; no deformities   EXTREM: no clubbing, cyanosis, inflammation or swelling  SKIN: no rashes, lesions, ulcers, petechiae   : no cvat  NEURO: grossly intact; motor/sensory WNL;  no tremors  PSYCH: normal mood, affect and behavior  LYMPH: normal cervical, supraclavicular, axillary and groin LN's  BREASTS: L & R no palpable mass    Path report as above.    WBC 13,100.  ,000. H/H 13.1/39.5.  Creatinine .88  TSH 4.95, started on SynthroidDr. Valadez.    Assessment:   (1) 74 y.o. female with diagnosis of WBC 14,100. Gradually increasing from 8,900 since 2/2017.  Slight increased monocytes.  On repeat determination the WBC 13,100. Now.  B6  is low at 2.7.  Began B6 50 mg daily and re check CBC in 3-4 months.  Defer bone marrow exam for now.    (2)Consider leukemoid reaction, myeloproliferative syndrome including CML and CMML.    (3) 2 first degree relatives with Breast Cancer.  Discussed BRCA 1&2 testing.  Her insurance carrier would not authorize BRCA 1 or 2.    (4)TSH increased 5.43, Dr. Valadez to review thyroid status,  He started her on Synthroid.    (5)neuropathy sx at R anterior thigh.  Dr. Valadez aware, and to evaluate later, when pt agrees.    (6)R renal Ca, Stage 3 dx, S/P radical nephrectomy.  Start working on Sutent 25 mg 1 po daily for 4/6 weeks x's 1 year.  Increase dosage as tolerated.    (7)URI sx, trial of Levaquin 250 mg, 2 on day 1,   1 on day 2-10.    RTC 1 month.         I have explained and the patient understands all of  the current recommendation(s). I have answered all of their questions to the best of my ability and to their complete satisfaction.

## 2019-11-13 ENCOUNTER — TELEPHONE (OUTPATIENT)
Dept: UROLOGY | Facility: CLINIC | Age: 74
End: 2019-11-13

## 2019-11-14 NOTE — TELEPHONE ENCOUNTER
Seen by Nephrology, Dr. Barry, 10/15/19    10/9 - cr 0.88, k 4.1, na 140  Stage 2 ckd non proteinuric  Focus on bp control  Fu 6 weeks or prn

## 2020-01-08 ENCOUNTER — OFFICE VISIT (OUTPATIENT)
Dept: HEMATOLOGY/ONCOLOGY | Facility: CLINIC | Age: 75
End: 2020-01-08
Payer: MEDICARE

## 2020-01-08 VITALS
SYSTOLIC BLOOD PRESSURE: 123 MMHG | WEIGHT: 218.19 LBS | RESPIRATION RATE: 19 BRPM | TEMPERATURE: 98 F | DIASTOLIC BLOOD PRESSURE: 63 MMHG | BODY MASS INDEX: 42.61 KG/M2 | HEART RATE: 63 BPM

## 2020-01-08 DIAGNOSIS — N18.2 ANEMIA ASSOCIATED WITH STAGE 2 CHRONIC RENAL FAILURE: ICD-10-CM

## 2020-01-08 DIAGNOSIS — D72.829 LEUKOCYTOSIS, UNSPECIFIED TYPE: ICD-10-CM

## 2020-01-08 DIAGNOSIS — L97.919 ULCERS OF BOTH LOWER LEGS: ICD-10-CM

## 2020-01-08 DIAGNOSIS — D51.8 ANEMIA OF DECREASED VITAMIN B12 ABSORPTION: ICD-10-CM

## 2020-01-08 DIAGNOSIS — Z86.2 HISTORY OF LEUKOCYTOSIS: ICD-10-CM

## 2020-01-08 DIAGNOSIS — E07.9 THYROID DISEASE: ICD-10-CM

## 2020-01-08 DIAGNOSIS — D72.821 MONOCYTOSIS: ICD-10-CM

## 2020-01-08 DIAGNOSIS — C64.1 RENAL CELL CANCER, RIGHT: Primary | ICD-10-CM

## 2020-01-08 DIAGNOSIS — D63.1 ANEMIA ASSOCIATED WITH STAGE 2 CHRONIC RENAL FAILURE: ICD-10-CM

## 2020-01-08 DIAGNOSIS — L97.929 ULCERS OF BOTH LOWER LEGS: ICD-10-CM

## 2020-01-08 DIAGNOSIS — D50.0 IRON DEFICIENCY ANEMIA DUE TO CHRONIC BLOOD LOSS: ICD-10-CM

## 2020-01-08 PROCEDURE — 3074F PR MOST RECENT SYSTOLIC BLOOD PRESSURE < 130 MM HG: ICD-10-PCS | Mod: S$GLB,,, | Performed by: INTERNAL MEDICINE

## 2020-01-08 PROCEDURE — 1159F MED LIST DOCD IN RCRD: CPT | Mod: S$GLB,,, | Performed by: INTERNAL MEDICINE

## 2020-01-08 PROCEDURE — 3078F DIAST BP <80 MM HG: CPT | Mod: S$GLB,,, | Performed by: INTERNAL MEDICINE

## 2020-01-08 PROCEDURE — 3074F SYST BP LT 130 MM HG: CPT | Mod: S$GLB,,, | Performed by: INTERNAL MEDICINE

## 2020-01-08 PROCEDURE — 1101F PT FALLS ASSESS-DOCD LE1/YR: CPT | Mod: S$GLB,,, | Performed by: INTERNAL MEDICINE

## 2020-01-08 PROCEDURE — 1126F PR PAIN SEVERITY QUANTIFIED, NO PAIN PRESENT: ICD-10-PCS | Mod: S$GLB,,, | Performed by: INTERNAL MEDICINE

## 2020-01-08 PROCEDURE — 3078F PR MOST RECENT DIASTOLIC BLOOD PRESSURE < 80 MM HG: ICD-10-PCS | Mod: S$GLB,,, | Performed by: INTERNAL MEDICINE

## 2020-01-08 PROCEDURE — 1159F PR MEDICATION LIST DOCUMENTED IN MEDICAL RECORD: ICD-10-PCS | Mod: S$GLB,,, | Performed by: INTERNAL MEDICINE

## 2020-01-08 PROCEDURE — 99214 OFFICE O/P EST MOD 30 MIN: CPT | Mod: S$GLB,,, | Performed by: INTERNAL MEDICINE

## 2020-01-08 PROCEDURE — 1126F AMNT PAIN NOTED NONE PRSNT: CPT | Mod: S$GLB,,, | Performed by: INTERNAL MEDICINE

## 2020-01-08 PROCEDURE — 1101F PR PT FALLS ASSESS DOC 0-1 FALLS W/OUT INJ PAST YR: ICD-10-PCS | Mod: S$GLB,,, | Performed by: INTERNAL MEDICINE

## 2020-01-08 PROCEDURE — 99214 PR OFFICE/OUTPT VISIT, EST, LEVL IV, 30-39 MIN: ICD-10-PCS | Mod: S$GLB,,, | Performed by: INTERNAL MEDICINE

## 2020-01-09 NOTE — PROGRESS NOTES
" [facility] History & Physical    Subjective:      Patient ID:   Chen Levine  74 y.o. female  1945  Alanna Valadez [place], [place]      Chief Complaint:   Renal cancer    HPI:  74 y.o. female with increased WBC chronically, 13,000-20,000.  She has spastic colitis hx with cramps.  HTN, asthma as a child.  Bladder cancer ,Dr. Jose Stahl, treated with surgery.  She has stress incontinence.  DVT hx in her 20's.    B6 was low, started on B 6 po.  Energy 5/10.    S/P neck fusion.  LBP with injections, residual numbness down R anterior Leg.  Partial hysterectomy.  Onset of menses age 13.  M0.    TSH slightly increased at 5.43.  Dr. Valadez will review TSH and make decisions regards need for synthroid or not?    CAT and U/S confirm 5.3 x 4.8 cm mass R kidney.  Per radiologist 80% probability of malignancy.  Referred to Dr. Mondragon of  for evaluation.  Her sister had nephrectomy for renal cancer.    She had  R renal mass surgery 19.     Renal Ca, 4 cm, renal vein involvement,  Margin clear, confined in kidney, grade 1.    She was to begin Sutent 25 mg daily adjuvantly and titrate up as tolerated.  But no financial aid or co pay assistance could be gotten.  She could not afford it, and went with observation.    Clinical Stage 3 dx.    C/O 2 month hs of "boils" on legs, similar event 3 years ago.  Saw Dr. Jama, referred to her for eval?  Pyoderma lesions, enterococcus C/S .    Mamm 19 NL.    Smoked 1/2 ppd x's 57 years.  She does not want referral to smoking clinic.ETOH no.  Allergy Phenergan, PCN, sulfa.  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    Mom breast Ca, macular degeneration.  Dad CVA.  Sister x's 2, LBP.  1 sister renal cancer.  Daughter Breast cancer, epilepsy, heart dx.  Daughter hypothyroid dx., cholesterol, 40# benign ovarian tumor.      ROS:   GEN: normal without any fever, night sweats or weight loss  HEENT: normal with no HA's, sore throat, stiff neck, " changes in vision  CV: normal with no CP, SOB, PND, VIDES or orthopnea  PULM: normal with no SOB, cough, hemoptysis, sputum or pleuritic pain  GI: normal with no abdominal pain, nausea, vomiting, constipation, diarrhea, melanotic stools, BRBPR, or hematemesis  : See HPI  BREAST: normal with no mass, discharge, pain  SKIN: normal with no rash, erythema, bruising, or swelling     Past Medical History:   Diagnosis Date    Anxiety     Asthma     childhood currently resolved    Cancer     bladder    Depression     Hyperlipidemia     Hypertension     Wears partial dentures     UPPER PARTIAL     Past Surgical History:   Procedure Laterality Date    BLADDER SURGERY  1985    c-spine fusion      HYSTERECTOMY  1977    ROBOT-ASSISTED LAPAROSCOPIC NEPHRECTOMY Right 9/18/2019    Procedure: ROBOTIC NEPHRECTOMY;  Surgeon: Manny Mondragon MD;  Location: [facility];  Service: Urology;  Laterality: Right;    SPINE SURGERY  1987       Review of patient's allergies indicates:   Allergen Reactions    Penicillins Itching and Rash    Promethazine Itching and Rash    Sulfa (sulfonamide antibiotics) Itching and Rash           Current Outpatient Medications:     acetaminophen (TYLENOL) 325 MG tablet, Take 2 tablets (650 mg total) by mouth every 6 (six) hours as needed for Pain., Disp: , Rfl: 0    atorvastatin (LIPITOR) 20 MG tablet, Take 1 tablet (20 mg total) by mouth Daily., Disp: 90 tablet, Rfl: 1    diltiaZEM (CARDIZEM) 90 MG tablet, Take 1 tablet (90 mg total) by mouth 2 (two) times daily., Disp: 180 tablet, Rfl: 1    docusate sodium (COLACE) 100 MG capsule, Take 1 capsule (100 mg total) by mouth 2 (two) times daily., Disp: , Rfl: 0    furosemide (LASIX) 40 MG tablet, Take 1 tablet (40 mg total) by mouth 3 (three) times a week., Disp: 90 tablet, Rfl: 1    gabapentin (NEURONTIN) 600 MG tablet, Take 600 mg by mouth 3 (three) times daily., Disp: , Rfl:     polyethylene glycol (GLYCOLAX) 17 gram PwPk, Take 17 g by mouth  once daily., Disp: 30 each, Rfl: 0    pyridoxine, vitamin B6, (VITAMIN B-6) 100 MG Tab, Take 50 mg by mouth once daily., Disp: , Rfl:     simethicone (MYLICON) 80 MG chewable tablet, Take 1 tablet (80 mg total) by mouth 3 (three) times daily as needed for Flatulence. Gas pain/bloating, Disp: , Rfl: 0    traMADol (ULTRAM) 50 mg tablet, Take 1 tablet (50 mg total) by mouth every 8 (eight) hours as needed (pain not relieved by otc agents)., Disp: 15 tablet, Rfl: 0    escitalopram oxalate (LEXAPRO) 5 MG Tab, Take 1 tablet (5 mg total) by mouth once daily., Disp: 90 tablet, Rfl: 1          Objective:   Vitals:  Blood pressure 123/63, pulse 63, temperature 98.2 °F (36.8 °C), temperature source Oral, resp. rate 19, weight 99 kg (218 lb 3.2 oz).    Physical Examination:   GEN: no apparent distress, comfortable  HEAD: atraumatic and normocephalic  EYES: no pallor, no icterus  ENT:  no pharyngeal erythema, external ears WNL; no nasal discharge  NECK: no masses, thyroid normal, trachea midline, no LAD/LN's, supple  CV: RRR with no murmur; normal pulse; normal S1 and S2; no pedal edema  CHEST: Normal respiratory effort; CTAB; normal breath sounds; no wheeze or crackles  ABDOM: nontender and nondistended; soft;  no rebound/guarding, incisions healing  MUSC/Skeletal: ROM normal; no crepitus; joints normal; no deformities   EXTREM: no clubbing, cyanosis, inflammation or swelling  SKIN: no rashes, lesions, ulcers, petechiae   : no cvat  NEURO: grossly intact; motor/sensory WNL;  no tremors  PSYCH: normal mood, affect and behavior  LYMPH: normal cervical, supraclavicular, axillary and groin LN's  BREASTS: L & R no palpable mass    Path report as above.    WBC 13,100.  ,000. H/H 13.1/39.5.  Creatinine .88  TSH 4.95, started on SynthroidDr. Valadez.    Assessment:   (1) 74 y.o. female with diagnosis of WBC 13,800. Gradually increasing from 8,900 since 2/2017.  Slight increased monocytes.  On repeat determination the WBC  13,800. Now.  B6 was low at 2.7.  Began B6 50 mg daily.  Defer bone marrow exam for now.    (2)Consider leukemoid reaction, myeloproliferative syndrome including CML and CMML.    (3) 2 first degree relatives with Breast Cancer.  Discussed BRCA 1&2 testing.  Her insurance carrier would not authorize BRCA 1 or 2.    (4)TSH increased 5.43, Dr. Valadez to review thyroid status,  He started her on Synthroid.    (5)neuropathy sx at R anterior thigh.  Dr. Valadez aware, and to evaluate later, when pt agrees.    (6)R renal Ca, Stage 3 dx, S/P radical nephrectomy.  Worked on financial aid  for Sutent 25 mg 1 po daily for 4/6 weeks x's 1 year.  Increase dosage as tolerated.  She could not afford sutent, co pay.    Dr. Mondragon has her scheduled for follow up CAT scans.    Lab re eval ordered at [facility] for increased WBC,  Diff Dx as above.    Refer to Dr. Jama, leg lesions, pyoderma and enterococcus C/S hx 2017.    RTC 1 month.

## 2020-01-11 LAB
ALBUMIN: 3.9 GRAM/DL (ref 3.5–5)
ALP SERPL-CCNC: 96 UNIT/L (ref 38–126)
ALT SERPL W P-5'-P-CCNC: 13 UNIT/L (ref 7–56)
ANION GAP SERPL CALC-SCNC: 16 MEQ/L (ref 9–18)
AST SERPL-CCNC: 16 UNIT/L (ref 7–40)
BASOPHILS ABSOLUTE COUNT: 0.1 K/UL (ref 0–0.2)
BASOPHILS NFR BLD: 0.9 % (ref 0–2)
BILIRUB SERPL-MCNC: 0.3 MG/DL (ref 0–1.2)
BUN BLD-MCNC: 14 MG/DL (ref 7–21)
BUN/CREAT SERPL: 16 RATIO (ref 6–22)
CALC OSMOLALITY: 276 MOSM/KG (ref 275–295)
CALCIUM SERPL-MCNC: 9 MG/DL (ref 8.5–10.3)
CHLORIDE SERPL-SCNC: 99 MEQ/L (ref 98–107)
CO2 SERPL-SCNC: 27 MEQ/L (ref 21–31)
CREAT SERPL-MCNC: 0.9 MG/DL (ref 0.5–1)
DIFFERENTIAL TYPE: ABNORMAL
EOSINOPHIL NFR BLD: 1.2 % (ref 0–4)
EOSINOPHILS ABSOLUTE COUNT: 0.2 K/UL (ref 0–0.7)
EPO SERPL-ACNC: 7.5 MIU/ML (ref 2.6–18.5)
ERYTHROCYTE [DISTWIDTH] IN BLOOD BY AUTOMATED COUNT: 15.6 GRAM/DL (ref 12–15.3)
FERRITIN SERPL-MCNC: 84.8 NANOGRAM/ML (ref 13–150)
FOLIC ACID LEVEL: 4.7 NANOGRAM/ML (ref 4.2–19.9)
GFR: 62.2 ML/MIN/1.73M2
GLUCOSE SERPL-MCNC: 91 MG/DL (ref 70–100)
HCT VFR BLD AUTO: 39.3 % (ref 37–47)
HGB BLD-MCNC: 12.7 GRAM/DL (ref 12–16)
LYMPHOCYTES %: 20.3 % (ref 15–45)
LYMPHOCYTES ABSOLUTE COUNT: 2.8 K/UL (ref 1–4.2)
MCH RBC QN AUTO: 27.4 PICOGRAM (ref 27–33)
MCHC RBC AUTO-ENTMCNC: 32.4 GRAM/DL (ref 32–36)
MCV RBC AUTO: 84.6 FEMTOLITER (ref 81–99)
MONOCYTES %: 9.3 % (ref 3–13)
MONOCYTES ABSOLUTE COUNT: 1.3 K/UL (ref 0.1–0.8)
NEUTROPHILS ABSOLUTE COUNT: 9.4 K/UL (ref 2.1–7.6)
NEUTROPHILS RELATIVE PERCENT: 68.3 % (ref 32–80)
PLATELET # BLD AUTO: 477 K/UL (ref 150–350)
PMV BLD AUTO: 7.9 FEMTOLITER (ref 7–10.2)
POTASSIUM SERPL-SCNC: 4.3 MEQ/L (ref 3.5–5)
PRIOR RESULT: NORMAL
RBC # BLD AUTO: 4.65 MIL/UL (ref 4.2–5.4)
RETICS #: NORMAL CELLS/UL (ref 20000–80000)
RETICS/RBC NFR AUTO: 1.7 %
SODIUM BLD-SCNC: 138 MEQ/L (ref 135–145)
SPECIMEN SOURCE: NORMAL
T(9;22)(ABL1,BCR) BLD/T: NORMAL
T(9;22)(ABL1,BCR) MAJOR BLD/T QL: NOT DETECTED
T(9;22)(ABL1,BCR) MINOR BLD/T QL: NOT DETECTED
T(ABL1,BCR)P210/CONTROL (IS) BLD/T: 0 %
T(ABL1,BCR)P210/CONTROL BLD/T: 0 %
TOTAL PROTEIN: 7.2 GRAM/DL (ref 6.3–8.2)
TSH SERPL DL<=0.005 MIU/L-ACNC: 2.55 UIL/ML (ref 0.35–4)
VITAMIN B12: 469.5 PICOGRAM/ML (ref 232–1245)
WBC # BLD AUTO: 13.8 K/UL (ref 4.5–11)

## 2020-01-15 ENCOUNTER — INITIAL CONSULT (OUTPATIENT)
Dept: INFECTIOUS DISEASES | Facility: CLINIC | Age: 75
End: 2020-01-15
Payer: MEDICARE

## 2020-01-15 ENCOUNTER — LAB VISIT (OUTPATIENT)
Dept: LAB | Facility: HOSPITAL | Age: 75
End: 2020-01-15
Attending: INTERNAL MEDICINE
Payer: MEDICARE

## 2020-01-15 VITALS
WEIGHT: 218 LBS | DIASTOLIC BLOOD PRESSURE: 57 MMHG | HEART RATE: 77 BPM | TEMPERATURE: 98 F | HEIGHT: 61 IN | BODY MASS INDEX: 41.16 KG/M2 | SYSTOLIC BLOOD PRESSURE: 108 MMHG | OXYGEN SATURATION: 98 %

## 2020-01-15 DIAGNOSIS — L08.0 PYODERMA: Primary | ICD-10-CM

## 2020-01-15 DIAGNOSIS — I87.8 CHRONIC VENOUS STASIS: ICD-10-CM

## 2020-01-15 DIAGNOSIS — C64.1 RENAL CELL CANCER, RIGHT: ICD-10-CM

## 2020-01-15 DIAGNOSIS — L08.0 PYODERMA: ICD-10-CM

## 2020-01-15 PROCEDURE — 87075 CULTR BACTERIA EXCEPT BLOOD: CPT

## 2020-01-15 PROCEDURE — 87077 CULTURE AEROBIC IDENTIFY: CPT

## 2020-01-15 PROCEDURE — 1159F MED LIST DOCD IN RCRD: CPT | Mod: S$GLB,,, | Performed by: INTERNAL MEDICINE

## 2020-01-15 PROCEDURE — 87102 FUNGUS ISOLATION CULTURE: CPT

## 2020-01-15 PROCEDURE — 99214 OFFICE O/P EST MOD 30 MIN: CPT | Mod: S$GLB,,, | Performed by: INTERNAL MEDICINE

## 2020-01-15 PROCEDURE — 99214 PR OFFICE/OUTPT VISIT, EST, LEVL IV, 30-39 MIN: ICD-10-PCS | Mod: S$GLB,,, | Performed by: INTERNAL MEDICINE

## 2020-01-15 PROCEDURE — 87070 CULTURE OTHR SPECIMN AEROBIC: CPT

## 2020-01-15 PROCEDURE — 3074F PR MOST RECENT SYSTOLIC BLOOD PRESSURE < 130 MM HG: ICD-10-PCS | Mod: S$GLB,,, | Performed by: INTERNAL MEDICINE

## 2020-01-15 PROCEDURE — 1126F PR PAIN SEVERITY QUANTIFIED, NO PAIN PRESENT: ICD-10-PCS | Mod: S$GLB,,, | Performed by: INTERNAL MEDICINE

## 2020-01-15 PROCEDURE — 87186 SC STD MICRODIL/AGAR DIL: CPT

## 2020-01-15 PROCEDURE — 3078F DIAST BP <80 MM HG: CPT | Mod: S$GLB,,, | Performed by: INTERNAL MEDICINE

## 2020-01-15 PROCEDURE — 1159F PR MEDICATION LIST DOCUMENTED IN MEDICAL RECORD: ICD-10-PCS | Mod: S$GLB,,, | Performed by: INTERNAL MEDICINE

## 2020-01-15 PROCEDURE — 1101F PR PT FALLS ASSESS DOC 0-1 FALLS W/OUT INJ PAST YR: ICD-10-PCS | Mod: S$GLB,,, | Performed by: INTERNAL MEDICINE

## 2020-01-15 PROCEDURE — 87206 SMEAR FLUORESCENT/ACID STAI: CPT

## 2020-01-15 PROCEDURE — 87147 CULTURE TYPE IMMUNOLOGIC: CPT

## 2020-01-15 PROCEDURE — 1126F AMNT PAIN NOTED NONE PRSNT: CPT | Mod: S$GLB,,, | Performed by: INTERNAL MEDICINE

## 2020-01-15 PROCEDURE — 87118 MYCOBACTERIC IDENTIFICATION: CPT

## 2020-01-15 PROCEDURE — 3074F SYST BP LT 130 MM HG: CPT | Mod: S$GLB,,, | Performed by: INTERNAL MEDICINE

## 2020-01-15 PROCEDURE — 1101F PT FALLS ASSESS-DOCD LE1/YR: CPT | Mod: S$GLB,,, | Performed by: INTERNAL MEDICINE

## 2020-01-15 PROCEDURE — 87116 MYCOBACTERIA CULTURE: CPT

## 2020-01-15 PROCEDURE — 3078F PR MOST RECENT DIASTOLIC BLOOD PRESSURE < 80 MM HG: ICD-10-PCS | Mod: S$GLB,,, | Performed by: INTERNAL MEDICINE

## 2020-01-15 RX ORDER — LEVOTHYROXINE SODIUM 50 UG/1
TABLET ORAL
COMMUNITY
Start: 2019-11-14

## 2020-01-15 NOTE — LETTER
January 17, 2020      CARMEN Nunes MD  [address]  [address]  [place] [address]           [facility]  [address]  [place] [address]  Phone: [phone]  Fax: [phone]          Patient: Cehn Levine   MR Number: [id number]   YOB: 1945   Date of Visit: 1/15/2020       Dear Dr. CARMEN Nunes:    Thank you for referring Chen Levine to me for evaluation. Attached you will find relevant portions of my assessment and plan of care.    If you have questions, please do not hesitate to call me. I look forward to following Chen Levine along with you.    Sincerely,    Bernice Jama MD    Enclosure  CC:  No Recipients    If you would like to receive this communication electronically, please contact [email or web address] or [phone] to request more information on [employer] Link access.    For providers and/or their staff who would like to refer a patient to [facility], please contact us through our one-stop-shop provider referral line, [facility], at [phone].    If you feel you have received this communication in error or would no longer like to receive these types of communications, please e-mail [email or web address]

## 2020-01-15 NOTE — PROGRESS NOTES
Subjective:      Reason for consult:  Skin lesions    HPI: Chen Levine is a 74 y.o. female   Seen in 2017 for multiple lesions of pyoderma of LE with 3 biopsies without diagnosis and treatment with prolonged zyvox (for repeatedly isolated enterococcus) and lamisil empirically by her primary care doctor, finally with resolution  She was referred by Dr. Sam Nunes for recurrence of the same lesions, after diagnosis and resection of right renal cell carcinoma.  This was discovered during a workup for leukocytosis.  Flow cytometry was ordered but then subsequently not done.  Her white blood cells were over 20,000 at the time of her surgery but are now down to 13,000.  She is not receiving any chemotherapy and has not been able to afford the Sutent recommended.  Started to develop the same lesions about 6 weeks ago and she does not associate them with trauma or contamination by water or soil or animals etc she has not received any steroids other than perhaps a lumbar spine epidural injection.  She unfortunately to still smokes cigarettes    Review of patient's allergies indicates:   Allergen Reactions    Penicillins Itching and Rash    Promethazine Itching and Rash    Sulfa (sulfonamide antibiotics) Itching and Rash     Past Medical History:   Diagnosis Date    Anxiety     Asthma     childhood currently resolved    Cancer 1984    bladder    Chronic venous stasis     Colon polyps     Deep venous thrombosis 1970'S    In the leg    Degenerative disc disease at L5-S1 level     Depression     Hyperlipidemia     Hypertension     Obesity     Overactive bladder     Pyoderma [id number]    No histological diagnosis    Renal cell cancer, right 09/2019    Wears partial dentures     UPPER PARTIAL     Past Surgical History:   Procedure Laterality Date    BLADDER SURGERY  1985    c-spine fusion      EPIDURAL STEROID INJECTION  2019    x2     HYSTERECTOMY  1977    ROBOT-ASSISTED LAPAROSCOPIC NEPHRECTOMY  "Right 9/18/2019    Procedure: ROBOTIC NEPHRECTOMY;  Surgeon: Manny Mondragon MD;  Location: [facility];  Service: Urology;  Laterality: Right;    SPINE SURGERY  1987      Social History     Tobacco Use    Smoking status: Current Every Day Smoker     Packs/day: 0.75     Types: Cigarettes    Smokeless tobacco: Never Used   Substance Use Topics    Alcohol use: No     Family History   Problem Relation Age of Onset    Cancer Mother     Vision loss Mother     Hypertension Father     Stroke Father        Pertinent medications noted:     Review of Systems    Ability to give history is:  Good  No chills, fever, sweats, unintentional weight loss  No change in vision,    No sinus congestion,   No pain in mouth or throat. No problems with teeth, gums.   No chest pain,   Chronic cough,  no sputum production, still smokes  No nausea, vomiting, diarrhea,  or focal abd pain.    No swelling of joints, redness of joints, injuries, or new focal pain  No unusual headaches,  neuropathy, falls    anxiety, depression,   No diabetes,   No bleeding, lymphadenopathy, anemia,   Outdoor activities:  Implants:          Objective:      Blood pressure (!) 108/57, pulse 77, temperature 98.1 °F (36.7 °C), temperature source Temporal, height 5' 1" (1.549 m), weight 98.9 kg (218 lb), SpO2 98 %. Body mass index is 41.19 kg/m².  Physical Exam      General: Alert and attentive, cooperative and in no distress    Eyes: Pupils equal, round, reactive to light, anicteric, EOMI    Neck: Supple, non-tender, no thyromegaly or masses    ENT: EAC patent, TM normal, nares patent, no oral lesions, , no thrush    Cardiovascular: Regular rate and rhythm, no murmurs, rubs, or gallop    Respiratory: Lungs clear without wheezes, rales, rubs or rhonci    Gastrointestinal:  Soft, bowel sounds normal, obese no mass or organomegaly, no tenderness or distention    Genitourinary:   no flank tenderness    Integumentary:             1/15/20: I prepped with " chlorhexidine, unroofed the largest 3 of the lesions and was able to express some pus from the largest one only. They are all tender. There are no lesions above her knees. There are hyperpigmented scars from prior venous stasis and prior pyodermatous lesions     Vascular: No pitting peripheral edema or phlebitis, warm and well perfused    Musculoskeletal: Ambulates without difficulty, no acute arthritis, synovitis or myositis.     Lymphatic: No cervical, axillary, or inguinal LAD    Neurological: Normal LOC, cranial nerves, speech,   gait    Psychiatric: Normal mood, speech,  demeanor     Wound:see above    VAD:       General Labs reviewed:  Lab Results   Component Value Date    WBC 13.8 (H) 01/08/2020    RBC 4.65 01/08/2020    HGB 12.7 01/08/2020    HCT 39.3 01/08/2020    MCV 84.6 01/08/2020    MCH 27.4 01/08/2020    MCHC 32.4 01/08/2020    RDW 15.6 (H) 01/08/2020     (H) 01/08/2020    MPV 7.9 01/08/2020    GRAN 17.2 (H) 09/19/2019    GRAN 83.0 (H) 09/19/2019    LYMPH 1.5 09/19/2019    LYMPH 7.3 (L) 09/19/2019    MONO 1.8 (H) 09/19/2019    MONO 8.8 09/19/2019    EOS 0.0 09/19/2019    BASO 0.02 09/19/2019    EOSINOPHIL 1.2 01/08/2020    BASOPHIL 0.9 01/08/2020     CMP  Sodium   Date Value Ref Range Status   01/08/2020 138 135 - 145 mEq/L Final     Potassium   Date Value Ref Range Status   01/08/2020 4.3 3.5 - 5.0 mEq/L Final     Chloride   Date Value Ref Range Status   01/08/2020 99 98 - 107 mEq/L Final     CO2   Date Value Ref Range Status   01/08/2020 27 21 - 31 mEq/L Final     Glucose   Date Value Ref Range Status   01/08/2020 91 70 - 100 mg/dL Final     BUN   Date Value Ref Range Status   01/08/2020 14 7 - 21 mg/dL Final     Creatinine   Date Value Ref Range Status   01/08/2020 0.9 0.5 - 1.0 mg/dL Final     Calcium   Date Value Ref Range Status   01/08/2020 9.0 8.5 - 10.3 mg/dL Final     Total Protein   Date Value Ref Range Status   01/08/2020 7.2 6.3 - 8.2 gram/dL Final   07/13/2005 7.9 6.0 - 8.4 gm/dl  Final     Albumin   Date Value Ref Range Status   07/13/2005 4.7 3.5 - 5.2 g/dl Final     ALBUMIN   Date Value Ref Range Status   01/08/2020 3.9 3.5 - 5.0 gram/dL Final     Total Bilirubin   Date Value Ref Range Status   01/08/2020 0.3 0.0 - 1.2 mg/dL Final     Comment:     Possible interference observed for Total Bilirubin with Immunoglobulin  G (IgG) with concentration above 28 g/L  (187 micromol/L).       Alkaline Phosphatase   Date Value Ref Range Status   01/08/2020 96 38 - 126 unit/L Final     AST   Date Value Ref Range Status   01/08/2020 16 7 - 40 unit/L Final     ALT   Date Value Ref Range Status   01/08/2020 13 7 - 56 unit/L Final     Anion Gap   Date Value Ref Range Status   01/08/2020 16 9 - 18 mEq/L Final     eGFR if    Date Value Ref Range Status   09/19/2019 >60 >60 mL/min/1.73 m^2 Final     eGFR if non    Date Value Ref Range Status   09/19/2019 >60 >60 mL/min/1.73 m^2 Final     Comment:     Calculation used to obtain the estimated glomerular filtration  rate (eGFR) is the CKD-EPI equation.              Micro: growing Staph aureus  Microbiology Results (last 7 days)     Procedure Component Value Units Date/Time    Aerobic culture [[id number]]     Order Status:  No result Specimen:  Abscess from Leg, Right     Culture, Anaerobic [[id number]]     Order Status:  No result Specimen:  Abscess from Leg, Right     AFB Culture & Smear [[id number]]     Order Status:  No result Specimen:  Abscess from Leg, Right         Imaging Reviewed:  CT abd/pelvis        Assessment:       Problem List Items Addressed This Visit     Renal cell cancer, right    Chronic venous stasis    Pyoderma - Primary    Relevant Orders    Aerobic culture    Culture, Anaerobic    Fungus culture    AFB Culture & Smear    Aerobic culture (Completed)    AFB Culture & Smear    Fungus culture    Culture, Anaerobic           Plan:           Pyoderma  -     Aerobic culture  -     Culture, Anaerobic  -     Fungus  culture; Future; Expected date: 01/15/2020  -     AFB Culture & Smear  -     Aerobic culture; Future; Expected date: 01/15/2020  -     AFB Culture & Smear; Future; Expected date: 01/15/2020  -     Fungus culture; Future; Expected date: 01/15/2020  -     Culture, Anaerobic; Future; Expected date: 01/15/2020    Chronic venous stasis    Renal cell cancer, right    Other orders  -     doxycycline (VIBRAMYCIN) 100 MG Cap; Take 1 capsule (100 mg total) by mouth 2 (two) times daily.  Dispense: 20 capsule; Refill: 0    Soap and water to clean the spots on your shins  No antibiotic ointment please  Please make an appointment ASAP with Dr. Lopes for a biopsy    Return in 2 weeks     I will ask Dr. Nunes to proceed with the blood work looking for abnormal blood cells    Please drop the cultures off downstairs to the lab. The orders are waiting there.   Addendum: the cultures I took are growing STaph aureus. I asked her not to fill/take the doxycycline until after the dermatologist takes a biopsy. She agrees. They are not getting worse. 1/17    This note was created using Dragon voice recognition software that occasionally misinterpreted phrases or words.

## 2020-01-15 NOTE — PATIENT INSTRUCTIONS
Soap and water to clean the spots on your shins  No antibiotic ointment please  Please make an appointment ASAP with Dr. Lopes for a biopsy    Return in 2 weeks     I will ask Dr. Nunes to proceed with the blood work looking for abnormal blood cells    Please drop the cultures off downstairs to the lab. The orders are waiting there.

## 2020-01-17 RX ORDER — DOXYCYCLINE 100 MG/1
100 CAPSULE ORAL 2 TIMES DAILY
Qty: 20 CAPSULE | Refills: 0 | Status: SHIPPED | OUTPATIENT
Start: 2020-01-17 | End: 2020-08-28

## 2020-01-18 LAB — BACTERIA SPEC AEROBE CULT: ABNORMAL

## 2020-01-20 LAB — BACTERIA SPEC ANAEROBE CULT: NORMAL

## 2020-01-29 ENCOUNTER — OFFICE VISIT (OUTPATIENT)
Dept: INFECTIOUS DISEASES | Facility: CLINIC | Age: 75
End: 2020-01-29
Payer: MEDICARE

## 2020-01-29 VITALS
WEIGHT: 220 LBS | SYSTOLIC BLOOD PRESSURE: 130 MMHG | HEIGHT: 61 IN | HEART RATE: 63 BPM | OXYGEN SATURATION: 98 % | BODY MASS INDEX: 41.54 KG/M2 | DIASTOLIC BLOOD PRESSURE: 73 MMHG | TEMPERATURE: 98 F

## 2020-01-29 DIAGNOSIS — L08.0 PYODERMA: Primary | ICD-10-CM

## 2020-01-29 DIAGNOSIS — I87.8 CHRONIC VENOUS STASIS: ICD-10-CM

## 2020-01-29 DIAGNOSIS — Z79.2 ENCOUNTER FOR LONG-TERM (CURRENT) USE OF ANTIBIOTICS: ICD-10-CM

## 2020-01-29 DIAGNOSIS — A49.02 MRSA (METHICILLIN RESISTANT STAPHYLOCOCCUS AUREUS): ICD-10-CM

## 2020-01-29 DIAGNOSIS — C64.1 RENAL CELL CANCER, RIGHT: ICD-10-CM

## 2020-01-29 PROCEDURE — 1126F AMNT PAIN NOTED NONE PRSNT: CPT | Mod: S$GLB,,, | Performed by: INTERNAL MEDICINE

## 2020-01-29 PROCEDURE — 1126F PR PAIN SEVERITY QUANTIFIED, NO PAIN PRESENT: ICD-10-PCS | Mod: S$GLB,,, | Performed by: INTERNAL MEDICINE

## 2020-01-29 PROCEDURE — 1159F PR MEDICATION LIST DOCUMENTED IN MEDICAL RECORD: ICD-10-PCS | Mod: S$GLB,,, | Performed by: INTERNAL MEDICINE

## 2020-01-29 PROCEDURE — 3075F PR MOST RECENT SYSTOLIC BLOOD PRESS GE 130-139MM HG: ICD-10-PCS | Mod: S$GLB,,, | Performed by: INTERNAL MEDICINE

## 2020-01-29 PROCEDURE — 3078F DIAST BP <80 MM HG: CPT | Mod: S$GLB,,, | Performed by: INTERNAL MEDICINE

## 2020-01-29 PROCEDURE — 99214 OFFICE O/P EST MOD 30 MIN: CPT | Mod: S$GLB,,, | Performed by: INTERNAL MEDICINE

## 2020-01-29 PROCEDURE — 3075F SYST BP GE 130 - 139MM HG: CPT | Mod: S$GLB,,, | Performed by: INTERNAL MEDICINE

## 2020-01-29 PROCEDURE — 99214 PR OFFICE/OUTPT VISIT, EST, LEVL IV, 30-39 MIN: ICD-10-PCS | Mod: S$GLB,,, | Performed by: INTERNAL MEDICINE

## 2020-01-29 PROCEDURE — 1101F PR PT FALLS ASSESS DOC 0-1 FALLS W/OUT INJ PAST YR: ICD-10-PCS | Mod: S$GLB,,, | Performed by: INTERNAL MEDICINE

## 2020-01-29 PROCEDURE — 1159F MED LIST DOCD IN RCRD: CPT | Mod: S$GLB,,, | Performed by: INTERNAL MEDICINE

## 2020-01-29 PROCEDURE — 3078F PR MOST RECENT DIASTOLIC BLOOD PRESSURE < 80 MM HG: ICD-10-PCS | Mod: S$GLB,,, | Performed by: INTERNAL MEDICINE

## 2020-01-29 PROCEDURE — 1101F PT FALLS ASSESS-DOCD LE1/YR: CPT | Mod: S$GLB,,, | Performed by: INTERNAL MEDICINE

## 2020-01-29 RX ORDER — DOXYCYCLINE 100 MG/1
100 CAPSULE ORAL 2 TIMES DAILY
Qty: 60 CAPSULE | Refills: 1 | Status: SHIPPED | OUTPATIENT
Start: 2020-01-29 | End: 2020-08-28

## 2020-01-29 NOTE — PROGRESS NOTES
"Subjective:      Reason for consult:  Skin lesions    HPI: Chen Levine is a 74 y.o. female   Seen in 2017 for multiple lesions of pyoderma of LE with 3 biopsies without diagnosis and treatment with prolonged zyvox (for repeatedly isolated enterococcus) and lamisil empirically by her primary care doctor, finally with resolution  She was referred by Dr. Sam Nunes for recurrence of the same lesions, after diagnosis and resection of right renal cell carcinoma.  This was discovered during a workup for leukocytosis.  Flow cytometry was ordered but then subsequently not done.  Her white blood cells were over 20,000 at the time of her surgery but are now down to 13,000.  She is not receiving any chemotherapy and has not been able to afford the Sutent recommended.  Started to develop the same lesions about 6 weeks ago and she does not associate them with trauma or contamination by water or soil or animals etc she has not received any steroids other than perhaps a lumbar spine epidural injection.  She unfortunately to still smokes cigarettes    1/29/20: had a biopsy on 1/21. The cultures that I took had MRSA and she did not start the doxycycline until 1/21 after biopsy and culture at my request. She is tolerating this fairly well. The lesions are a little better.  After visit, I received histopath from Dr. 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 Thus far, only normal preston from his aerobic culture. The histopathology from 8/2016 and 1/2017  bears the same description(also same pathologist).    Review of patient's allergies indicates:   Allergen Reactions    Penicillins Itching and Rash    Promethazine Itching and Rash    Sulfa (sulfonamide antibiotics) Itching and Rash     Past Medical History:   Diagnosis Date    Anxiety     Asthma     childhood currently resolved    Cancer 1984    bladder    Chronic venous stasis     Colon polyps     Deep venous " "thrombosis 1970'S    In the leg    Degenerative disc disease at L5-S1 level     Depression     Hyperlipidemia     Hypertension     Obesity     Overactive bladder     Pyoderma [id number]    No histological diagnosis    Renal cell cancer, right 09/2019    Wears partial dentures     UPPER PARTIAL     Past Surgical History:   Procedure Laterality Date    BLADDER SURGERY  1985    c-spine fusion      EPIDURAL STEROID INJECTION  2019    x2     HYSTERECTOMY  1977    ROBOT-ASSISTED LAPAROSCOPIC NEPHRECTOMY Right 9/18/2019    Procedure: ROBOTIC NEPHRECTOMY;  Surgeon: Manny Mondragon MD;  Location: [facility];  Service: Urology;  Laterality: Right;    SPINE SURGERY  1987      Social History     Tobacco Use    Smoking status: Current Every Day Smoker     Packs/day: 0.75     Types: Cigarettes    Smokeless tobacco: Never Used   Substance Use Topics    Alcohol use: No     Family History   Problem Relation Age of Onset    Cancer Mother     Vision loss Mother     Hypertension Father     Stroke Father        Pertinent medications noted:     Review of Systems    Ability to give history is:  Good  No chills, fever, sweats, unintentional weight loss  No change in vision,    No sinus congestion,   No pain in mouth or throat. No problems with teeth, gums.   No chest pain,   Chronic cough,  no sputum production, still smokes  No nausea, vomiting, diarrhea,  or focal abd pain.  One episode of dyspepsia from the doxy  No swelling of joints, redness of joints, injuries, or new focal pain  No unusual headaches,  neuropathy, falls    anxiety, depression,   No diabetes,   No bleeding, lymphadenopathy, anemia,   Outdoor activities:  Implants:          Objective:      Blood pressure 130/73, pulse 63, temperature 98.3 °F (36.8 °C), temperature source Temporal, height 5' 1" (1.549 m), weight 99.8 kg (220 lb), SpO2 98 %. Body mass index is 41.57 kg/m².  Physical Exam      General: Alert and attentive, cooperative and in no " distress    Eyes:   anicteric, EOMI    Neck: Supple,      ENT:   nares patent, no oral lesions, , no thrush    Cardiovascular:      Respiratory:      Gastrointestinal:       Genitourinary:        Integumentary:             1/15/20: I prepped with chlorhexidine, unroofed the largest 3 of the lesions and was able to express some pus from the largest one only. They are all tender. There are no lesions above her knees. There are hyperpigmented scars from prior venous stasis and prior pyodermatous lesions                                                                                 Right leg            Left leg  1/29: the lesions have noticeably less erythematous. I can still express some drops of purulence from beneath the lesions.      Vascular: No pitting peripheral edema or phlebitis, warm and well perfused    Musculoskeletal: Ambulates without difficulty, no acute arthritis, synovitis or myositis.     Lymphatic:      Neurological: Normal LOC,  speech,   gait    Psychiatric: Normal mood, speech,  demeanor     Wound:see above    VAD:       General Labs reviewed:  Lab Results   Component Value Date    WBC 13.8 (H) 01/08/2020    RBC 4.65 01/08/2020    HGB 12.7 01/08/2020    HCT 39.3 01/08/2020    MCV 84.6 01/08/2020    MCH 27.4 01/08/2020    MCHC 32.4 01/08/2020    RDW 15.6 (H) 01/08/2020     (H) 01/08/2020    MPV 7.9 01/08/2020    GRAN 17.2 (H) 09/19/2019    GRAN 83.0 (H) 09/19/2019    LYMPH 1.5 09/19/2019    LYMPH 7.3 (L) 09/19/2019    MONO 1.8 (H) 09/19/2019    MONO 8.8 09/19/2019    EOS 0.0 09/19/2019    BASO 0.02 09/19/2019    EOSINOPHIL 1.2 01/08/2020    BASOPHIL 0.9 01/08/2020     CMP  Sodium   Date Value Ref Range Status   01/08/2020 138 135 - 145 mEq/L Final     Potassium   Date Value Ref Range Status   01/08/2020 4.3 3.5 - 5.0 mEq/L Final     Chloride   Date Value Ref Range Status   01/08/2020 99 98 - 107 mEq/L Final     CO2   Date Value Ref Range Status   01/08/2020 27 21 - 31 mEq/L Final     Glucose    Date Value Ref Range Status   01/08/2020 91 70 - 100 mg/dL Final     BUN   Date Value Ref Range Status   01/08/2020 14 7 - 21 mg/dL Final     Creatinine   Date Value Ref Range Status   01/08/2020 0.9 0.5 - 1.0 mg/dL Final     Calcium   Date Value Ref Range Status   01/08/2020 9.0 8.5 - 10.3 mg/dL Final     Total Protein   Date Value Ref Range Status   01/08/2020 7.2 6.3 - 8.2 gram/dL Final   07/13/2005 7.9 6.0 - 8.4 gm/dl Final     Albumin   Date Value Ref Range Status   07/13/2005 4.7 3.5 - 5.2 g/dl Final     ALBUMIN   Date Value Ref Range Status   01/08/2020 3.9 3.5 - 5.0 gram/dL Final     Total Bilirubin   Date Value Ref Range Status   01/08/2020 0.3 0.0 - 1.2 mg/dL Final     Comment:     Possible interference observed for Total Bilirubin with Immunoglobulin  G (IgG) with concentration above 28 g/L  (187 micromol/L).       Alkaline Phosphatase   Date Value Ref Range Status   01/08/2020 96 38 - 126 unit/L Final     AST   Date Value Ref Range Status   01/08/2020 16 7 - 40 unit/L Final     ALT   Date Value Ref Range Status   01/08/2020 13 7 - 56 unit/L Final     Anion Gap   Date Value Ref Range Status   01/08/2020 16 9 - 18 mEq/L Final     eGFR if    Date Value Ref Range Status   09/19/2019 >60 >60 mL/min/1.73 m^2 Final     eGFR if non    Date Value Ref Range Status   09/19/2019 >60 >60 mL/min/1.73 m^2 Final     Comment:     Calculation used to obtain the estimated glomerular filtration  rate (eGFR) is the CKD-EPI equation.              Micro: my culture grew MR Staph aureus. Fungal and AFB cultures are negative thus far    Imaging Reviewed:  CT abd/pelvis        Assessment:       Problem List Items Addressed This Visit     Renal cell cancer, right    Chronic venous stasis    Pyoderma - Primary      Other Visit Diagnoses     MRSA (methicillin resistant Staphylococcus aureus)        Encounter for long-term (current) use of antibiotics               Plan:           Pyoderma    MRSA  (methicillin resistant Staphylococcus aureus)    Chronic venous stasis    Renal cell cancer, right    Encounter for long-term (current) use of antibiotics    Other orders  -     doxycycline (VIBRAMYCIN) 100 MG Cap; Take 1 capsule (100 mg total) by mouth 2 (two) times daily.  Dispense: 60 capsule; Refill: 1        Doxycycline 100 mg caps twice a day with food, no dairy or vitamins     Return in 3-4 weeks  If dr. Lopes sends me the results of your biopsy and cultures , I will let you know what they showed.     Please take a probiotic while you are on antibiotics plus a few weeks. Examples of probiotics include brand names such as Align, Floraster and Culturelle, but generic versions are ok too. Kefir is a milk product that is also an excellent probiotic and can be taken as 1/4 cup three times a day with meals.       I would like dr. Nunes to proceed with the blood work/bone marrow evaluation that he intended last summer.       This note was created using Dragon voice recognition software that occasionally misinterpreted phrases or words.

## 2020-01-29 NOTE — PATIENT INSTRUCTIONS
Doxycycline 100 mg caps twice a day with food, no dairy or vitamins     Return in 3-4 weeks  If dr. Lopes sends me the results of your biopsy and cultures , I will let you know what they showed.     Please take a probiotic while you are on antibiotics plus a few weeks. Examples of probiotics include brand names such as Align, Floraster and Culturelle, but generic versions are ok too. Kefir is a milk product that is also an excellent probiotic and can be taken as 1/4 cup three times a day with meals.       I would like dr. Nunes to proceed with the blood work/bone marrow evaluation that he intended last summer.

## 2020-02-11 ENCOUNTER — OFFICE VISIT (OUTPATIENT)
Dept: HEMATOLOGY/ONCOLOGY | Facility: CLINIC | Age: 75
End: 2020-02-11
Payer: MEDICARE

## 2020-02-11 VITALS
WEIGHT: 219.81 LBS | TEMPERATURE: 98 F | DIASTOLIC BLOOD PRESSURE: 77 MMHG | SYSTOLIC BLOOD PRESSURE: 132 MMHG | HEART RATE: 82 BPM | BODY MASS INDEX: 41.53 KG/M2

## 2020-02-11 DIAGNOSIS — A49.02 MRSA INFECTION: ICD-10-CM

## 2020-02-11 DIAGNOSIS — L97.929 ULCERS OF BOTH LOWER LEGS: ICD-10-CM

## 2020-02-11 DIAGNOSIS — L97.919 ULCERS OF BOTH LOWER LEGS: ICD-10-CM

## 2020-02-11 DIAGNOSIS — D72.829 LEUKOCYTOSIS, UNSPECIFIED TYPE: ICD-10-CM

## 2020-02-11 DIAGNOSIS — C64.1 RENAL CELL CANCER, RIGHT: Primary | ICD-10-CM

## 2020-02-11 PROCEDURE — 1101F PT FALLS ASSESS-DOCD LE1/YR: CPT | Mod: S$GLB,,, | Performed by: INTERNAL MEDICINE

## 2020-02-11 PROCEDURE — 1159F PR MEDICATION LIST DOCUMENTED IN MEDICAL RECORD: ICD-10-PCS | Mod: S$GLB,,, | Performed by: INTERNAL MEDICINE

## 2020-02-11 PROCEDURE — 99213 PR OFFICE/OUTPT VISIT, EST, LEVL III, 20-29 MIN: ICD-10-PCS | Mod: S$GLB,,, | Performed by: INTERNAL MEDICINE

## 2020-02-11 PROCEDURE — 3078F PR MOST RECENT DIASTOLIC BLOOD PRESSURE < 80 MM HG: ICD-10-PCS | Mod: S$GLB,,, | Performed by: INTERNAL MEDICINE

## 2020-02-11 PROCEDURE — 3075F PR MOST RECENT SYSTOLIC BLOOD PRESS GE 130-139MM HG: ICD-10-PCS | Mod: S$GLB,,, | Performed by: INTERNAL MEDICINE

## 2020-02-11 PROCEDURE — 99213 OFFICE O/P EST LOW 20 MIN: CPT | Mod: S$GLB,,, | Performed by: INTERNAL MEDICINE

## 2020-02-11 PROCEDURE — 1126F PR PAIN SEVERITY QUANTIFIED, NO PAIN PRESENT: ICD-10-PCS | Mod: S$GLB,,, | Performed by: INTERNAL MEDICINE

## 2020-02-11 PROCEDURE — 3078F DIAST BP <80 MM HG: CPT | Mod: S$GLB,,, | Performed by: INTERNAL MEDICINE

## 2020-02-11 PROCEDURE — 1159F MED LIST DOCD IN RCRD: CPT | Mod: S$GLB,,, | Performed by: INTERNAL MEDICINE

## 2020-02-11 PROCEDURE — 3075F SYST BP GE 130 - 139MM HG: CPT | Mod: S$GLB,,, | Performed by: INTERNAL MEDICINE

## 2020-02-11 PROCEDURE — 1126F AMNT PAIN NOTED NONE PRSNT: CPT | Mod: S$GLB,,, | Performed by: INTERNAL MEDICINE

## 2020-02-11 PROCEDURE — 1101F PR PT FALLS ASSESS DOC 0-1 FALLS W/OUT INJ PAST YR: ICD-10-PCS | Mod: S$GLB,,, | Performed by: INTERNAL MEDICINE

## 2020-02-11 NOTE — PROGRESS NOTES
" [facility] History & Physical    Subjective:      Patient ID:   Chen Levine  74 y.o. female  1945  Alanna Valadez Kovachev, [place]      Chief Complaint:   Renal cancer    HPI:  74 y.o. female with increased WBC chronically, 13,000-20,000.  She has spastic colitis hx with cramps.  HTN, asthma as a child.  Bladder cancer ,Dr. Jose Stahl, treated with surgery.  She has stress incontinence.  DVT hx in her 20's.    B6 was low, started on B 6 po.  Energy 5/10.    White blood cell count is running in the 13,000 range.    S/P neck fusion.  LBP with injections, residual numbness down R anterior Leg.  Partial hysterectomy.  Onset of menses age 13.  M0.    TSH slightly increased at 5.43.  Dr. Valadez will review TSH and make decisions regards need for synthroid or not?    CAT and U/S confirm 5.3 x 4.8 cm mass R kidney.  Per radiologist 80% probability of malignancy.  Referred to Dr. Mondragon of  for evaluation.  Her sister had nephrectomy for renal cancer.    She had  R renal mass surgery 19.     Renal Ca, 4 cm, renal vein involvement,  Margin clear, confined in kidney, grade 1.    She was to begin Sutent 25 mg daily adjuvantly and titrate up as tolerated.  But no financial aid or co pay assistance could be gotten.  She could not afford it, and went with observation.    Clinical Stage 3 dx.  Dr. Mondragon has ordered a follow-up CT scan of chest, abdomen, pelvis in 2020.    C/O 2 month hs of "boils" on legs, similar event 3 years ago.  Saw Dr. Jama, referred to her for eval?  Pyoderma lesions, enterococcus C/S .    She saw Dr. Jama again.  C/S of lesions grew MRSA.  She is on doxycycline b.i.d.  Leg wounds have improved, she is to follow up with ID towards the end of the month.    Mamm 19 NL.    Smoked 1/2 ppd x's 57 years.  She does not want referral to smoking clinic.ETOH no.  Allergy Phenergan, PCN, sulfa.  Job [employer], coffee worker.  [employer] worker.    Mom breast Ca, " macular degeneration.  Dad CVA.  Sister x's 2, LBP.  1 sister renal cancer.  Daughter Breast cancer, epilepsy, heart dx.  Daughter hypothyroid dx., cholesterol, 40# benign ovarian tumor.      ROS:   GEN: normal without any fever, night sweats or weight loss  HEENT: normal with no HA's, sore throat, stiff neck, changes in vision  CV: normal with no CP, SOB, PND, VIDES or orthopnea  PULM: normal with no SOB, cough, hemoptysis, sputum or pleuritic pain  GI: normal with no abdominal pain, nausea, vomiting, constipation, diarrhea, melanotic stools, BRBPR, or hematemesis  : See HPI  BREAST: normal with no mass, discharge, pain  SKIN: normal with no rash, erythema, bruising, or swelling     Past Medical History:   Diagnosis Date    Anxiety     Asthma     childhood currently resolved    Cancer 1984    bladder    Chronic venous stasis     Colon polyps     Deep venous thrombosis 1970'S    In the leg    Degenerative disc disease at L5-S1 level     Depression     Hyperlipidemia     Hypertension     Obesity     Overactive bladder     Pyoderma [id number]    No histological diagnosis    Renal cell cancer, right 09/2019    Wears partial dentures     UPPER PARTIAL     Past Surgical History:   Procedure Laterality Date    BLADDER SURGERY  1985    c-spine fusion      EPIDURAL STEROID INJECTION  2019    x2     HYSTERECTOMY  1977    ROBOT-ASSISTED LAPAROSCOPIC NEPHRECTOMY Right 9/18/2019    Procedure: ROBOTIC NEPHRECTOMY;  Surgeon: Manny Mondragon MD;  Location: [facility];  Service: Urology;  Laterality: Right;    SPINE SURGERY  1987       Review of patient's allergies indicates:   Allergen Reactions    Penicillins Itching and Rash    Promethazine Itching and Rash    Sulfa (sulfonamide antibiotics) Itching and Rash           Current Outpatient Medications:     acetaminophen (TYLENOL) 325 MG tablet, Take 2 tablets (650 mg total) by mouth every 6 (six) hours as needed for Pain., Disp: , Rfl: 0    atorvastatin  (LIPITOR) 20 MG tablet, Take 1 tablet (20 mg total) by mouth Daily., Disp: 90 tablet, Rfl: 1    diltiaZEM (CARDIZEM) 90 MG tablet, Take 1 tablet (90 mg total) by mouth 2 (two) times daily., Disp: 180 tablet, Rfl: 1    docusate sodium (COLACE) 100 MG capsule, Take 1 capsule (100 mg total) by mouth 2 (two) times daily., Disp: , Rfl: 0    doxycycline (VIBRAMYCIN) 100 MG Cap, Take 1 capsule (100 mg total) by mouth 2 (two) times daily., Disp: 20 capsule, Rfl: 0    doxycycline (VIBRAMYCIN) 100 MG Cap, Take 1 capsule (100 mg total) by mouth 2 (two) times daily., Disp: 60 capsule, Rfl: 1    escitalopram oxalate (LEXAPRO) 5 MG Tab, Take 1 tablet (5 mg total) by mouth once daily., Disp: 90 tablet, Rfl: 1    furosemide (LASIX) 40 MG tablet, Take 1 tablet (40 mg total) by mouth 3 (three) times a week., Disp: 90 tablet, Rfl: 1    gabapentin (NEURONTIN) 600 MG tablet, Take 600 mg by mouth 3 (three) times daily., Disp: , Rfl:     levothyroxine (SYNTHROID) 50 MCG tablet, , Disp: , Rfl:     pyridoxine, vitamin B6, (VITAMIN B-6) 100 MG Tab, Take 50 mg by mouth once daily., Disp: , Rfl:     traMADol (ULTRAM) 50 mg tablet, Take 1 tablet (50 mg total) by mouth every 8 (eight) hours as needed (pain not relieved by otc agents)., Disp: 15 tablet, Rfl: 0          Objective:   Vitals:  Blood pressure 132/77, pulse 82, temperature 98.2 °F (36.8 °C), weight 99.7 kg (219 lb 12.8 oz).    Physical Examination:   GEN: no apparent distress, comfortable  HEAD: atraumatic and normocephalic  EYES: no pallor, no icterus  ENT:  no pharyngeal erythema, external ears WNL; no nasal discharge  NECK: no masses, thyroid normal, trachea midline, no LAD/LN's, supple  CV: RRR with no murmur; normal pulse; normal S1 and S2; no pedal edema  CHEST: Normal respiratory effort; CTAB; normal breath sounds; no wheeze or crackles  ABDOM: nontender and nondistended; soft;  no rebound/guarding, incisions healing  MUSC/Skeletal: ROM normal; no crepitus; joints  normal; no deformities   EXTREM: no clubbing, cyanosis, inflammation or swelling  SKIN:  Scabbed over lesions noted at the lower legs, with chronic stasis change noted  : no cvat  NEURO: grossly intact; motor/sensory WNL;  no tremors  PSYCH: normal mood, affect and behavior  LYMPH: normal cervical, supraclavicular, axillary and groin LN's  BREASTS: L & R no palpable mass    Path report as above.    WBC 13,100.  ,000. H/H 13.1/39.5.  Creatinine .88  TSH 4.95, started on Synthroid, Dr. Valadez.    Assessment:   (1) 74 y.o. female with diagnosis of WBC 13,800. Gradually increasing from 8,900 since 2/2017.  Slight increased monocytes.  On repeat determination the WBC 13,800. Now.  B6 was low at 2.7.  Began B6 50 mg daily.  Defer bone marrow exam for now.    (2)Consider leukemoid reaction, myeloproliferative syndrome including CML and CMML.    (3) 2 first degree relatives with Breast Cancer.  Discussed BRCA 1&2 testing.  Her insurance carrier would not authorize BRCA 1 or 2.    (4)TSH increased 5.43, Dr. Valadez to review thyroid status,  He started her on Synthroid.    (5)neuropathy sx at R anterior thigh.  Dr. Valadez aware, and to evaluate later, when pt agrees.    (6)R renal Ca, Stage 3 dx, S/P radical nephrectomy.  Worked on financial aid  for Sutent 25 mg 1 po daily for 4/6 weeks x's 1 year.  Increase dosage as tolerated.  She could not afford sutent, co pay.    Dr. Mondragon has her scheduled for follow up CAT scans.    Lab re eval ordered at [facility] for increased WBC,  Diff Dx as above.    Referred to Dr. Jama, leg lesions, pyoderma and enterococcus C/S hx 2017.  Repeat culture now shows MRSA, current Adama antibiotics with some improvement.    RTC 1 month.

## 2020-02-17 LAB — FUNGUS SPEC CULT: NORMAL

## 2020-02-26 ENCOUNTER — OFFICE VISIT (OUTPATIENT)
Dept: INFECTIOUS DISEASES | Facility: CLINIC | Age: 75
End: 2020-02-26
Payer: MEDICARE

## 2020-02-26 VITALS
SYSTOLIC BLOOD PRESSURE: 120 MMHG | HEART RATE: 65 BPM | DIASTOLIC BLOOD PRESSURE: 91 MMHG | OXYGEN SATURATION: 95 % | WEIGHT: 220 LBS | BODY MASS INDEX: 41.57 KG/M2

## 2020-02-26 DIAGNOSIS — Z79.2 ENCOUNTER FOR LONG-TERM (CURRENT) USE OF ANTIBIOTICS: ICD-10-CM

## 2020-02-26 DIAGNOSIS — I87.8 CHRONIC VENOUS STASIS: ICD-10-CM

## 2020-02-26 DIAGNOSIS — L08.0 PYODERMA: Primary | ICD-10-CM

## 2020-02-26 DIAGNOSIS — A49.02 MRSA (METHICILLIN RESISTANT STAPHYLOCOCCUS AUREUS): ICD-10-CM

## 2020-02-26 PROCEDURE — 1101F PT FALLS ASSESS-DOCD LE1/YR: CPT | Mod: S$GLB,,, | Performed by: INTERNAL MEDICINE

## 2020-02-26 PROCEDURE — 1159F MED LIST DOCD IN RCRD: CPT | Mod: S$GLB,,, | Performed by: INTERNAL MEDICINE

## 2020-02-26 PROCEDURE — 3080F DIAST BP >= 90 MM HG: CPT | Mod: S$GLB,,, | Performed by: INTERNAL MEDICINE

## 2020-02-26 PROCEDURE — 3080F PR MOST RECENT DIASTOLIC BLOOD PRESSURE >= 90 MM HG: ICD-10-PCS | Mod: S$GLB,,, | Performed by: INTERNAL MEDICINE

## 2020-02-26 PROCEDURE — 1126F AMNT PAIN NOTED NONE PRSNT: CPT | Mod: S$GLB,,, | Performed by: INTERNAL MEDICINE

## 2020-02-26 PROCEDURE — 1126F PR PAIN SEVERITY QUANTIFIED, NO PAIN PRESENT: ICD-10-PCS | Mod: S$GLB,,, | Performed by: INTERNAL MEDICINE

## 2020-02-26 PROCEDURE — 3074F PR MOST RECENT SYSTOLIC BLOOD PRESSURE < 130 MM HG: ICD-10-PCS | Mod: S$GLB,,, | Performed by: INTERNAL MEDICINE

## 2020-02-26 PROCEDURE — 1101F PR PT FALLS ASSESS DOC 0-1 FALLS W/OUT INJ PAST YR: ICD-10-PCS | Mod: S$GLB,,, | Performed by: INTERNAL MEDICINE

## 2020-02-26 PROCEDURE — 1159F PR MEDICATION LIST DOCUMENTED IN MEDICAL RECORD: ICD-10-PCS | Mod: S$GLB,,, | Performed by: INTERNAL MEDICINE

## 2020-02-26 PROCEDURE — 99214 PR OFFICE/OUTPT VISIT, EST, LEVL IV, 30-39 MIN: ICD-10-PCS | Mod: S$GLB,,, | Performed by: INTERNAL MEDICINE

## 2020-02-26 PROCEDURE — 99214 OFFICE O/P EST MOD 30 MIN: CPT | Mod: S$GLB,,, | Performed by: INTERNAL MEDICINE

## 2020-02-26 PROCEDURE — 3074F SYST BP LT 130 MM HG: CPT | Mod: S$GLB,,, | Performed by: INTERNAL MEDICINE

## 2020-02-26 RX ORDER — TERBINAFINE HYDROCHLORIDE 250 MG/1
250 TABLET ORAL DAILY
Qty: 30 TABLET | Refills: 2 | Status: SHIPPED | OUTPATIENT
Start: 2020-02-26 | End: 2020-05-26

## 2020-02-26 RX ORDER — FLURAZEPAM HYDROCHLORIDE 15 MG/1
CAPSULE ORAL
COMMUNITY
Start: 2020-02-14

## 2020-02-26 NOTE — PROGRESS NOTES
"Subjective:      Reason for consult:  Skin lesions    HPI: Chen Levine is a 75 y.o. female   Seen in 2017 for multiple lesions of pyoderma of LE with 3 biopsies without diagnosis and treatment with prolonged zyvox (for repeatedly isolated enterococcus) and lamisil empirically by her primary care doctor, finally with resolution  She was referred by Dr. Sam Nunes for recurrence of the same lesions, after diagnosis and resection of right renal cell carcinoma.  This was discovered during a workup for leukocytosis.  Flow cytometry was ordered but then subsequently not done.  Her white blood cells were over 20,000 at the time of her surgery but are now down to 13,000.  She is not receiving any chemotherapy and has not been able to afford the Sutent recommended.  Started to develop the same lesions about 6 weeks ago and she does not associate them with trauma or contamination by water or soil or animals etc she has not received any steroids other than perhaps a lumbar spine epidural injection.  She unfortunately to still smokes cigarettes    1/29/20: had a biopsy on 1/21. The cultures that I took had MRSA and she did not start the doxycycline until 1/21 after biopsy and culture at my request. She is tolerating this fairly well. The lesions are a little better.  After visit, I received histopath from Dr. 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 Thus far, only normal preston from his aerobic culture. The histopathology from 8/2016 and 1/2017  bears the same description(also same pathologist).    2/26/20: tolerating doxycycline, skin lesions have regressed a little.  over right lateral shin . She feels firm nodules that have not "broken out" yet. She continues to smoke. No additional hematologic work up performed. Reviewed prior treatment and she did have some ?response to oral lamisil given by her primary care. Her skin lesions could be consistent with " chromoblastomycosis, but this should be diagnosable on skin biopsy or with culture. The culture for fungi that  I performed is negative. The AFB culture is still incomplete. She is not an outside person and these lesions were present 2-3 years ago and faded after zyvox and terbinafine(had grown enterococcus on many occasions).     Review of patient's allergies indicates:   Allergen Reactions    Penicillins Itching and Rash    Promethazine Itching and Rash    Sulfa (sulfonamide antibiotics) Itching and Rash     Past Medical History:   Diagnosis Date    Anxiety     Asthma     childhood currently resolved    Cancer 1984    bladder    Chronic venous stasis     Colon polyps     Deep venous thrombosis 1970'S    In the leg    Degenerative disc disease at L5-S1 level     Depression     Hyperlipidemia     Hypertension     Obesity     Overactive bladder     Pyoderma [id number]    No histological diagnosis    Renal cell cancer, right 09/2019    Wears partial dentures     UPPER PARTIAL     Past Surgical History:   Procedure Laterality Date    BLADDER SURGERY  1985    c-spine fusion      EPIDURAL STEROID INJECTION  2019    x2     HYSTERECTOMY  1977    ROBOT-ASSISTED LAPAROSCOPIC NEPHRECTOMY Right 9/18/2019    Procedure: ROBOTIC NEPHRECTOMY;  Surgeon: Manny Mondragon MD;  [facility]  Service: Urology;  Laterality: Right;    SPINE SURGERY  1987      Social History     Tobacco Use    Smoking status: Current Every Day Smoker     Packs/day: 0.75     Types: Cigarettes    Smokeless tobacco: Never Used   Substance Use Topics    Alcohol use: No     Family History   Problem Relation Age of Onset    Cancer Mother     Vision loss Mother     Hypertension Father     Stroke Father        Pertinent medications noted:     Review of Systems    Ability to give history is:  Good  No chills, fever, sweats, unintentional weight loss  No change in vision,    No sinus congestion,   No pain in mouth or throat. No  problems with teeth, gums.   No chest pain,   Chronic cough,  no sputum production, still smokes  No nausea, vomiting, diarrhea,  or focal abd pain.     No swelling of joints, redness of joints, injuries, or new focal pain  No unusual headaches,  neuropathy, falls    anxiety, depression,   No diabetes,   No bleeding, lymphadenopathy, anemia,   Outdoor activities:  Implants:          Objective:      Blood pressure (!) 120/91, pulse 65, weight 99.8 kg (220 lb), SpO2 95 %. Body mass index is 41.57 kg/m².  Physical Exam      General: Alert and attentive, cooperative and in no distress    Eyes:   anicteric, EOMI    Neck: Supple,      ENT:   nares patent, no oral lesions, , no thrush    Cardiovascular:      Respiratory:      Gastrointestinal:       Genitourinary:        Integumentary:             1/15/20: I prepped with chlorhexidine, unroofed the largest 3 of the lesions and was able to express some pus from the largest one only. They are all tender. There are no lesions above her knees. There are hyperpigmented scars from prior venous stasis and prior pyodermatous lesions            Right leg        Left leg       Left leg 2/26 1/29: the lesions have noticeably less erythematous. I can still express some drops of purulence from beneath the lesions.   2/26: the exophytic lesions are less erythematous. I debrided the surface of the right lateral leg, but minimal purulence was present. The left lateral leg has a firm nodule.      Vascular: No pitting peripheral edema or phlebitis, warm and well perfused    Musculoskeletal: Ambulates without difficulty, no acute arthritis, synovitis or myositis.     Lymphatic:      Neurological: Normal LOC,  speech,   gait    Psychiatric: Normal mood, speech,  demeanor     Wound:see above    VAD:       General Labs reviewed:  Lab Results   Component Value Date    WBC 13.8 (H) 01/08/2020    RBC 4.65 01/08/2020    HGB 12.7 01/08/2020    HCT 39.3 01/08/2020    MCV 84.6 01/08/2020    MCH 27.4  01/08/2020    MCHC 32.4 01/08/2020    RDW 15.6 (H) 01/08/2020     (H) 01/08/2020    MPV 7.9 01/08/2020    GRAN 17.2 (H) 09/19/2019    GRAN 83.0 (H) 09/19/2019    LYMPH 1.5 09/19/2019    LYMPH 7.3 (L) 09/19/2019    MONO 1.8 (H) 09/19/2019    MONO 8.8 09/19/2019    EOS 0.0 09/19/2019    BASO 0.02 09/19/2019    EOSINOPHIL 1.2 01/08/2020    BASOPHIL 0.9 01/08/2020     CMP  Sodium   Date Value Ref Range Status   01/08/2020 138 135 - 145 mEq/L Final     Potassium   Date Value Ref Range Status   01/08/2020 4.3 3.5 - 5.0 mEq/L Final     Chloride   Date Value Ref Range Status   01/08/2020 99 98 - 107 mEq/L Final     CO2   Date Value Ref Range Status   01/08/2020 27 21 - 31 mEq/L Final     Glucose   Date Value Ref Range Status   01/08/2020 91 70 - 100 mg/dL Final     BUN   Date Value Ref Range Status   01/08/2020 14 7 - 21 mg/dL Final     Creatinine   Date Value Ref Range Status   01/08/2020 0.9 0.5 - 1.0 mg/dL Final     Calcium   Date Value Ref Range Status   01/08/2020 9.0 8.5 - 10.3 mg/dL Final     Total Protein   Date Value Ref Range Status   01/08/2020 7.2 6.3 - 8.2 gram/dL Final   07/13/2005 7.9 6.0 - 8.4 gm/dl Final     Albumin   Date Value Ref Range Status   07/13/2005 4.7 3.5 - 5.2 g/dl Final     ALBUMIN   Date Value Ref Range Status   01/08/2020 3.9 3.5 - 5.0 gram/dL Final     Total Bilirubin   Date Value Ref Range Status   01/08/2020 0.3 0.0 - 1.2 mg/dL Final     Comment:     Possible interference observed for Total Bilirubin with Immunoglobulin  G (IgG) with concentration above 28 g/L  (187 micromol/L).       Alkaline Phosphatase   Date Value Ref Range Status   01/08/2020 96 38 - 126 unit/L Final     AST   Date Value Ref Range Status   01/08/2020 16 7 - 40 unit/L Final     ALT   Date Value Ref Range Status   01/08/2020 13 7 - 56 unit/L Final     Anion Gap   Date Value Ref Range Status   01/08/2020 16 9 - 18 mEq/L Final     eGFR if    Date Value Ref Range Status   09/19/2019 >60 >60  mL/min/1.73 m^2 Final     eGFR if non    Date Value Ref Range Status   09/19/2019 >60 >60 mL/min/1.73 m^2 Final     Comment:     Calculation used to obtain the estimated glomerular filtration  rate (eGFR) is the CKD-EPI equation.              Micro: my culture grew MR Stapcatarino aureus. Fungal and AFB cultures are negative thus far    Imaging Reviewed:  CT abd/pelvis        Assessment:       Problem List Items Addressed This Visit     Chronic venous stasis    Pyoderma - Primary      Other Visit Diagnoses     Encounter for long-term (current) use of antibiotics        Relevant Orders    Comprehensive metabolic panel    MRSA (methicillin resistant Staphylococcus aureus)               Plan:           Pyoderma    Encounter for long-term (current) use of antibiotics  -     Comprehensive metabolic panel; Standing    MRSA (methicillin resistant Staphylococcus aureus)    Chronic venous stasis    Other orders  -     terbinafine HCl (LAMISIL) 250 mg tablet; Take 1 tablet (250 mg total) by mouth once daily.  Dispense: 30 tablet; Refill: 2           Continue doxycycline   We will add terbinafine 250 mg daily to see if the skin spots improve faster    CMP (liver and kidney test) every month while you are on the terbinafine    Return in 4 weeks    I would like dr. Nunes to proceed with the blood work/bone marrow evaluation that he intended last summer.       This note was created using Dragon voice recognition software that occasionally misinterpreted phrases or words.

## 2020-02-26 NOTE — PATIENT INSTRUCTIONS
Continue doxycycline   We will add terbinafine 250 mg daily to see if the skin spots improve faster    CMP (liver and kidney test) every month while you are on the terbinafine    Return in 4 weeks

## 2020-02-28 ENCOUNTER — TELEPHONE (OUTPATIENT)
Dept: INFECTIOUS DISEASES | Facility: CLINIC | Age: 75
End: 2020-02-28

## 2020-02-28 LAB
ACID FAST MOD KINY STN SPEC: NORMAL
MYCOBACTERIUM SPEC QL CULT: NORMAL

## 2020-02-28 NOTE — TELEPHONE ENCOUNTER
----- Message from Bernice Jama MD sent at 2/26/2020  8:53 PM CST -----  Please ask Dr. Lopes's office to fax me a copy of most recent skin biopsy and any cultures

## 2020-03-23 ENCOUNTER — TELEPHONE (OUTPATIENT)
Dept: UROLOGY | Facility: CLINIC | Age: 75
End: 2020-03-23

## 2020-03-23 NOTE — TELEPHONE ENCOUNTER
----- Message from Manny Mondragon MD sent at 3/23/2020  1:55 PM CDT -----  Safe to postpone - routine surveillance. Please r/s into future so does not get lost  ----- Message -----  From: Stefany Deras  Sent: 3/23/2020   1:54 PM CDT  To: Manny Mondragon MD        ----- Message -----  From: Beena Porfirio  Sent: 3/23/2020   1:44 PM CDT  To: Daniel Mera, RT, Natty Glover RN, #    Due to concerns associated with Covid19 the radiology team is seeking to reschedule outpatient diagnostic exams between 3/21  4/21 that have been ordered prior to 3/19.  Pt above is scheduled for CT on 03/31/20 at [facility].  Please respond to this message if you believe it is safe to postpone this exam and the radiology team will reschedule and update you on the patients response.  If you have concerns that postponement is not safe (urgent or time sensitive diagnostic study), then reply and it will be performed as ordered.   If further discussion needed, please provide a contact number and a Radiologist will reach out to you shortly.

## 2020-05-06 ENCOUNTER — OFFICE VISIT (OUTPATIENT)
Dept: INFECTIOUS DISEASES | Facility: CLINIC | Age: 75
End: 2020-05-06
Payer: MEDICARE

## 2020-05-06 ENCOUNTER — LAB VISIT (OUTPATIENT)
Dept: LAB | Facility: HOSPITAL | Age: 75
End: 2020-05-06
Attending: INTERNAL MEDICINE
Payer: MEDICARE

## 2020-05-06 VITALS
OXYGEN SATURATION: 100 % | BODY MASS INDEX: 41.35 KG/M2 | HEART RATE: 67 BPM | TEMPERATURE: 99 F | DIASTOLIC BLOOD PRESSURE: 75 MMHG | WEIGHT: 219 LBS | SYSTOLIC BLOOD PRESSURE: 119 MMHG | HEIGHT: 61 IN

## 2020-05-06 DIAGNOSIS — L08.0 PYODERMA: Primary | ICD-10-CM

## 2020-05-06 DIAGNOSIS — I87.8 CHRONIC VENOUS STASIS: ICD-10-CM

## 2020-05-06 DIAGNOSIS — L08.0 PYODERMA: ICD-10-CM

## 2020-05-06 DIAGNOSIS — Z79.899 ENCOUNTER FOR LONG-TERM (CURRENT) USE OF MEDICATIONS: ICD-10-CM

## 2020-05-06 DIAGNOSIS — D72.829 LEUKOCYTOSIS, UNSPECIFIED TYPE: ICD-10-CM

## 2020-05-06 DIAGNOSIS — C64.1 RENAL CELL CANCER, RIGHT: ICD-10-CM

## 2020-05-06 LAB
ALBUMIN SERPL BCP-MCNC: 3.8 G/DL (ref 3.5–5.2)
ALP SERPL-CCNC: 83 U/L (ref 55–135)
ALT SERPL W/O P-5'-P-CCNC: 12 U/L (ref 10–44)
ANION GAP SERPL CALC-SCNC: 9 MMOL/L (ref 8–16)
AST SERPL-CCNC: 14 U/L (ref 10–40)
BASOPHILS # BLD AUTO: 0.13 K/UL (ref 0–0.2)
BASOPHILS NFR BLD: 0.9 % (ref 0–1.9)
BILIRUB SERPL-MCNC: 0.6 MG/DL (ref 0.1–1)
BUN SERPL-MCNC: 16 MG/DL (ref 8–23)
CALCIUM SERPL-MCNC: 8.6 MG/DL (ref 8.7–10.5)
CHLORIDE SERPL-SCNC: 105 MMOL/L (ref 95–110)
CO2 SERPL-SCNC: 24 MMOL/L (ref 23–29)
CREAT SERPL-MCNC: 1.1 MG/DL (ref 0.5–1.4)
DIFFERENTIAL METHOD: ABNORMAL
EOSINOPHIL # BLD AUTO: 0.2 K/UL (ref 0–0.5)
EOSINOPHIL NFR BLD: 1 % (ref 0–8)
ERYTHROCYTE [DISTWIDTH] IN BLOOD BY AUTOMATED COUNT: 15.9 % (ref 11.5–14.5)
EST. GFR  (AFRICAN AMERICAN): 57.2 ML/MIN/1.73 M^2
EST. GFR  (NON AFRICAN AMERICAN): 49.6 ML/MIN/1.73 M^2
GLUCOSE SERPL-MCNC: 103 MG/DL (ref 70–110)
HCT VFR BLD AUTO: 39.2 % (ref 37–48.5)
HGB BLD-MCNC: 12.9 G/DL (ref 12–16)
IMM GRANULOCYTES # BLD AUTO: 0.11 K/UL (ref 0–0.04)
IMM GRANULOCYTES NFR BLD AUTO: 0.7 % (ref 0–0.5)
LYMPHOCYTES # BLD AUTO: 2.8 K/UL (ref 1–4.8)
LYMPHOCYTES NFR BLD: 18.6 % (ref 18–48)
MCH RBC QN AUTO: 27.7 PG (ref 27–31)
MCHC RBC AUTO-ENTMCNC: 32.9 G/DL (ref 32–36)
MCV RBC AUTO: 84 FL (ref 82–98)
MONOCYTES # BLD AUTO: 1.2 K/UL (ref 0.3–1)
MONOCYTES NFR BLD: 7.8 % (ref 4–15)
NEUTROPHILS # BLD AUTO: 10.9 K/UL (ref 1.8–7.7)
NEUTROPHILS NFR BLD: 71 % (ref 38–73)
NRBC BLD-RTO: 0 /100 WBC
PLATELET # BLD AUTO: 465 K/UL (ref 150–350)
PMV BLD AUTO: 9.7 FL (ref 9.2–12.9)
POTASSIUM SERPL-SCNC: 4 MMOL/L (ref 3.5–5.1)
PROT SERPL-MCNC: 7.9 G/DL (ref 6–8.4)
RBC # BLD AUTO: 4.65 M/UL (ref 4–5.4)
SODIUM SERPL-SCNC: 138 MMOL/L (ref 136–145)
WBC # BLD AUTO: 15.29 K/UL (ref 3.9–12.7)

## 2020-05-06 PROCEDURE — 1125F PR PAIN SEVERITY QUANTIFIED, PAIN PRESENT: ICD-10-PCS | Mod: S$GLB,,, | Performed by: INTERNAL MEDICINE

## 2020-05-06 PROCEDURE — 3078F DIAST BP <80 MM HG: CPT | Mod: S$GLB,,, | Performed by: INTERNAL MEDICINE

## 2020-05-06 PROCEDURE — 1159F MED LIST DOCD IN RCRD: CPT | Mod: S$GLB,,, | Performed by: INTERNAL MEDICINE

## 2020-05-06 PROCEDURE — 1159F PR MEDICATION LIST DOCUMENTED IN MEDICAL RECORD: ICD-10-PCS | Mod: S$GLB,,, | Performed by: INTERNAL MEDICINE

## 2020-05-06 PROCEDURE — 1125F AMNT PAIN NOTED PAIN PRSNT: CPT | Mod: S$GLB,,, | Performed by: INTERNAL MEDICINE

## 2020-05-06 PROCEDURE — 36415 COLL VENOUS BLD VENIPUNCTURE: CPT

## 2020-05-06 PROCEDURE — 3074F PR MOST RECENT SYSTOLIC BLOOD PRESSURE < 130 MM HG: ICD-10-PCS | Mod: S$GLB,,, | Performed by: INTERNAL MEDICINE

## 2020-05-06 PROCEDURE — 1101F PT FALLS ASSESS-DOCD LE1/YR: CPT | Mod: S$GLB,,, | Performed by: INTERNAL MEDICINE

## 2020-05-06 PROCEDURE — 85025 COMPLETE CBC W/AUTO DIFF WBC: CPT

## 2020-05-06 PROCEDURE — 80053 COMPREHEN METABOLIC PANEL: CPT

## 2020-05-06 PROCEDURE — 1101F PR PT FALLS ASSESS DOC 0-1 FALLS W/OUT INJ PAST YR: ICD-10-PCS | Mod: S$GLB,,, | Performed by: INTERNAL MEDICINE

## 2020-05-06 PROCEDURE — 3074F SYST BP LT 130 MM HG: CPT | Mod: S$GLB,,, | Performed by: INTERNAL MEDICINE

## 2020-05-06 PROCEDURE — 99214 OFFICE O/P EST MOD 30 MIN: CPT | Mod: S$GLB,,, | Performed by: INTERNAL MEDICINE

## 2020-05-06 PROCEDURE — 99214 PR OFFICE/OUTPT VISIT, EST, LEVL IV, 30-39 MIN: ICD-10-PCS | Mod: S$GLB,,, | Performed by: INTERNAL MEDICINE

## 2020-05-06 PROCEDURE — 3078F PR MOST RECENT DIASTOLIC BLOOD PRESSURE < 80 MM HG: ICD-10-PCS | Mod: S$GLB,,, | Performed by: INTERNAL MEDICINE

## 2020-05-06 RX ORDER — HYDROCODONE BITARTRATE AND ACETAMINOPHEN 5; 325 MG/1; MG/1
1 TABLET ORAL EVERY 8 HOURS PRN
Qty: 30 TABLET | Refills: 0 | Status: ON HOLD | OUTPATIENT
Start: 2020-05-06 | End: 2020-08-31 | Stop reason: HOSPADM

## 2020-05-06 NOTE — PROGRESS NOTES
"Subjective:      Reason for consult:  Skin lesions    Follow-up     1/15/20: : Chen Levine is a 75 y.o. female   Seen in 2017 for multiple lesions of pyoderma of LE with 3 biopsies without diagnosis and treatment with prolonged zyvox (for repeatedly isolated enterococcus) and lamisil empirically by her primary care doctor, finally with resolution (those visits are scanned in as well as the biopsies and cultures)  She was referred by Dr. Sam Nunes for recurrence of the same lesions, after diagnosis and resection of right renal cell carcinoma.  This was discovered during a workup for leukocytosis.  Flow cytometry was ordered but then subsequently not done.  Her white blood cells were over 20,000 at the time of her surgery but are now down to 13,000.  She is not receiving any chemotherapy and has not been able to afford the Sutent recommended.  Started to develop the same lesions about 6 weeks ago and she does not associate them with trauma or contamination by water or soil or animals etc she has not received any steroids other than perhaps a lumbar spine epidural injection.  She unfortunately to still smokes cigarettes    1/29/20: had a biopsy on 1/21. The cultures that I took had MRSA and she did not start the doxycycline until 1/21 after biopsy and culture at my request. She is tolerating this fairly well. The lesions are a little better.  After visit, I received histopath from Dr. 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 Thus far, only normal preston from his aerobic culture. The histopathology from 8/2016 and 1/2017  bears the same description(also same pathologist).    2/26/20: tolerating doxycycline, skin lesions have regressed a little.  over right lateral shin . She feels firm nodules that have not "broken out" yet. She continues to smoke. No additional hematologic work up performed. Reviewed prior treatment and she did have some ?response " to oral lamisil given by her primary care. 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 The culture for fungi that  I performed is negative. The AFB culture is still incomplete. She is not an outside person and these lesions were present 2-3 years ago and faded after zyvox and terbinafine(had grown enterococcus on many occasions).     5/6: follow up delayed due to COVID 19 pandemic. She stopped the doxycyline when she ran out 3 weeks ago. She noted a flare of erythema and drainage 2 weeks ago, suggesting that the doxy was suppressing something.  She had not seen any improvement on the terbinafine. She did not have lab work as requested. Denies side effects. Her fungal and afb cultures taken by me and from the biopsy are negative(outside lab, in media).  I tried to express some drainage for culture today but was unsuccessful. Her only other complaint is pain at the sites of the lesions. She has still not had the heme workup desired by her oncologist (flow cytometry/bone marrow) for chronic leukocytosis. She has no fever or constitutional complaints.     Review of patient's allergies indicates:   Allergen Reactions    Penicillins Itching and Rash    Promethazine Itching and Rash    Sulfa (sulfonamide antibiotics) Itching and Rash     Past Medical History:   Diagnosis Date    Anxiety     Asthma     childhood currently resolved    Cancer 1984    bladder    Chronic venous stasis     Colon polyps     Deep venous thrombosis 1970'S    In the leg    Degenerative disc disease at L5-S1 level     Depression     Hyperlipidemia     Hypertension     Obesity     Overactive bladder     Pyoderma [id number]    No histological diagnosis    Renal cell cancer, right 09/2019    Wears partial dentures     UPPER PARTIAL     Past Surgical History:   Procedure Laterality Date    BLADDER SURGERY  1985    c-spine fusion      EPIDURAL STEROID  "INJECTION  2019    x2     HYSTERECTOMY  1977    ROBOT-ASSISTED LAPAROSCOPIC NEPHRECTOMY Right 9/18/2019    Procedure: ROBOTIC NEPHRECTOMY;  Surgeon: Manny Mondragon MD;  Location: [facility];  Service: Urology;  Laterality: Right;    SPINE SURGERY  1987      Social History     Tobacco Use    Smoking status: Current Every Day Smoker     Packs/day: 0.75     Types: Cigarettes    Smokeless tobacco: Never Used   Substance Use Topics    Alcohol use: No     Family History   Problem Relation Age of Onset    Cancer Mother     Vision loss Mother     Hypertension Father     Stroke Father        Pertinent medications noted:     Review of Systems    Ability to give history is:  fair  No chills, fever, sweats, unintentional weight loss  No change in vision,    No sinus congestion, or signsof COVID  No pain in mouth or throat.  .   No chest pain,   Chronic cough,  no sputum production, still smokes  No nausea, vomiting, diarrhea,  or focal abd pain.     No swelling of joints, redness of joints, injuries, or new focal pain  No unusual headaches,  neuropathy, falls but she reduced her gabapentin dose because she read it could increase your appetite    anxiety, depression,   No diabetes,   No bleeding, lymphadenopathy, anemia,   Outdoor activities:  Implants:          Objective:      Blood pressure 119/75, pulse 67, temperature 98.9 °F (37.2 °C), height 5' 1" (1.549 m), weight 99.3 kg (219 lb), SpO2 100 %. Body mass index is 41.38 kg/m².  Physical Exam      General: Alert and attentive, cooperative and uncomfortable    Eyes:   anicteric, EOMI    Neck: Supple,      ENT:   nares patent, no oral lesions, , no thrush    Cardiovascular:      Respiratory:      Gastrointestinal:       Genitourinary:        Integumentary:             1/15/20: I prepped with chlorhexidine, unroofed the largest 3 of the lesions and was able to express some pus from the largest one only. They are all tender. There are no lesions above her knees. There " are hyperpigmented scars from prior venous stasis and prior pyodermatous lesions            Right leg        Left leg       Left leg 2/26 1/29: the lesions have noticeably less erythematous. I can still express some drops of purulence from beneath the lesions.   2/26: the exophytic lesions are less erythematous. I debrided the surface of the right lateral leg, but minimal purulence was present. The left lateral leg has a firm nodule.       5/6:       The lesions are more exophytic again with surrounding erythema. (after I palpated them a bit). Mostly on the right leg. No new areas not present previously and the lateral left leg lesion did not evolve       Vascular: No pitting but she does have chronic peripheral edema   warm and well perfused    Musculoskeletal: Ambulates without difficulty, no acute arthritis, synovitis or myositis.     Lymphatic:      Neurological: Normal LOC,  speech,   gait    Psychiatric: Normal mood, speech,  demeanor     Wound:see above    VAD:       General Labs reviewed:  Lab Results   Component Value Date    WBC 13.8 (H) 01/08/2020    RBC 4.65 01/08/2020    HGB 12.7 01/08/2020    HCT 39.3 01/08/2020    MCV 84.6 01/08/2020    MCH 27.4 01/08/2020    MCHC 32.4 01/08/2020    RDW 15.6 (H) 01/08/2020     (H) 01/08/2020    MPV 7.9 01/08/2020    GRAN 17.2 (H) 09/19/2019    GRAN 83.0 (H) 09/19/2019    LYMPH 1.5 09/19/2019    LYMPH 7.3 (L) 09/19/2019    MONO 1.8 (H) 09/19/2019    MONO 8.8 09/19/2019    EOS 0.0 09/19/2019    BASO 0.02 09/19/2019    EOSINOPHIL 1.2 01/08/2020    BASOPHIL 0.9 01/08/2020     CMP  Sodium   Date Value Ref Range Status   01/08/2020 138 135 - 145 mEq/L Final     Potassium   Date Value Ref Range Status   01/08/2020 4.3 3.5 - 5.0 mEq/L Final     Chloride   Date Value Ref Range Status   01/08/2020 99 98 - 107 mEq/L Final     CO2   Date Value Ref Range Status   01/08/2020 27 21 - 31 mEq/L Final     Glucose   Date Value Ref Range Status   01/08/2020 91 70 - 100 mg/dL  Final     BUN   Date Value Ref Range Status   01/08/2020 14 7 - 21 mg/dL Final     Creatinine   Date Value Ref Range Status   01/08/2020 0.9 0.5 - 1.0 mg/dL Final     Calcium   Date Value Ref Range Status   01/08/2020 9.0 8.5 - 10.3 mg/dL Final     Total Protein   Date Value Ref Range Status   01/08/2020 7.2 6.3 - 8.2 gram/dL Final   07/13/2005 7.9 6.0 - 8.4 gm/dl Final     Albumin   Date Value Ref Range Status   07/13/2005 4.7 3.5 - 5.2 g/dl Final     ALBUMIN   Date Value Ref Range Status   01/08/2020 3.9 3.5 - 5.0 gram/dL Final     Total Bilirubin   Date Value Ref Range Status   01/08/2020 0.3 0.0 - 1.2 mg/dL Final     Comment:     Possible interference observed for Total Bilirubin with Immunoglobulin  G (IgG) with concentration above 28 g/L  (187 micromol/L).       Alkaline Phosphatase   Date Value Ref Range Status   01/08/2020 96 38 - 126 unit/L Final     AST   Date Value Ref Range Status   01/08/2020 16 7 - 40 unit/L Final     ALT   Date Value Ref Range Status   01/08/2020 13 7 - 56 unit/L Final     Anion Gap   Date Value Ref Range Status   01/08/2020 16 9 - 18 mEq/L Final     eGFR if    Date Value Ref Range Status   09/19/2019 >60 >60 mL/min/1.73 m^2 Final     eGFR if non    Date Value Ref Range Status   09/19/2019 >60 >60 mL/min/1.73 m^2 Final     Comment:     Calculation used to obtain the estimated glomerular filtration  rate (eGFR) is the CKD-EPI equation.              Micro: 1/2020  my culture grew MR Staph aureus. Fungal and AFB cultures are negative thus far    Imaging Reviewed:  CT abd/pelvis        Assessment:       Problem List Items Addressed This Visit     Renal cell cancer, right    Chronic venous stasis    Pyoderma - Primary    Relevant Orders    Ambulatory referral/consult to Infectious Disease    CBC auto differential    Comprehensive metabolic panel    Flow Cytometry Analysis (Peripheral Blood)      Other Visit Diagnoses     Encounter for long-term (current) use  of medications        Relevant Orders    CBC auto differential    Comprehensive metabolic panel    Leukocytosis, unspecified type        Relevant Orders    Flow Cytometry Analysis (Peripheral Blood)           Plan:           Pyoderma  -     Ambulatory referral/consult to Infectious Disease; Future; Expected date: 05/13/2020  -     CBC auto differential; Future; Expected date: 05/06/2020  -     Comprehensive metabolic panel; Future; Expected date: 05/06/2020  -     Flow Cytometry Analysis (Peripheral Blood); Future; Expected date: 05/06/2020    Encounter for long-term (current) use of medications  -     CBC auto differential; Future; Expected date: 05/06/2020  -     Comprehensive metabolic panel; Future; Expected date: 05/06/2020    Leukocytosis, unspecified type  -     Flow Cytometry Analysis (Peripheral Blood); Future; Expected date: 05/06/2020    Renal cell cancer, right    Chronic venous stasis    Other orders  -     HYDROcodone-acetaminophen (NORCO) 5-325 mg per tablet; Take 1 tablet by mouth every 8 (eight) hours as needed for Pain.  Dispense: 30 tablet; Refill: 0          I do not have a diagnosis for her after multiple biopsies, cultures and empiric treatment with doxy and terbinafine. 2 years ago she had multiple positive cultures with enterococcus and she did respond to zyvox rx by me and terbinafine(rx by her PCP). She may have some superinfection with STaph but I do not believe this is the primary etiology. Unsure if this is related to her renal malignancy, which has been resected, or from a brewing bone marrow process considering her longstanding leukocytosis.     Plan:  Stay off of the doxycycline , and stop the terbinafine    I would like to refer you to Dr. Analisa Leonardo at [facility] [phone]  And the dermatologist of her choice    I would like dr. Nunes to proceed with the blood work/bone marrow evaluation that he intended last summer.    It is ok to apply a topical anesthetic for  the pain.  Please elevate your legs, limit salt, lose weight and get some exercise    Please take another gabapentin at bedtime each night     Please stop smoking    I am ordering CBC CMP and flow cytometry today    This note was created using Dragon voice recognition software that occasionally misinterpreted phrases or words.

## 2020-05-06 NOTE — PATIENT INSTRUCTIONS
Stay off of the doxycycline , and stop the terbinafine    I would like to refer you to Dr. Analisa Leonardo at [facility] [phone]  And the dermatologist of her choice    I would like dr. Nunes to proceed with the blood work/bone marrow evaluation that he intended last summer.    It is ok to apply a topical anesthetic for the pain.  Please elevate your legs, limit salt, lose weight and get some exercise    Please take another gabapentin at bedtime each night     Please stop smoking

## 2020-05-11 ENCOUNTER — TELEPHONE (OUTPATIENT)
Dept: INFECTIOUS DISEASES | Facility: CLINIC | Age: 75
End: 2020-05-11

## 2020-05-11 NOTE — TELEPHONE ENCOUNTER
Patient informed of next available appointment with Dr. Parmar and was provided phone number if she is ok with that appointment date.    MARS Louie

## 2020-05-11 NOTE — TELEPHONE ENCOUNTER
----- Message from Saloni Salmon sent at 5/8/2020 11:37 AM CDT -----  Hello,    Can you please review message below. If appointment date is okay, can you please notify patient?    Thank you,  [facility] Christina  ----- Message -----  From: Erum Raza MA  Sent: 5/6/2020   3:28 PM CDT  To: Saloni Salmon    Good afternoon,   The first available with Dr Lucio will be on 6/4/2020. If the pt needs to be seen sooner, I can reschedule her appointment with another infectious disease provider.   ----- Message -----  From: Saloni Salmon  Sent: 5/6/2020   3:21 PM CDT  To: Jorden Beasley afternoon,    Dr. Jama would like to refer the following patient to Dr. Leonardo in the Infectious Disease department. The patients diagnosis is PyodermaI. I have scanned the patients referral and records into [employer].     If there are any further questions in regards to the patient, please contact Dr. Jama's office at, [phone]. Can you please review and contact patient to schedule appointment.    Thank you,   [facility] Christina

## 2020-05-21 LAB
MISCELLANEOUS TEST NAME: NORMAL
REFERENCE LAB: NORMAL
SPECIMEN TYPE: NORMAL
TEST RESULT: NORMAL

## 2020-06-04 ENCOUNTER — TELEPHONE (OUTPATIENT)
Dept: DERMATOLOGY | Facility: CLINIC | Age: 75
End: 2020-06-04

## 2020-06-04 ENCOUNTER — OFFICE VISIT (OUTPATIENT)
Dept: INFECTIOUS DISEASES | Facility: CLINIC | Age: 75
End: 2020-06-04
Payer: MEDICARE

## 2020-06-04 ENCOUNTER — HOSPITAL ENCOUNTER (OUTPATIENT)
Dept: VASCULAR SURGERY | Facility: CLINIC | Age: 75
Discharge: HOME | End: 2020-06-04
Attending: INTERNAL MEDICINE
Payer: MEDICARE

## 2020-06-04 VITALS
BODY MASS INDEX: 41.02 KG/M2 | WEIGHT: 222.88 LBS | SYSTOLIC BLOOD PRESSURE: 114 MMHG | DIASTOLIC BLOOD PRESSURE: 61 MMHG | TEMPERATURE: 98 F | HEART RATE: 70 BPM | HEIGHT: 62 IN

## 2020-06-04 DIAGNOSIS — I99.8 VASCULAR INSUFFICIENCY: Primary | ICD-10-CM

## 2020-06-04 DIAGNOSIS — R60.9 EDEMA, UNSPECIFIED TYPE: ICD-10-CM

## 2020-06-04 DIAGNOSIS — I87.2 VENOUS INSUFFICIENCY: ICD-10-CM

## 2020-06-04 DIAGNOSIS — L08.0 PYODERMA: ICD-10-CM

## 2020-06-04 DIAGNOSIS — I99.8 VASCULAR INSUFFICIENCY: ICD-10-CM

## 2020-06-04 PROCEDURE — 1101F PT FALLS ASSESS-DOCD LE1/YR: CPT | Mod: CPTII,S$GLB,, | Performed by: INTERNAL MEDICINE

## 2020-06-04 PROCEDURE — 3074F PR MOST RECENT SYSTOLIC BLOOD PRESSURE < 130 MM HG: ICD-10-PCS | Mod: CPTII,S$GLB,, | Performed by: INTERNAL MEDICINE

## 2020-06-04 PROCEDURE — 3008F PR BODY MASS INDEX (BMI) DOCUMENTED: ICD-10-PCS | Mod: CPTII,S$GLB,, | Performed by: INTERNAL MEDICINE

## 2020-06-04 PROCEDURE — 3078F DIAST BP <80 MM HG: CPT | Mod: CPTII,S$GLB,, | Performed by: INTERNAL MEDICINE

## 2020-06-04 PROCEDURE — 1159F PR MEDICATION LIST DOCUMENTED IN MEDICAL RECORD: ICD-10-PCS | Mod: S$GLB,,, | Performed by: INTERNAL MEDICINE

## 2020-06-04 PROCEDURE — 1101F PR PT FALLS ASSESS DOC 0-1 FALLS W/OUT INJ PAST YR: ICD-10-PCS | Mod: CPTII,S$GLB,, | Performed by: INTERNAL MEDICINE

## 2020-06-04 PROCEDURE — 99204 PR OFFICE/OUTPT VISIT, NEW, LEVL IV, 45-59 MIN: ICD-10-PCS | Mod: S$GLB,,, | Performed by: INTERNAL MEDICINE

## 2020-06-04 PROCEDURE — 93970 PR US DUPLEX, UPPER OR LOWER EXT VENOUS,COMPLETE BILAT: ICD-10-PCS | Mod: S$GLB,,, | Performed by: SURGERY

## 2020-06-04 PROCEDURE — 1159F MED LIST DOCD IN RCRD: CPT | Mod: S$GLB,,, | Performed by: INTERNAL MEDICINE

## 2020-06-04 PROCEDURE — 1125F PR PAIN SEVERITY QUANTIFIED, PAIN PRESENT: ICD-10-PCS | Mod: S$GLB,,, | Performed by: INTERNAL MEDICINE

## 2020-06-04 PROCEDURE — 3074F SYST BP LT 130 MM HG: CPT | Mod: CPTII,S$GLB,, | Performed by: INTERNAL MEDICINE

## 2020-06-04 PROCEDURE — 93970 EXTREMITY STUDY: CPT | Mod: S$GLB,,, | Performed by: SURGERY

## 2020-06-04 PROCEDURE — 1125F AMNT PAIN NOTED PAIN PRSNT: CPT | Mod: S$GLB,,, | Performed by: INTERNAL MEDICINE

## 2020-06-04 PROCEDURE — 3008F BODY MASS INDEX DOCD: CPT | Mod: CPTII,S$GLB,, | Performed by: INTERNAL MEDICINE

## 2020-06-04 PROCEDURE — 99204 OFFICE O/P NEW MOD 45 MIN: CPT | Mod: S$GLB,,, | Performed by: INTERNAL MEDICINE

## 2020-06-04 PROCEDURE — 3078F PR MOST RECENT DIASTOLIC BLOOD PRESSURE < 80 MM HG: ICD-10-PCS | Mod: CPTII,S$GLB,, | Performed by: INTERNAL MEDICINE

## 2020-06-04 PROCEDURE — 99999 PR PBB SHADOW E&M-EST. PATIENT-LVL IV: ICD-10-PCS | Mod: PBBFAC,,, | Performed by: INTERNAL MEDICINE

## 2020-06-04 PROCEDURE — 99999 PR PBB SHADOW E&M-EST. PATIENT-LVL IV: CPT | Mod: PBBFAC,,, | Performed by: INTERNAL MEDICINE

## 2020-06-04 NOTE — LETTER
July 1, 2020      Bernice Jama MD  [address]  [address]  [place] [address]           [facility] - Infectious Diseases  [address]  [facility] [address]  Phone: [phone]  Fax: [phone]          Patient: Chen Levine   MR Number: [id number]   YOB: 1945   Date of Visit: 6/4/2020       Dear Dr. Bernice Jama:    Thank you for referring Chen Levine to me for evaluation. Attached you will find relevant portions of my assessment and plan of care.    If you have questions, please do not hesitate to call me. I look forward to following Chen Levine along with you.    Sincerely,    Analisa Leonardo MD    Enclosure  CC:  No Recipients    If you would like to receive this communication electronically, please contact [email or web address] or [phone] to request more information on [employer] Link access.    For providers and/or their staff who would like to refer a patient to [facility], please contact us through our one-stop-shop provider referral line, [facility], at [phone].    If you feel you have received this communication in error or would no longer like to receive these types of communications, please e-mail [email or web address]

## 2020-06-04 NOTE — TELEPHONE ENCOUNTER
----- Message from Michael Dykes LPN sent at 6/4/2020 11:53 AM CDT -----  Pt requesting to be seen for crusty plaque on lower leg. Pt lives in [place]. Please let me know if I can further assist.    Thanks,    AVELINO

## 2020-06-22 ENCOUNTER — OFFICE VISIT (OUTPATIENT)
Dept: DERMATOLOGY | Facility: CLINIC | Age: 75
End: 2020-06-22
Payer: MEDICARE

## 2020-06-22 VITALS — BODY MASS INDEX: 40.77 KG/M2 | HEIGHT: 62 IN

## 2020-06-22 DIAGNOSIS — I87.8 VENOUS STASIS: ICD-10-CM

## 2020-06-22 DIAGNOSIS — I87.2 VENOUS STASIS DERMATITIS, UNSPECIFIED LATERALITY: Primary | ICD-10-CM

## 2020-06-22 DIAGNOSIS — L24.9 IRRITANT DERMATITIS: ICD-10-CM

## 2020-06-22 PROCEDURE — 1101F PT FALLS ASSESS-DOCD LE1/YR: CPT | Mod: CPTII,S$GLB,, | Performed by: DERMATOLOGY

## 2020-06-22 PROCEDURE — 1126F PR PAIN SEVERITY QUANTIFIED, NO PAIN PRESENT: ICD-10-PCS | Mod: S$GLB,,, | Performed by: DERMATOLOGY

## 2020-06-22 PROCEDURE — 1126F AMNT PAIN NOTED NONE PRSNT: CPT | Mod: S$GLB,,, | Performed by: DERMATOLOGY

## 2020-06-22 PROCEDURE — 99999 PR PBB SHADOW E&M-EST. PATIENT-LVL III: CPT | Mod: PBBFAC,,, | Performed by: DERMATOLOGY

## 2020-06-22 PROCEDURE — 99203 OFFICE O/P NEW LOW 30 MIN: CPT | Mod: S$GLB,,, | Performed by: DERMATOLOGY

## 2020-06-22 PROCEDURE — 1159F MED LIST DOCD IN RCRD: CPT | Mod: S$GLB,,, | Performed by: DERMATOLOGY

## 2020-06-22 PROCEDURE — 3008F PR BODY MASS INDEX (BMI) DOCUMENTED: ICD-10-PCS | Mod: CPTII,S$GLB,, | Performed by: DERMATOLOGY

## 2020-06-22 PROCEDURE — 3008F BODY MASS INDEX DOCD: CPT | Mod: CPTII,S$GLB,, | Performed by: DERMATOLOGY

## 2020-06-22 PROCEDURE — 99999 PR PBB SHADOW E&M-EST. PATIENT-LVL III: ICD-10-PCS | Mod: PBBFAC,,, | Performed by: DERMATOLOGY

## 2020-06-22 PROCEDURE — 99203 PR OFFICE/OUTPT VISIT, NEW, LEVL III, 30-44 MIN: ICD-10-PCS | Mod: S$GLB,,, | Performed by: DERMATOLOGY

## 2020-06-22 PROCEDURE — 1159F PR MEDICATION LIST DOCUMENTED IN MEDICAL RECORD: ICD-10-PCS | Mod: S$GLB,,, | Performed by: DERMATOLOGY

## 2020-06-22 PROCEDURE — 1101F PR PT FALLS ASSESS DOC 0-1 FALLS W/OUT INJ PAST YR: ICD-10-PCS | Mod: CPTII,S$GLB,, | Performed by: DERMATOLOGY

## 2020-06-22 RX ORDER — CLOBETASOL PROPIONATE 0.5 MG/G
CREAM TOPICAL 2 TIMES DAILY
Qty: 60 G | Refills: 0 | Status: SHIPPED | OUTPATIENT
Start: 2020-06-22 | End: 2020-07-06 | Stop reason: SDUPTHER

## 2020-06-22 NOTE — PROGRESS NOTES
Subjective:       Patient ID:  Chen Levine is a 75 y.o. female who presents for   Chief Complaint   Patient presents with    Dry Skin     lower legs    Rash     lower legs    Swelling     lower legs     HPI    New patient presents today with a rash/swelling on the lower legs, x 4 years, painful and itchy, has been treated with antibiotics, no topicals.    Review of Systems   Constitutional: Negative for fever, chills and fatigue.   HENT: Negative for congestion, sore throat and mouth sores.    Gastrointestinal: Negative for nausea, vomiting and diarrhea.   Musculoskeletal: Positive for joint swelling and arthralgias.   Skin: Positive for rash. Negative for itching and dry skin.        Objective:    Physical Exam   Constitutional: She appears well-developed and well-nourished. No distress.   Eyes: No conjunctival no injection.   Cardiovascular: There is local extremity swelling and dependent edema.     Neurological: She is alert and oriented to person, place, and time. She is not disoriented.   Psychiatric: She has a normal mood and affect.   Skin:   Areas Examined (abnormalities noted in diagram):   Head / Face Inspection Performed  RUE Inspected  LUE Inspection Performed  RLE Inspected  LLE Inspection Performed  Nails and Digits Inspection Performed              Diagram Legend     Erythematous scaling macule/papule c/w actinic keratosis       Vascular papule c/w angioma      Pigmented verrucoid papule/plaque c/w seborrheic keratosis      Yellow umbilicated papule c/w sebaceous hyperplasia      Irregularly shaped tan macule c/w lentigo     1-2 mm smooth white papules consistent with Milia      Movable subcutaneous cyst with punctum c/w epidermal inclusion cyst      Subcutaneous movable cyst c/w pilar cyst      Firm pink to brown papule c/w dermatofibroma      Pedunculated fleshy papule(s) c/w skin tag(s)      Evenly pigmented macule c/w junctional nevus     Mildly variegated pigmented, slightly  irregular-bordered macule c/w mildly atypical nevus      Flesh colored to evenly pigmented papule c/w intradermal nevus       Pink pearly papule/plaque c/w basal cell carcinoma      Erythematous hyperkeratotic cursted plaque c/w SCC      Surgical scar with no sign of skin cancer recurrence      Open and closed comedones      Inflammatory papules and pustules      Verrucoid papule consistent consistent with wart     Erythematous eczematous patches and plaques     Dystrophic onycholytic nail with subungual debris c/w onychomycosis     Umbilicated papule    Erythematous-base heme-crusted tan verrucoid plaque consistent with inflamed seborrheic keratosis     Erythematous Silvery Scaling Plaque c/w Psoriasis     See annotation      Assessment / Plan:        Venous stasis dermatitis, unspecified laterality  -     Ambulatory referral/consult to Dermatology  -     clobetasoL (TEMOVATE) 0.05 % cream; Apply topically 2 (two) times daily.  Dispense: 60 g; Refill: 0  Discussed with patient the etiology and pathogenesis of the disease or skin lesion(s) and possible treatments and aggravators.    Discussed with patient need to wear compression stockings and to elevate legs regularly, especially with sitting.  Patient to consider elevating legs during sleep if no pain in the toes or numbness.  Patient can consider using ace wrap in lieu of stockings but has to watch out for bluing of the toes or pain or numbness in the toes.  Patient may need evaluation with their primary or cardiologist for leg swelling.  Patient to ambulate regularly and exercise a few times a week if possible.  Patient to consider inversion table or lying down on the floor and propping up legs on a stool or cushions as a daily intervention.    Reviewed with patient different treatment options and associated risks.  Proper application of medications and or care for affected area(s) and condition(s) reviewed.  Chronic nature of this condition discussed with  patient.  Written instructions provided to the patient or guardian today.  1. Avoid hot water    2. Rx cream, use to all spots 2 times daily (am and pm)    3. During the day grease the legs with coconut oil.     4. At home when sitting, elevate the les    5. Also do ace wraps, wrap below the knee to the ankle, slightly tight but not too tight. (try for 4-8 hours daily)    6. Later will try to start compressions stockings. (15-20 size)    Venous stasis  Discussed with patient need to wear compression stockings and to elevate legs regularly, especially with sitting.  Patient to consider elevating legs during sleep if no pain in the toes or numbness.  Patient can consider using ace wrap in lieu of stockings but has to watch out for bluing of the toes or pain or numbness in the toes.  Patient may need evaluation with their primary or cardiologist for leg swelling.  Patient to ambulate regularly and exercise a few times a week if possible.  Patient to consider inversion table or lying down on the floor and propping up legs on a stool or cushions as a daily intervention.    Irritant dermatitis  Told patient to stop all products and make up.  Discussed that less is more right now.  Patient to wash with glycerin bar soap and avoid hot water.  Avoid fragrances.    Discussed with patient to use organic coconut oil or pure shea butter at least daily for moisturization for the body and organic jojoba oil at least daily for the face.           Follow up in about 2 weeks (around 7/6/2020).

## 2020-06-22 NOTE — LETTER
June 22, 2020      Analisa Leonardo MD  [address]  [facility] [address]           [address] [address]  Phone: [phone]          Patient: Chen Levine   MR Number: [id number]   YOB: 1945   Date of Visit: 6/22/2020       Dear Dr. Analisa Leonardo:    Thank you for referring Chen Levine to me for evaluation. Attached you will find relevant portions of my assessment and plan of care.    If you have questions, please do not hesitate to call me. I look forward to following Chen Levine along with you.    Sincerely,    Eduardo Iqbal MD    Enclosure  CC:  No Recipients    If you would like to receive this communication electronically, please contact [email or web address] or [phone] to request more information on [employer] Link access.    For providers and/or their staff who would like to refer a patient to [facility], please contact us through our one-stop-shop provider referral line, [facility], at [phone].    If you feel you have received this communication in error or would no longer like to receive these types of communications, please e-mail [email or web address]

## 2020-06-22 NOTE — PATIENT INSTRUCTIONS
1. Avoid hot water    2. Rx cream, use to all spots 2 times daily (am and pm)    3. During the day grease the legs with coconut oil.     4. At home when sitting, elevate the les    5. Also do ace wraps, wrap below the knee to the ankle, slightly tight but not too tight. (try for 4-8 hours daily)    6. Later will try to start compressions stockings. (15-20 size)        Follow up 2 weeks

## 2020-06-30 ENCOUNTER — HOSPITAL ENCOUNTER (OUTPATIENT)
Dept: RADIOLOGY | Facility: HOSPITAL | Age: 75
Discharge: HOME | End: 2020-06-30
Attending: UROLOGY
Payer: MEDICARE

## 2020-06-30 DIAGNOSIS — C64.1 RENAL CELL CARCINOMA OF RIGHT KIDNEY: ICD-10-CM

## 2020-06-30 PROCEDURE — 25500020 PHARM REV CODE 255

## 2020-06-30 PROCEDURE — 71260 CT THORAX DX C+: CPT | Mod: 26,,, | Performed by: RADIOLOGY

## 2020-06-30 PROCEDURE — 74178 CT ABD&PLV WO CNTR FLWD CNTR: CPT | Mod: TC

## 2020-06-30 PROCEDURE — 71260 CT CHEST ABDOMEN PELVIS W W/O CONTRAST (XPD): ICD-10-PCS | Mod: 26,,, | Performed by: RADIOLOGY

## 2020-06-30 PROCEDURE — 74178 CT CHEST ABDOMEN PELVIS W W/O CONTRAST (XPD): ICD-10-PCS | Mod: 26,,, | Performed by: RADIOLOGY

## 2020-06-30 PROCEDURE — 74178 CT ABD&PLV WO CNTR FLWD CNTR: CPT | Mod: 26,,, | Performed by: RADIOLOGY

## 2020-06-30 RX ADMIN — IOHEXOL 100 ML: 350 INJECTION, SOLUTION INTRAVENOUS at 11:06

## 2020-07-06 ENCOUNTER — OFFICE VISIT (OUTPATIENT)
Dept: DERMATOLOGY | Facility: CLINIC | Age: 75
End: 2020-07-06
Payer: MEDICARE

## 2020-07-06 ENCOUNTER — OFFICE VISIT (OUTPATIENT)
Dept: UROLOGY | Facility: CLINIC | Age: 75
End: 2020-07-06
Payer: MEDICARE

## 2020-07-06 VITALS
SYSTOLIC BLOOD PRESSURE: 107 MMHG | HEIGHT: 62 IN | DIASTOLIC BLOOD PRESSURE: 68 MMHG | WEIGHT: 213.88 LBS | BODY MASS INDEX: 39.36 KG/M2 | TEMPERATURE: 98 F | HEART RATE: 71 BPM

## 2020-07-06 DIAGNOSIS — I87.2 VENOUS STASIS DERMATITIS, UNSPECIFIED LATERALITY: ICD-10-CM

## 2020-07-06 DIAGNOSIS — I87.8 VENOUS STASIS: Primary | ICD-10-CM

## 2020-07-06 DIAGNOSIS — C68.9 MALIGNANT NEOPLASM OF URINARY ORGAN, UNSPECIFIED: ICD-10-CM

## 2020-07-06 DIAGNOSIS — Z90.5 S/P NEPHRECTOMY: ICD-10-CM

## 2020-07-06 DIAGNOSIS — Z85.528 HISTORY OF RENAL CELL CANCER: Primary | ICD-10-CM

## 2020-07-06 DIAGNOSIS — K43.9 VENTRAL HERNIA WITHOUT OBSTRUCTION OR GANGRENE: ICD-10-CM

## 2020-07-06 LAB
BILIRUB SERPL-MCNC: NORMAL MG/DL
BLOOD URINE, POC: NORMAL
CLARITY, POC UA: CLEAR
COLOR, POC UA: YELLOW
GLUCOSE UR QL STRIP: NORMAL
KETONES UR QL STRIP: NORMAL
LEUKOCYTE ESTERASE URINE, POC: NORMAL
NITRITE, POC UA: NORMAL
PH, POC UA: 6
PROTEIN, POC: NORMAL
SPECIFIC GRAVITY, POC UA: 1.02
UROBILINOGEN, POC UA: 0.2

## 2020-07-06 PROCEDURE — 99999 PR PBB SHADOW E&M-EST. PATIENT-LVL II: ICD-10-PCS | Mod: PBBFAC,,, | Performed by: DERMATOLOGY

## 2020-07-06 PROCEDURE — 81002 URINALYSIS NONAUTO W/O SCOPE: CPT | Mod: S$GLB,,, | Performed by: UROLOGY

## 2020-07-06 PROCEDURE — 1159F MED LIST DOCD IN RCRD: CPT | Mod: S$GLB,,, | Performed by: UROLOGY

## 2020-07-06 PROCEDURE — 3074F PR MOST RECENT SYSTOLIC BLOOD PRESSURE < 130 MM HG: ICD-10-PCS | Mod: CPTII,S$GLB,, | Performed by: UROLOGY

## 2020-07-06 PROCEDURE — 1101F PR PT FALLS ASSESS DOC 0-1 FALLS W/OUT INJ PAST YR: ICD-10-PCS | Mod: CPTII,S$GLB,, | Performed by: DERMATOLOGY

## 2020-07-06 PROCEDURE — 1101F PR PT FALLS ASSESS DOC 0-1 FALLS W/OUT INJ PAST YR: ICD-10-PCS | Mod: CPTII,S$GLB,, | Performed by: UROLOGY

## 2020-07-06 PROCEDURE — 1101F PT FALLS ASSESS-DOCD LE1/YR: CPT | Mod: CPTII,S$GLB,, | Performed by: UROLOGY

## 2020-07-06 PROCEDURE — 3078F DIAST BP <80 MM HG: CPT | Mod: CPTII,S$GLB,, | Performed by: UROLOGY

## 2020-07-06 PROCEDURE — 1159F PR MEDICATION LIST DOCUMENTED IN MEDICAL RECORD: ICD-10-PCS | Mod: S$GLB,,, | Performed by: DERMATOLOGY

## 2020-07-06 PROCEDURE — 99213 OFFICE O/P EST LOW 20 MIN: CPT | Mod: S$GLB,,, | Performed by: DERMATOLOGY

## 2020-07-06 PROCEDURE — 81002 POCT URINE DIPSTICK WITHOUT MICROSCOPE: ICD-10-PCS | Mod: S$GLB,,, | Performed by: UROLOGY

## 2020-07-06 PROCEDURE — 99215 PR OFFICE/OUTPT VISIT, EST, LEVL V, 40-54 MIN: ICD-10-PCS | Mod: S$GLB,,, | Performed by: UROLOGY

## 2020-07-06 PROCEDURE — 99213 PR OFFICE/OUTPT VISIT, EST, LEVL III, 20-29 MIN: ICD-10-PCS | Mod: S$GLB,,, | Performed by: DERMATOLOGY

## 2020-07-06 PROCEDURE — 1159F MED LIST DOCD IN RCRD: CPT | Mod: S$GLB,,, | Performed by: DERMATOLOGY

## 2020-07-06 PROCEDURE — 3074F SYST BP LT 130 MM HG: CPT | Mod: CPTII,S$GLB,, | Performed by: UROLOGY

## 2020-07-06 PROCEDURE — 1159F PR MEDICATION LIST DOCUMENTED IN MEDICAL RECORD: ICD-10-PCS | Mod: S$GLB,,, | Performed by: UROLOGY

## 2020-07-06 PROCEDURE — 99999 PR PBB SHADOW E&M-EST. PATIENT-LVL V: CPT | Mod: PBBFAC,,, | Performed by: UROLOGY

## 2020-07-06 PROCEDURE — 3008F BODY MASS INDEX DOCD: CPT | Mod: CPTII,S$GLB,, | Performed by: UROLOGY

## 2020-07-06 PROCEDURE — 99999 PR PBB SHADOW E&M-EST. PATIENT-LVL II: CPT | Mod: PBBFAC,,, | Performed by: DERMATOLOGY

## 2020-07-06 PROCEDURE — 1126F PR PAIN SEVERITY QUANTIFIED, NO PAIN PRESENT: ICD-10-PCS | Mod: S$GLB,,, | Performed by: UROLOGY

## 2020-07-06 PROCEDURE — 3008F PR BODY MASS INDEX (BMI) DOCUMENTED: ICD-10-PCS | Mod: CPTII,S$GLB,, | Performed by: UROLOGY

## 2020-07-06 PROCEDURE — 99999 PR PBB SHADOW E&M-EST. PATIENT-LVL V: ICD-10-PCS | Mod: PBBFAC,,, | Performed by: UROLOGY

## 2020-07-06 PROCEDURE — 1126F AMNT PAIN NOTED NONE PRSNT: CPT | Mod: S$GLB,,, | Performed by: UROLOGY

## 2020-07-06 PROCEDURE — 1101F PT FALLS ASSESS-DOCD LE1/YR: CPT | Mod: CPTII,S$GLB,, | Performed by: DERMATOLOGY

## 2020-07-06 PROCEDURE — 99215 OFFICE O/P EST HI 40 MIN: CPT | Mod: S$GLB,,, | Performed by: UROLOGY

## 2020-07-06 PROCEDURE — 3078F PR MOST RECENT DIASTOLIC BLOOD PRESSURE < 80 MM HG: ICD-10-PCS | Mod: CPTII,S$GLB,, | Performed by: UROLOGY

## 2020-07-06 RX ORDER — CLOBETASOL PROPIONATE 0.5 MG/G
CREAM TOPICAL 2 TIMES DAILY
Qty: 60 G | Refills: 0 | Status: SHIPPED | OUTPATIENT
Start: 2020-07-06 | End: 2020-08-10

## 2020-07-06 NOTE — PROGRESS NOTES
Subjective:       Patient ID:  Chen Levine is a 75 y.o. female who presents for   Chief Complaint   Patient presents with    Dermatitis     HPI  Last OV 06/20/2020  Venous stasis dermatitis, unspecified laterality  -     Ambulatory referral/consult to Dermatology  -     clobetasoL (TEMOVATE) 0.05 % cream; Apply topically 2 (two) times daily.  Dispense: 60 g; Refill: 0  Discussed with patient the etiology and pathogenesis of the disease or skin lesion(s) and possible treatments and aggravators.    Discussed with patient need to wear compression stockings and to elevate legs regularly, especially with sitting.  Patient to consider elevating legs during sleep if no pain in the toes or numbness.  Patient can consider using ace wrap in lieu of stockings but has to watch out for bluing of the toes or pain or numbness in the toes.  Patient may need evaluation with their primary or cardiologist for leg swelling.  Patient to ambulate regularly and exercise a few times a week if possible.  Patient to consider inversion table or lying down on the floor and propping up legs on a stool or cushions as a daily intervention.    Patient presents today for follow up. Patient using Rx cream twice daily and coconut oil daily with leg elevation. Shows improvements.    Review of Systems   Constitutional: Negative for fever, chills and fatigue.   HENT: Negative for congestion, sore throat and mouth sores.    Respiratory: Negative for cough and shortness of breath.    Gastrointestinal: Negative for nausea, vomiting and diarrhea.   Musculoskeletal: Positive for joint swelling and arthralgias.   Skin: Positive for rash. Negative for itching and dry skin.        Objective:    Physical Exam   Constitutional: She appears well-developed and well-nourished. No distress.   Eyes: No conjunctival no injection.   Cardiovascular: There is dependent edema.     Neurological: She is alert and oriented to person, place, and time. She is not  disoriented.   Psychiatric: She has a normal mood and affect.   Skin:   Areas Examined (abnormalities noted in diagram):   RLE Inspected  LLE Inspection Performed              Diagram Legend     Erythematous scaling macule/papule c/w actinic keratosis       Vascular papule c/w angioma      Pigmented verrucoid papule/plaque c/w seborrheic keratosis      Yellow umbilicated papule c/w sebaceous hyperplasia      Irregularly shaped tan macule c/w lentigo     1-2 mm smooth white papules consistent with Milia      Movable subcutaneous cyst with punctum c/w epidermal inclusion cyst      Subcutaneous movable cyst c/w pilar cyst      Firm pink to brown papule c/w dermatofibroma      Pedunculated fleshy papule(s) c/w skin tag(s)      Evenly pigmented macule c/w junctional nevus     Mildly variegated pigmented, slightly irregular-bordered macule c/w mildly atypical nevus      Flesh colored to evenly pigmented papule c/w intradermal nevus       Pink pearly papule/plaque c/w basal cell carcinoma      Erythematous hyperkeratotic cursted plaque c/w SCC      Surgical scar with no sign of skin cancer recurrence      Open and closed comedones      Inflammatory papules and pustules      Verrucoid papule consistent consistent with wart     Erythematous eczematous patches and plaques     Dystrophic onycholytic nail with subungual debris c/w onychomycosis     Umbilicated papule    Erythematous-base heme-crusted tan verrucoid plaque consistent with inflamed seborrheic keratosis     Erythematous Silvery Scaling Plaque c/w Psoriasis     See annotation      Assessment / Plan:        Venous stasis  Discussed with patient need to wear compression stockings and to elevate legs regularly, especially with sitting.  Patient to consider elevating legs during sleep if no pain in the toes or numbness.  Patient can consider using ace wrap in lieu of stockings but has to watch out for bluing of the toes or pain or numbness in the toes.  Patient may need  evaluation with their primary or cardiologist for leg swelling.  Patient to ambulate regularly and exercise a few times a week if possible.  Patient to consider inversion table or lying down on the floor and propping up legs on a stool or cushions as a daily intervention.  Condition is stable.  We will continue present management.    Venous stasis dermatitis, unspecified laterality  -     clobetasoL (TEMOVATE) 0.05 % cream; Apply topically 2 (two) times daily.  Dispense: 60 g; Refill: 0  Better.  Reviewed with patient different treatment options and associated risks.  Focus clob on thick areas and rash/itch prn.  Discussed getting copies of previous biopsy results and or old records.  Consider repeat bx if equivocal findings from prev bx's.  Infection unlikely due to improvement and verrucoid hyperplasia secondary to stasis.  Discussed with patient the etiology and pathogenesis of the disease or skin lesion(s) and possible treatments and aggravators.    Discussed with patient need to wear compression stockings and to elevate legs regularly, especially with sitting.  Patient to consider elevating legs during sleep if no pain in the toes or numbness.  Patient can consider using ace wrap in lieu of stockings but has to watch out for bluing of the toes or pain or numbness in the toes.  Patient may need evaluation with their primary or cardiologist for leg swelling.  Patient to ambulate regularly and exercise a few times a week if possible.  Patient to consider inversion table or lying down on the floor and propping up legs on a stool or cushions as a daily intervention.             Follow up in about 3 weeks (around 7/27/2020).

## 2020-07-06 NOTE — PROGRESS NOTES
"[facility] Urology Progress Note     Chen Levine is a 75 y.o. female who presents for RCC follow up     Referred last summer by Dr Sam Nunes for evaluation of right renal mass found on imaging workup for persistent leukocytosis.    Hx spastic colitis hx with cramps. HTN, asthma as a child. Bladder cancer 1984,Dr. Stahl, treated with surgery. DVT hx in her 20's. Partial hysterectomy. Neck fusion, chronic LBP  Smoked 1/2 ppd x's 57 years. Allergy Phenergan, PCN, sulfa. Job [employer] worker. [employer] worker.  Mom breast Ca, Dad CVA. Sister renal cancer. Daughter Breast cancer, epilepsy, heart dx.  Past cardiology eval: Dr Fuentes - about 1.5 years ago - worried about circulation for LE circulation from ulcers/infection. Reports told her heart was good.  Takes lasix for lower extremity edema.  Is not taking oxybutynin - hasnt since initial rx in 2017 as wasn't helping  Denies urgency/frequency during the day. At night when sleeping doesn't get urge to urinate, and when sits up to go may leak. Not every night. No ELIANA during the day.  Denies all LUTS or incontinence during the day. Only during sleep, may leak with sitting up - this will not happen during day  No blood in urine since diagnosis of bladder cancer in the 1980s.  This was resected and she had multiple negative cystoscopes for years until she was told further surveillance was not needed.  Has colitis, had ulcers removed endoscopically with Dr Barnes. "attacks" less frequent now 2x/yr vs monthly.    She was found to have 5.3cm endophytic central right renal mass on imaging workup for chronic leukocytosis. After extensive discussion of management options, she elected to proceed with robotic assisted laparoscopic radical nephrectomy.  9/18/19: Robot assisted lap radical nephrectomy  PATH: Clear cell renal cell carcinoma, histologic grade 2, with focal invasion of a segmental branch of the renal vein. The vascular, ureteral and Gerota's " "fascial margins are free of tumor.  - Focal chronic pyelonephritis.- Mild chronic ureteritis.  [id number]    Did well postop and DCed home POD 1. Renal function stable postop with Cr 0.9  Had established nephrologic care with Dr Barry preop given comorbidities with anticipated solitary kidney  - saw him postop on 10/15/19 noting: 10/9 - cr 0.88, k 4.1, na 140 - Stage 2 ckd non proteinuric. Focus on bp control. Fu 6 weeks or prn  10/1/19: feels well, "terrific" even. Good energy. Normal bowels. Increased water intake. Drinking water day and night and up til bedtime  Having some increased stress incontinence, leaks slightly with moving. 1/2ppd ELIANA, slightly increased from baseline. 1 nocturnal accident    She returns today noting  In interim has had ID f/u for LE pyoderma from venous stasis ulcers.  Enterococcus in the past, most recently MRSA.  Has been following up with both Dr. Jama for ID and Dr Nunes for hemonc. Now Dr Iqbal for dermatology  Still pending flow cytometry and other workup of her chronic leukocytosis.  Discussed with Oncology postoperatively from nephrectomy re: observation versus adjuvant treatment given T3 status.   Discussed Sutent 25 mg 1 po daily for 4/6 weeks x's 1 year.  Increase dosage as tolerated. - has not been able to start/obtain/afford  6/30 cr 0.9, eGFR >60. Udip today negative.  Continued to follow up with Dr Barry - had phone appt last week - doing well, fu 6 mos  No hematuria/dysuria  CT chest/abdomen/pelvis initial surveillance deferred 2/2 covid but now completed on 6/30/20  - no signs of recurrence, however concern for ventral/incisional hernia, and some pulm/adnexal findings    Chest; macrocalcifications in the inferior aspect of the right lobe of the thyroid gland/right side of the isthmus.  No significant hilar or mediastinal adenopathy.  No pleural or pericardial effusion. Small hiatal hernia. 4 there are few small scattered nodules for example right apex axial " series 305 image 69 3 mm and series 82 3 mm.  There is mild atelectatic stranding and dependent hypoventilatory change including along fissures.  There is 1.8 cm ground-glass opacification right upper lobe for example axial series 305, image 135 this could be infectious/inflammatory and recommend correlation clinically and consider short interval follow-up in 3 months to determine if this resolves.  Abdomen and pelvis; very slight diffuse fatty infiltration suggested in the liver.  No masses.  Spleen not enlarged no calcified stones the gallbladder or CT findings of acute cholecystitis.  Adrenal glands and pancreas unremarkable appearance.  The abdominal aorta tapers without aneurysmal dilatation.  Small hiatal hernia.  Diverticulosis without CT findings of acute diverticulitis.  Appendix not identified with certainty but no abnormal appendix inflammatory changes appendiceal region is seen.   There is ventral hernia right lower quadrant of the abdomen at the level of the umbilicus containing fat and small segment of bowel, the neck of the hernia measuring approximately 2.5 cm in the hernia measuring 5 cm with no bowel obstructive or inflammatory change.  There is very tiny fat containing umbilical hernia.  Prior hysterectomy.  13 mm right ovarian cyst.  Cyst typically not seen in the ovaries with the patient's age, and 9 mm cyst seen in in the right ovary on prior exam.  Suggest follow-up ultrasound. Adnexal region unremarkable appearance.  Kidneys, ureters, bladder; interval right nephrectomy.  Unremarkable appearance of the left kidney.  Urinary bladder mildly distended at time of the exam and unremarkable appearance  No significant adenopathy.  Osseous structures show degenerative changes.  Sclerosis of the body of the symphysis pubis.  Dense 4 cm sclerotic lesion of the left iliac crest and with defect of the cortex with findings old in appearance.  The findings not significantly changed compared to the prior  "exam    Of note intraoperative measures taken given her obese body habitus and poor fascia:  Closed peritoneum first as separate layer before fascial closure, Did 2 running sutures from each apex to meet in middle to close fascia. Noted fascia was poor in some areas and took effort to clear subcutaneous fat to visualize, and included muscle.    She denies change in bowel habits or abdominal/RLQ pain      ROS: A comprehensive 10 system review was performed and is negative except as noted above in HPI    PHYSICAL EXAM:    Vitals:    07/06/20 1124   BP: 107/68   Pulse: 71   Temp: 98.3 °F (36.8 °C)     Body mass index is 39.11 kg/m². Weight: 97 kg (213 lb 13.5 oz) Height: 5' 2" (157.5 cm)       General: Alert, cooperative, no distress, appears stated age   Head: Normocephalic, without obvious abnormality, atraumatic   Eyes: PERRL, conjunctiva/corneas clear   Lungs: Respirations unlabored   Heart: Warm and well perfused   Abdomen: overall soft NT ND obese 1-2+ pannus. Extraction site incision is actually more RLQ and more transverse than vertical. At inferior apex of incision is palpable hernia defect and mild bulge with valsalva and some TTP overlying this area of hernia. Otherwise incisions well healed and all lap sites well healed.  Extremities: BLE with ulcerations and erythema of both LEs   Skin: Skin color, texture, turgor normal, no rashes or lesions   Psych: Appropriate   Neurologic: Non-focal       Recent Results (from the past 336 hour(s))   Comprehensive metabolic panel    Collection Time: 06/30/20  9:57 AM   Result Value Ref Range    Sodium 139 136 - 145 mmol/L    Potassium 4.0 3.5 - 5.1 mmol/L    Chloride 105 95 - 110 mmol/L    CO2 22 (L) 23 - 29 mmol/L    Glucose 100 70 - 110 mg/dL    BUN, Bld 14 8 - 23 mg/dL    Creatinine 0.9 0.5 - 1.4 mg/dL    Calcium 8.7 8.7 - 10.5 mg/dL    Total Protein 7.9 6.0 - 8.4 g/dL    Albumin 3.4 (L) 3.5 - 5.2 g/dL    Total Bilirubin 0.3 0.1 - 1.0 mg/dL    Alkaline Phosphatase " 110 55 - 135 U/L    AST 10 10 - 40 U/L    ALT 10 10 - 44 U/L    Anion Gap 12 8 - 16 mmol/L    eGFR if African American >60 >60 mL/min/1.73 m^2    eGFR if non African American >60 >60 mL/min/1.73 m^2   POCT URINE DIPSTICK WITHOUT MICROSCOPE    Collection Time: 07/06/20 11:37 AM   Result Value Ref Range    Color, UA Yellow     Spec Grav UA 1.020     pH, UA 6     WBC, UA neg     Nitrite, UA neg     Protein neg     Glucose, UA neg     Ketones, UA neg     Urobilinogen, UA 0.2     Bilirubin neg     Blood, UA neg     Clarity, UA Clear        ASSESSMENT   1. History of renal cell cancer  POCT URINE DIPSTICK WITHOUT MICROSCOPE    Creatinine, serum   2. S/p nephrectomy     3. Solitary left kidney     4. Ventral hernia without obstruction or gangrene  Ambulatory referral/consult to General Surgery   5. Malignant neoplasm of urinary organ, unspecified  CT Chest Abdomen Pelvis With Contrast       Plan    Overall still doing quite well s/p right radical nephrectomy. Cental clear cell RCC limited to kidney with focal segmental vein invasion, making it pT3. Resected with negative margins.  Reviewed screening recommendations as below for stage 3 RCC as noted below. Had discussed adjuvant therapy with oncology and considered sutent but cant afford, and believe continued surveillance appropriate as per protocol below but will defer further medical management conversation to oncology, driss in setting of other heme/onc processes (and needs to fu with H/O to complete flow cytometry etc). No evidence of recurrence, though will CC oncology to review pulm findings on CT and plan for next screening CT in 6 mos before follow up  Her renal function is excellent/normal in setting of solitary kidney and will continue routine nephrologic follow up with Dr Barry as scheduled    She does however have incisional ventral hernia at specimen extraction site incision, which given body habitus and positioning during nephrectomy, unable to extract  midline and additional intraop maneuvers taken as noted during incision/fascial closure.  She has not noticed any bulging or gross herniation likely due to overlying abdominal/pannicular fat, though does have significant hernia defect on imaging with bowel within AND is tender to palpation in this area on exam.  We did discuss the necessity of evaluation by general surgery to discuss ventral herniorrhaphy to prevent bowel complications and incarceration given the significance of this hernia (neck of the hernia measuring approximately 2.5 cm in the hernia measuring 5 cm, contains fat and small segment of bowel). We discussed this can be done via open or lap/robotic approach and will place general surgery consult and CC our general surgery team to review and evaluate    Otherwise RTC 6 mos with imaging prior as noted above, and continue interim fu with oncology, nephrology, and ID/derm. Schedule interim eval/management of ventral hernia with gen surg (consult placed, providers CCed)  40 mins spent in encounter, over half in counseling. All questions patient and family member had were answered and are agreeable to treatment plan     Stage 3 kidney cancer (pT3 RCC)  Baseline CT or MRI abdomen within 3-6 months, and then Q 3-6 mos x3 yrs and annually until 5y.   Baseline CT chest within 3-6 months, and then CXR or CT chest Q 3-6 mos x3 yrs and annually until 5y.   CMP Q6 mos x 2 year then annually to 5y

## 2020-07-13 ENCOUNTER — TELEPHONE (OUTPATIENT)
Dept: UROLOGY | Facility: CLINIC | Age: 75
End: 2020-07-13

## 2020-07-14 NOTE — TELEPHONE ENCOUNTER
Please notify patient I just placed general surgery referral to evaluate hernia, and did message general surgeons to review - so their office should be reaching out soon to schedule

## 2020-07-26 NOTE — TELEPHONE ENCOUNTER
Case details sent to Ella Todd, and Olimpia for review.    Referral/consult order placed on 7/13    I can attest at least Jo-Ann Nunes and Olimpia have reviewed case and noted can be scheduled from referral    CCed these provider staffpools to reach out and schedule patient for evaluation of ventral/incisional hernia

## 2020-07-27 ENCOUNTER — TELEPHONE (OUTPATIENT)
Dept: SURGERY | Facility: CLINIC | Age: 75
End: 2020-07-27

## 2020-07-27 ENCOUNTER — OFFICE VISIT (OUTPATIENT)
Dept: DERMATOLOGY | Facility: CLINIC | Age: 75
End: 2020-07-27
Payer: MEDICARE

## 2020-07-27 DIAGNOSIS — I87.8 VENOUS STASIS: Primary | ICD-10-CM

## 2020-07-27 DIAGNOSIS — E11.620 NLD (NECROBIOSIS LIPOIDICA DIABETICORUM): ICD-10-CM

## 2020-07-27 DIAGNOSIS — I87.2 VENOUS STASIS DERMATITIS, UNSPECIFIED LATERALITY: ICD-10-CM

## 2020-07-27 PROCEDURE — 1159F MED LIST DOCD IN RCRD: CPT | Mod: S$GLB,,, | Performed by: DERMATOLOGY

## 2020-07-27 PROCEDURE — 99999 PR PBB SHADOW E&M-EST. PATIENT-LVL III: ICD-10-PCS | Mod: PBBFAC,,, | Performed by: DERMATOLOGY

## 2020-07-27 PROCEDURE — 99213 OFFICE O/P EST LOW 20 MIN: CPT | Mod: S$GLB,,, | Performed by: DERMATOLOGY

## 2020-07-27 PROCEDURE — 1101F PT FALLS ASSESS-DOCD LE1/YR: CPT | Mod: CPTII,S$GLB,, | Performed by: DERMATOLOGY

## 2020-07-27 PROCEDURE — 99999 PR PBB SHADOW E&M-EST. PATIENT-LVL III: CPT | Mod: PBBFAC,,, | Performed by: DERMATOLOGY

## 2020-07-27 PROCEDURE — 1101F PR PT FALLS ASSESS DOC 0-1 FALLS W/OUT INJ PAST YR: ICD-10-PCS | Mod: CPTII,S$GLB,, | Performed by: DERMATOLOGY

## 2020-07-27 PROCEDURE — 1159F PR MEDICATION LIST DOCUMENTED IN MEDICAL RECORD: ICD-10-PCS | Mod: S$GLB,,, | Performed by: DERMATOLOGY

## 2020-07-27 PROCEDURE — 99213 PR OFFICE/OUTPT VISIT, EST, LEVL III, 20-29 MIN: ICD-10-PCS | Mod: S$GLB,,, | Performed by: DERMATOLOGY

## 2020-07-27 PROCEDURE — 1126F PR PAIN SEVERITY QUANTIFIED, NO PAIN PRESENT: ICD-10-PCS | Mod: S$GLB,,, | Performed by: DERMATOLOGY

## 2020-07-27 PROCEDURE — 1126F AMNT PAIN NOTED NONE PRSNT: CPT | Mod: S$GLB,,, | Performed by: DERMATOLOGY

## 2020-07-27 NOTE — PROGRESS NOTES
Subjective:       Patient ID:  Chen Levine is a 75 y.o. female who presents for   Chief Complaint   Patient presents with    Rash     Pt presents today for rash, burning sensation,  Tx. Clobetasol, Coconut oil     HPI    Review of Systems   Constitutional: Negative for fever.   HENT: Negative for sore throat.    Respiratory: Negative for cough and shortness of breath.    Musculoskeletal: Negative for arthralgias.   Skin: Positive for rash. Negative for daily sunscreen use and activity-related sunscreen use.   Hematologic/Lymphatic: Bruises/bleeds easily.        Objective:    Physical Exam   Constitutional: She appears well-developed and well-nourished. No distress.   Eyes: No conjunctival no injection.   Cardiovascular: There is dependent edema.     Neurological: She is alert and oriented to person, place, and time. She is not disoriented.   Psychiatric: She has a normal mood and affect.   Skin:   Areas Examined (abnormalities noted in diagram):   RLE Inspected  LLE Inspection Performed              Diagram Legend     Erythematous scaling macule/papule c/w actinic keratosis       Vascular papule c/w angioma      Pigmented verrucoid papule/plaque c/w seborrheic keratosis      Yellow umbilicated papule c/w sebaceous hyperplasia      Irregularly shaped tan macule c/w lentigo     1-2 mm smooth white papules consistent with Milia      Movable subcutaneous cyst with punctum c/w epidermal inclusion cyst      Subcutaneous movable cyst c/w pilar cyst      Firm pink to brown papule c/w dermatofibroma      Pedunculated fleshy papule(s) c/w skin tag(s)      Evenly pigmented macule c/w junctional nevus     Mildly variegated pigmented, slightly irregular-bordered macule c/w mildly atypical nevus      Flesh colored to evenly pigmented papule c/w intradermal nevus       Pink pearly papule/plaque c/w basal cell carcinoma      Erythematous hyperkeratotic cursted plaque c/w SCC      Surgical scar with no sign of skin  cancer recurrence      Open and closed comedones      Inflammatory papules and pustules      Verrucoid papule consistent consistent with wart     Erythematous eczematous patches and plaques     Dystrophic onycholytic nail with subungual debris c/w onychomycosis     Umbilicated papule    Erythematous-base heme-crusted tan verrucoid plaque consistent with inflamed seborrheic keratosis     Erythematous Silvery Scaling Plaque c/w Psoriasis     See annotation      Assessment / Plan:        Venous stasis  Discussed with patient need to wear compression stockings and to elevate legs regularly, especially with sitting.  Patient to consider elevating legs during sleep if no pain in the toes or numbness.  Patient can consider using ace wrap in lieu of stockings but has to watch out for bluing of the toes or pain or numbness in the toes.  Patient may need evaluation with their primary or cardiologist for leg swelling.  Patient to ambulate regularly and exercise a few times a week if possible.  Patient to consider inversion table or lying down on the floor and propping up legs on a stool or cushions as a daily intervention.  Pt to use ace wraps regularly!    Venous stasis dermatitis, unspecified laterality  Condition is stable.  We will continue present management.  Discussed getting copies of previous biopsy results and or old records.  Did not receive any path reports from [facility].  Will reach out again to review past bx results.  Cont pt's clob bid for now to red areas.    NLD (necrobiosis lipoidica diabeticorum)  Atypical case possible.  Discussed getting copies of previous biopsy results and or old records.             Follow up if symptoms worsen or fail to improve.

## 2020-07-27 NOTE — PROGRESS NOTES
"Subjective:      Patient ID: Chen Levine is a 75 y.o. female.    Chief Complaint:Lower extremity ulcerations      History of Present Illness    This patient presents with a complicated history of lower extremity ulcerations.  The following is from Dr. Jama's notes:  Initially seen in 2017 for multiple lesions presumed to be pyoderma; biopsies x 3 without diagnosis - treatment with prolonged zyvox as Enterococcus was repeatedly isolated and lamisil.  These lesions resolved.  Then referred to Dr. Nunes for recurrence of same lesions; this was after dx right renal cell ca.    She now has the same/similar lesions again.  1/29/20: biopsy: "atypical pseudoepitheliomatous hyperplasia, with negative AFB, PAS, Giemsa, GMS. Thus far, only normal preston from his aerobic culture. The histopathology from 8/2016 and 1/2017  bears the same description(also same pathologist). Culture with MRSA and given doxycycline.    2/26/20: tolerating doxycycline, skin lesions have regressed a little per note.  5/6: follow up off doxy x 3 weeks with recurrent drainage.    Lesions are painful.    ROS   Pain on extremities due to lesions.   Objective:   Physical Exam  Vitals signs reviewed.   Pulmonary:      Effort: Pulmonary effort is normal.   Neurological:      Mental Status: She is alert.           Assessment:       1. Vascular insufficiency    2. Pyoderma    3. Edema, unspecified type           Plan:       1. I suspect that these lesions are not infectious in etiology.  2. Suggested for patient to be seen here by dermatologist but they wish to be seen closer to home.  Spoke with dermatology and shared the above picture and she agrees that this could be a dermatological issue although can't rule out other causes just by seeing the picture.   Apt in [place] and return to us after.     "

## 2020-07-30 ENCOUNTER — TELEPHONE (OUTPATIENT)
Dept: DERMATOLOGY | Facility: CLINIC | Age: 75
End: 2020-07-30

## 2020-07-30 ENCOUNTER — DOCUMENTATION ONLY (OUTPATIENT)
Dept: DERMATOLOGY | Facility: CLINIC | Age: 75
End: 2020-07-30

## 2020-07-30 NOTE — TELEPHONE ENCOUNTER
----- Message from Eduardo Iqbal MD sent at 7/30/2020  3:41 PM CDT -----  Old path rev'd.  Set appt up for cryo test of inflamed area.

## 2020-07-30 NOTE — TELEPHONE ENCOUNTER
----- Message from Salma Woody sent at 7/30/2020  2:45 PM CDT -----  Regarding: Pt Message  Contact: [phone]  Type: Needs Medical Advice  Who Called:  pt   Best Call Back Number: [phone]  Additional Information: patient is requesting a call back in regards to reports on her biopsy . Please call back and advise. Thank you

## 2020-07-30 NOTE — PROGRESS NOTES
Spoke to [facility] and they have confirmed fax for [facility] for pathology report have been received. [facility] will fax over Path report today.    AVELINO

## 2020-07-30 NOTE — PROGRESS NOTES
Old path rev'd.  Neg signs of kirsty, afb, fungus.  Consist with peh.    Will consider trial of ke gel +- cryo.

## 2020-07-30 NOTE — TELEPHONE ENCOUNTER
Spoke with patient, advised Dr Iqbal reviewed her old pathology report and recommends a follow up OV to do cryo test.  Appointment scheduled 8- at 1145 am.

## 2020-08-06 ENCOUNTER — TELEPHONE (OUTPATIENT)
Dept: SURGERY | Facility: CLINIC | Age: 75
End: 2020-08-06

## 2020-08-06 NOTE — TELEPHONE ENCOUNTER
----- Message from Lauren Anaya sent at 8/6/2020  8:54 AM CDT -----  Regarding: cancellation  Contact: patient  Type: Needs Medical Advice  Who Called:  patient  Best Call Back Number: [phone]  Additional Information: Patient cancelled apt. Due to illness.  [facility] would not allow me to reschedule.  Please call to advise and schedule.  Thanks!

## 2020-08-10 ENCOUNTER — OFFICE VISIT (OUTPATIENT)
Dept: DERMATOLOGY | Facility: CLINIC | Age: 75
End: 2020-08-10
Payer: MEDICARE

## 2020-08-10 DIAGNOSIS — L85.9 PSEUDOEPITHELIOMATOUS HYPERPLASIA: Primary | ICD-10-CM

## 2020-08-10 DIAGNOSIS — I87.8 VENOUS STASIS: ICD-10-CM

## 2020-08-10 PROCEDURE — 99999 PR PBB SHADOW E&M-EST. PATIENT-LVL III: ICD-10-PCS | Mod: PBBFAC,,, | Performed by: DERMATOLOGY

## 2020-08-10 PROCEDURE — 1101F PR PT FALLS ASSESS DOC 0-1 FALLS W/OUT INJ PAST YR: ICD-10-PCS | Mod: CPTII,S$GLB,, | Performed by: DERMATOLOGY

## 2020-08-10 PROCEDURE — 99999 PR PBB SHADOW E&M-EST. PATIENT-LVL III: CPT | Mod: PBBFAC,,, | Performed by: DERMATOLOGY

## 2020-08-10 PROCEDURE — 17110 PR DESTRUCTION BENIGN LESIONS UP TO 14: ICD-10-PCS | Mod: S$GLB,,, | Performed by: DERMATOLOGY

## 2020-08-10 PROCEDURE — 17110 DESTRUCTION B9 LES UP TO 14: CPT | Mod: S$GLB,,, | Performed by: DERMATOLOGY

## 2020-08-10 PROCEDURE — 1159F MED LIST DOCD IN RCRD: CPT | Mod: S$GLB,,, | Performed by: DERMATOLOGY

## 2020-08-10 PROCEDURE — 99212 OFFICE O/P EST SF 10 MIN: CPT | Mod: 25,S$GLB,, | Performed by: DERMATOLOGY

## 2020-08-10 PROCEDURE — 1125F PR PAIN SEVERITY QUANTIFIED, PAIN PRESENT: ICD-10-PCS | Mod: S$GLB,,, | Performed by: DERMATOLOGY

## 2020-08-10 PROCEDURE — 1125F AMNT PAIN NOTED PAIN PRSNT: CPT | Mod: S$GLB,,, | Performed by: DERMATOLOGY

## 2020-08-10 PROCEDURE — 1159F PR MEDICATION LIST DOCUMENTED IN MEDICAL RECORD: ICD-10-PCS | Mod: S$GLB,,, | Performed by: DERMATOLOGY

## 2020-08-10 PROCEDURE — 99212 PR OFFICE/OUTPT VISIT, EST, LEVL II, 10-19 MIN: ICD-10-PCS | Mod: 25,S$GLB,, | Performed by: DERMATOLOGY

## 2020-08-10 PROCEDURE — 1101F PT FALLS ASSESS-DOCD LE1/YR: CPT | Mod: CPTII,S$GLB,, | Performed by: DERMATOLOGY

## 2020-08-10 RX ORDER — PENTOXIFYLLINE 400 MG/1
TABLET, EXTENDED RELEASE ORAL
Qty: 90 TABLET | Refills: 1 | Status: SHIPPED | OUTPATIENT
Start: 2020-08-10

## 2020-08-10 NOTE — PATIENT INSTRUCTIONS
Take niacinamide along with the Trental.   Take ibuprofen and/or tylenol as needed for pain.   Use vaseline on the lower legs until flakes off.  Continue to use clobetasol on the new red areas.         CRYOSURGERY      Your doctor has used a method called cryosurgery to treat your skin condition. Cryosurgery refers to the use of very cold substances to treat a variety of skin conditions such as warts, pre-skin cancers, molluscum contagiosum, sun spots, and several benign growths. The substance we use in cryosurgery is liquid nitrogen and is so cold (-195 degrees Celsius) that is burns when administered.     Following treatment in the office, the skin may immediately burn and become red. You may find the area around the lesion is affected as well. It is sometimes necessary to treat not only the lesion, but a small area of the surrounding normal skin to achieve a good response.     A blister, and even a blood filled blister, may form after treatment.   This is a normal response. If the blister is painful, it is acceptable to sterilize a needle and with rubbing alcohol and gently pop the blister. It is important that you gently wash the area with soap and warm water as the blister fluid may contain wart virus if a wart was treated. Do no remove the roof of the blister.     The area treated can take anywhere from 1-3 weeks to heal. Healing time depends on the kind skin lesion treated, the location, and how aggressively the lesion was treated. It is recommended that the areas treated are covered with Vaseline or bacitracin ointment and a band-aid. If a band-aid is not practical, just ointment applied several times per day will do. Keeping these areas moist will speed the healing time.    Treatment with liquid nitrogen can leave a scar. In dark skin, it may be a light or dark scar, in light skin it may be a white or pink scar. These will generally fade with time, but may never go away completely.     If you have any  concerns after your treatment, please feel free to call the office.       [facility] [place] [address]/ [phone] FAX/ [email or web address]

## 2020-08-10 NOTE — PROGRESS NOTES
Subjective:       Patient ID:  Chen Levine is a 75 y.o. female who presents for   Chief Complaint   Patient presents with    Venous Stasis     leg     Has large bumpy areas on the shins for years.      Review of Systems   Constitutional: Negative for fever, chills, fatigue and malaise.   HENT: Negative for congestion and sore throat.    Respiratory: Negative for cough and shortness of breath.    Skin: Positive for rash.   Hematologic/Lymphatic: Does not bruise/bleed easily.        Objective:    Physical Exam   Constitutional: She appears well-developed and well-nourished. No distress.   Eyes: No conjunctival no injection.   Cardiovascular: There is dependent edema.  There is no local extremity swelling.     Neurological: She is alert and oriented to person, place, and time. She is not disoriented.   Psychiatric: She has a normal mood and affect.   Skin:   Areas Examined (abnormalities noted in diagram):   RLE Inspected  LLE Inspection Performed              Diagram Legend     Erythematous scaling macule/papule c/w actinic keratosis       Vascular papule c/w angioma      Pigmented verrucoid papule/plaque c/w seborrheic keratosis      Yellow umbilicated papule c/w sebaceous hyperplasia      Irregularly shaped tan macule c/w lentigo     1-2 mm smooth white papules consistent with Milia      Movable subcutaneous cyst with punctum c/w epidermal inclusion cyst      Subcutaneous movable cyst c/w pilar cyst      Firm pink to brown papule c/w dermatofibroma      Pedunculated fleshy papule(s) c/w skin tag(s)      Evenly pigmented macule c/w junctional nevus     Mildly variegated pigmented, slightly irregular-bordered macule c/w mildly atypical nevus      Flesh colored to evenly pigmented papule c/w intradermal nevus       Pink pearly papule/plaque c/w basal cell carcinoma      Erythematous hyperkeratotic cursted plaque c/w SCC      Surgical scar with no sign of skin cancer recurrence      Open and closed comedones       Inflammatory papules and pustules      Verrucoid papule consistent consistent with wart     Erythematous eczematous patches and plaques     Dystrophic onycholytic nail with subungual debris c/w onychomycosis     Umbilicated papule    Erythematous-base heme-crusted tan verrucoid plaque consistent with inflamed seborrheic keratosis     Erythematous Silvery Scaling Plaque c/w Psoriasis     See annotation      Assessment / Plan:        Pseudoepitheliomatous hyperplasia  Discussed getting copies of previous biopsy results and or old records.  Old records and/or outside records, notes, or pathology reports reviewed and discussed with the patient.  bx reports show peh wo signs of infection.  Secondary to stasis?  Discussed with patient the etiology and pathogenesis of the disease or skin lesion(s) and possible treatments and aggravators.    Reviewed with patient different treatment options and associated risks.  Trial of cryo today to 1 lesion l shin.  Cryosurgery procedure note:    Verbal consent from the patient is obtained including, but not limited to, risk of hypopigmentation/hyperpigmentation, scar, recurrence of lesion. Liquid nitrogen cryosurgery is applied to 1 lesions to produce a freeze injury. The patient is aware that blisters may form and is instructed on wound care with gentle cleansing and use of vaseline ointment to keep moist until healed. The patient is supplied a handout on cryosurgery and is instructed to call if lesions do not completely resolve.  Discussed risk of leg ulceration from any procedure, even cryo and EDC, due to poor blood flow of the lower extremities due to gravitational effects and age.    Venous stasis  -     pentoxifylline (TRENTAL) 400 mg TbSR; 1 po tid  Dispense: 90 tablet; Refill: 1  Discussed with patient need to wear compression stockings and to elevate legs regularly, especially with sitting.  Patient to consider elevating legs during sleep if no pain in the toes or  numbness.  Patient can consider using ace wrap in lieu of stockings but has to watch out for bluing of the toes or pain or numbness in the toes.  Patient may need evaluation with their primary or cardiologist for leg swelling.  Patient to ambulate regularly and exercise a few times a week if possible.  Patient to consider inversion table or lying down on the floor and propping up legs on a stool or cushions as a daily intervention.  Also b3 500 mg tid.  Reviewed with patient different treatment options and associated risks.             Follow up in about 2 weeks (around 8/24/2020).

## 2020-08-14 ENCOUNTER — OFFICE VISIT (OUTPATIENT)
Dept: SURGERY | Facility: CLINIC | Age: 75
End: 2020-08-14
Payer: MEDICARE

## 2020-08-14 ENCOUNTER — TELEPHONE (OUTPATIENT)
Dept: SURGERY | Facility: CLINIC | Age: 75
End: 2020-08-14

## 2020-08-14 VITALS
WEIGHT: 214.06 LBS | DIASTOLIC BLOOD PRESSURE: 64 MMHG | SYSTOLIC BLOOD PRESSURE: 134 MMHG | BODY MASS INDEX: 39.15 KG/M2 | HEART RATE: 72 BPM

## 2020-08-14 DIAGNOSIS — Z01.818 PREOP TESTING: ICD-10-CM

## 2020-08-14 DIAGNOSIS — K43.2 INCISIONAL HERNIA, WITHOUT OBSTRUCTION OR GANGRENE: Primary | ICD-10-CM

## 2020-08-14 DIAGNOSIS — K42.9 UMBILICAL HERNIA WITHOUT OBSTRUCTION AND WITHOUT GANGRENE: ICD-10-CM

## 2020-08-14 PROCEDURE — 99999 PR PBB SHADOW E&M-EST. PATIENT-LVL V: CPT | Mod: PBBFAC,,, | Performed by: PHYSICIAN ASSISTANT

## 2020-08-14 PROCEDURE — 99999 PR PBB SHADOW E&M-EST. PATIENT-LVL V: ICD-10-PCS | Mod: PBBFAC,,, | Performed by: PHYSICIAN ASSISTANT

## 2020-08-14 PROCEDURE — 1101F PR PT FALLS ASSESS DOC 0-1 FALLS W/OUT INJ PAST YR: ICD-10-PCS | Mod: CPTII,S$GLB,, | Performed by: PHYSICIAN ASSISTANT

## 2020-08-14 PROCEDURE — 3075F PR MOST RECENT SYSTOLIC BLOOD PRESS GE 130-139MM HG: ICD-10-PCS | Mod: CPTII,S$GLB,, | Performed by: PHYSICIAN ASSISTANT

## 2020-08-14 PROCEDURE — 1126F PR PAIN SEVERITY QUANTIFIED, NO PAIN PRESENT: ICD-10-PCS | Mod: S$GLB,,, | Performed by: PHYSICIAN ASSISTANT

## 2020-08-14 PROCEDURE — 1101F PT FALLS ASSESS-DOCD LE1/YR: CPT | Mod: CPTII,S$GLB,, | Performed by: PHYSICIAN ASSISTANT

## 2020-08-14 PROCEDURE — 1159F MED LIST DOCD IN RCRD: CPT | Mod: S$GLB,,, | Performed by: PHYSICIAN ASSISTANT

## 2020-08-14 PROCEDURE — 99202 PR OFFICE/OUTPT VISIT, NEW, LEVL II, 15-29 MIN: ICD-10-PCS | Mod: S$GLB,,, | Performed by: PHYSICIAN ASSISTANT

## 2020-08-14 PROCEDURE — 1126F AMNT PAIN NOTED NONE PRSNT: CPT | Mod: S$GLB,,, | Performed by: PHYSICIAN ASSISTANT

## 2020-08-14 PROCEDURE — 3078F PR MOST RECENT DIASTOLIC BLOOD PRESSURE < 80 MM HG: ICD-10-PCS | Mod: CPTII,S$GLB,, | Performed by: PHYSICIAN ASSISTANT

## 2020-08-14 PROCEDURE — 1159F PR MEDICATION LIST DOCUMENTED IN MEDICAL RECORD: ICD-10-PCS | Mod: S$GLB,,, | Performed by: PHYSICIAN ASSISTANT

## 2020-08-14 PROCEDURE — 99202 OFFICE O/P NEW SF 15 MIN: CPT | Mod: S$GLB,,, | Performed by: PHYSICIAN ASSISTANT

## 2020-08-14 PROCEDURE — 3008F PR BODY MASS INDEX (BMI) DOCUMENTED: ICD-10-PCS | Mod: CPTII,S$GLB,, | Performed by: PHYSICIAN ASSISTANT

## 2020-08-14 PROCEDURE — 3078F DIAST BP <80 MM HG: CPT | Mod: CPTII,S$GLB,, | Performed by: PHYSICIAN ASSISTANT

## 2020-08-14 PROCEDURE — 3075F SYST BP GE 130 - 139MM HG: CPT | Mod: CPTII,S$GLB,, | Performed by: PHYSICIAN ASSISTANT

## 2020-08-14 PROCEDURE — 3008F BODY MASS INDEX DOCD: CPT | Mod: CPTII,S$GLB,, | Performed by: PHYSICIAN ASSISTANT

## 2020-08-14 RX ORDER — SODIUM CHLORIDE 9 MG/ML
INJECTION, SOLUTION INTRAVENOUS CONTINUOUS
Status: CANCELLED | OUTPATIENT
Start: 2020-08-14

## 2020-08-14 RX ORDER — TERBINAFINE HYDROCHLORIDE 250 MG/1
TABLET ORAL
COMMUNITY
Start: 2020-04-24 | End: 2020-08-28

## 2020-08-14 NOTE — TELEPHONE ENCOUNTER
----- Message from Juanjose Orozco LPN sent at 8/14/2020 11:12 AM CDT -----  This pt would like surgery on 08/31/20 with dr hawthorne seen in office on 08/14/20 with michael ribeiro,please call pt to scheduled preop

## 2020-08-14 NOTE — PROGRESS NOTES
Patient ID: Chen Levine is a 75 y.o. female.    Chief Complaint: Consult (ventral hernia)      HPI:  Chen Levine is a 75 y.o. female with a pmhx of hypertension, renal cell carcinoma s/p nephrectomy, hyperlipidemia, diabetes. She was referred to the General Surgery clinic for evaluation of a ventral hernia. She had a robotic nephrectomy 11 months ago for renal cell carcinoma.  She has been doing well since then but recently noticed some discomfort and a slight bulge in her right lower abdomen. She had a CT scan a couple of months ago which showed a small 2.5 ventral incisional hernia containing fat and bowel without signs of obstruction. Since then, she reports gradual worsening of the discomfort in the area. She denies severe pain, nausea, vomiting or obstructive symptoms.       Review of Systems   Constitutional: Negative for activity change, chills and fever.   Respiratory: Negative for shortness of breath.    Cardiovascular: Negative for chest pain.   Gastrointestinal: Positive for abdominal pain (mild, gradually worsening in right abdomen). Negative for nausea and vomiting.   Genitourinary: Negative for dysuria.   Musculoskeletal: Positive for arthralgias (chronic). Negative for myalgias.   Skin: Negative for wound.   Neurological: Negative for weakness.   Hematological: Does not bruise/bleed easily.   Psychiatric/Behavioral: The patient is not nervous/anxious.        Current Outpatient Medications   Medication Sig Dispense Refill    acetaminophen (TYLENOL) 325 MG tablet Take 2 tablets (650 mg total) by mouth every 6 (six) hours as needed for Pain.  0    atorvastatin (LIPITOR) 20 MG tablet Take 1 tablet (20 mg total) by mouth Daily. 90 tablet 1    clobetasoL (TEMOVATE) 0.05 % cream APPLY  CREAM TOPICALLY TO AFFECTED AREA TWICE DAILY 60 g 0    diltiaZEM (CARDIZEM) 90 MG tablet Take 1 tablet (90 mg total) by mouth 2 (two) times daily. 180 tablet 1    docusate sodium (COLACE) 100 MG capsule  Take 1 capsule (100 mg total) by mouth 2 (two) times daily.  0    doxycycline (VIBRAMYCIN) 100 MG Cap Take 1 capsule (100 mg total) by mouth 2 (two) times daily. 20 capsule 0    doxycycline (VIBRAMYCIN) 100 MG Cap Take 1 capsule (100 mg total) by mouth 2 (two) times daily. 60 capsule 1    flurazepam (DALMANE) 15 MG Cap TAKE 1 TO 2 CAPSULES BY MOUTH AT BEDTIME AS NEEDED FOR SLEEP      furosemide (LASIX) 40 MG tablet Take 1 tablet (40 mg total) by mouth 3 (three) times a week. (Patient taking differently: Take 40 mg by mouth once daily. ) 90 tablet 1    gabapentin (NEURONTIN) 600 MG tablet Take 600 mg by mouth once daily.       HYDROcodone-acetaminophen (NORCO) 5-325 mg per tablet Take 1 tablet by mouth every 8 (eight) hours as needed for Pain. 30 tablet 0    levothyroxine (SYNTHROID) 50 MCG tablet       pentoxifylline (TRENTAL) 400 mg TbSR 1 po tid 90 tablet 1    pyridoxine, vitamin B6, (VITAMIN B-6) 100 MG Tab Take 50 mg by mouth once daily.      terbinafine HCL (LAMISIL) 250 mg tablet TAKE 1 TABLET BY MOUTH ONCE DAILY      traMADol (ULTRAM) 50 mg tablet Take 1 tablet (50 mg total) by mouth every 8 (eight) hours as needed (pain not relieved by otc agents). 15 tablet 0    escitalopram oxalate (LEXAPRO) 5 MG Tab Take 1 tablet (5 mg total) by mouth once daily. 90 tablet 1     No current facility-administered medications for this visit.        Review of patient's allergies indicates:   Allergen Reactions    Penicillins Itching and Rash    Promethazine Itching and Rash    Sulfa (sulfonamide antibiotics) Itching and Rash       Past Medical History:   Diagnosis Date    Anxiety     Asthma     childhood currently resolved    Cancer 1984    bladder    Chronic venous stasis     Colon polyps     Deep venous thrombosis 1970'S    In the leg    Degenerative disc disease at L5-S1 level     Depression     Hyperlipidemia     Hypertension     Obesity     Overactive bladder     Pyoderma [id number]    No  histological diagnosis    Renal cell cancer, right 09/2019    Wears partial dentures     UPPER PARTIAL       Past Surgical History:   Procedure Laterality Date    BLADDER SURGERY  1985    c-spine fusion      EPIDURAL STEROID INJECTION  2019    x2     HYSTERECTOMY  1977    ROBOT-ASSISTED LAPAROSCOPIC NEPHRECTOMY Right 9/18/2019    Procedure: ROBOTIC NEPHRECTOMY;  Surgeon: Manny Mondragon MD;  Location: [facility];  Service: Urology;  Laterality: Right;    SPINE SURGERY  1987       Family History   Problem Relation Age of Onset    Cancer Mother     Vision loss Mother     Hypertension Father     Stroke Father        Social History     Socioeconomic History    Marital status:      Spouse name: Not on file    Number of children: Not on file    Years of education: Not on file    Highest education level: Not on file   Occupational History    Not on file   Social Needs    Financial resource strain: Not on file    Food insecurity     Worry: Not on file     Inability: Not on file    Transportation needs     Medical: Not on file     Non-medical: Not on file   Tobacco Use    Smoking status: Current Every Day Smoker     Packs/day: 0.75     Types: Cigarettes    Smokeless tobacco: Never Used   Substance and Sexual Activity    Alcohol use: No    Drug use: No    Sexual activity: Not on file   Lifestyle    Physical activity     Days per week: Not on file     Minutes per session: Not on file    Stress: Not on file   Relationships    Social connections     Talks on phone: Not on file     Gets together: Not on file     Attends Taoism service: Not on file     Active member of club or organization: Not on file     Attends meetings of clubs or organizations: Not on file     Relationship status: Not on file   Other Topics Concern    Not on file   Social History Narrative    Not on file       Vitals:    08/14/20 1051   BP: 134/64   Pulse: 72       Physical Exam  Vitals signs reviewed.    Constitutional:       Appearance: She is well-developed. She is obese.   HENT:      Head: Normocephalic and atraumatic.   Cardiovascular:      Rate and Rhythm: Normal rate and regular rhythm.   Pulmonary:      Effort: Pulmonary effort is normal. No respiratory distress.   Abdominal:      Palpations: Abdomen is soft.      Hernia: A hernia (partially reducible right side incisional hernia. mild discomfort with palpation. also noted a small umbilical hernia. ) is present.      Comments: Obese abdomen   Musculoskeletal: Normal range of motion.   Skin:     General: Skin is warm and dry.   Neurological:      Mental Status: She is alert.   Psychiatric:         Thought Content: Thought content normal.         Assessment & Plan:    right lower abdomen ventral incisional hernia, small umbilical hernia.   The incisional hernia is gradually becoming more uncomfortable. Also noted to contain a knuckle of bowel.   Recommend robotic repair of the incisional and umbilical hernias. The risks of robotic hernia repair including bleeding, infection, injury to bowel, mesh infection, possibility of open procedure were explained to the patient and she verbalized understanding.  The nature of the patient's condition, probability of success, risks of refusing treatment, and alternatives and risks of the alternatives were also explained.   Will plan for robot assisted incisional hernia repair on 8/31/20 with Dr Jaramillo.

## 2020-08-14 NOTE — LETTER
August 14, 2020      Manny Mondragon MD  [address] [place] [address] Dr  [address]  [facility] [address]           [facility] - General Surgery  [address]  [facility] [address]  Phone: [phone]          Patient: Chen Levine   MR Number: [id number]   YOB: 1945   Date of Visit: 8/14/2020       Dear Dr. Manny Mondragon:    Thank you for referring Chen Levine to me for evaluation. Attached you will find relevant portions of my assessment and plan of care.    If you have questions, please do not hesitate to call me. I look forward to following Chen Levine along with you.    Sincerely,    Nara Brerios PA-C    Enclosure  CC:  No Recipients    If you would like to receive this communication electronically, please contact [email or web address] or [phone] to request more information on [email or web address] Link access.    For providers and/or their staff who would like to refer a patient to [facility], please contact us through our one-stop-shop provider referral line, [facility], at [phone].    If you feel you have received this communication in error or would no longer like to receive these types of communications, please e-mail [email or web address]

## 2020-08-26 ENCOUNTER — OFFICE VISIT (OUTPATIENT)
Dept: DERMATOLOGY | Facility: CLINIC | Age: 75
End: 2020-08-26
Payer: MEDICARE

## 2020-08-26 VITALS — BODY MASS INDEX: 39.15 KG/M2 | WEIGHT: 214.06 LBS

## 2020-08-26 DIAGNOSIS — I87.2 VENOUS STASIS DERMATITIS, UNSPECIFIED LATERALITY: ICD-10-CM

## 2020-08-26 DIAGNOSIS — E11.628 DIABETIC DERMOPATHY: Primary | ICD-10-CM

## 2020-08-26 DIAGNOSIS — I87.8 VENOUS STASIS: ICD-10-CM

## 2020-08-26 DIAGNOSIS — L85.9 PSEUDOEPITHELIOMATOUS HYPERPLASIA: ICD-10-CM

## 2020-08-26 DIAGNOSIS — L98.8 DIABETIC DERMOPATHY: Primary | ICD-10-CM

## 2020-08-26 PROCEDURE — 3008F BODY MASS INDEX DOCD: CPT | Mod: CPTII,S$GLB,, | Performed by: DERMATOLOGY

## 2020-08-26 PROCEDURE — 99999 PR PBB SHADOW E&M-EST. PATIENT-LVL III: ICD-10-PCS | Mod: PBBFAC,,, | Performed by: DERMATOLOGY

## 2020-08-26 PROCEDURE — 3008F PR BODY MASS INDEX (BMI) DOCUMENTED: ICD-10-PCS | Mod: CPTII,S$GLB,, | Performed by: DERMATOLOGY

## 2020-08-26 PROCEDURE — 1159F MED LIST DOCD IN RCRD: CPT | Mod: S$GLB,,, | Performed by: DERMATOLOGY

## 2020-08-26 PROCEDURE — 99213 OFFICE O/P EST LOW 20 MIN: CPT | Mod: S$GLB,,, | Performed by: DERMATOLOGY

## 2020-08-26 PROCEDURE — 1125F AMNT PAIN NOTED PAIN PRSNT: CPT | Mod: S$GLB,,, | Performed by: DERMATOLOGY

## 2020-08-26 PROCEDURE — 1159F PR MEDICATION LIST DOCUMENTED IN MEDICAL RECORD: ICD-10-PCS | Mod: S$GLB,,, | Performed by: DERMATOLOGY

## 2020-08-26 PROCEDURE — 99213 PR OFFICE/OUTPT VISIT, EST, LEVL III, 20-29 MIN: ICD-10-PCS | Mod: S$GLB,,, | Performed by: DERMATOLOGY

## 2020-08-26 PROCEDURE — 1101F PT FALLS ASSESS-DOCD LE1/YR: CPT | Mod: CPTII,S$GLB,, | Performed by: DERMATOLOGY

## 2020-08-26 PROCEDURE — 1125F PR PAIN SEVERITY QUANTIFIED, PAIN PRESENT: ICD-10-PCS | Mod: S$GLB,,, | Performed by: DERMATOLOGY

## 2020-08-26 PROCEDURE — 99999 PR PBB SHADOW E&M-EST. PATIENT-LVL III: CPT | Mod: PBBFAC,,, | Performed by: DERMATOLOGY

## 2020-08-26 PROCEDURE — 1101F PR PT FALLS ASSESS DOC 0-1 FALLS W/OUT INJ PAST YR: ICD-10-PCS | Mod: CPTII,S$GLB,, | Performed by: DERMATOLOGY

## 2020-08-26 NOTE — PROGRESS NOTES
Subjective:       Patient ID:  Chen Levine is a 75 y.o. female who presents for   Chief Complaint   Patient presents with    Skin Check     bilateral lower legs     LOV 08/10/2020      Patient returned to clinic today for a follow up skin check to bilateral lower extremities.       Review of Systems   Constitutional: Positive for fatigue. Negative for fever, chills, weight loss and malaise.   HENT: Negative for congestion and sore throat.    Eyes: Positive for visual change.   Respiratory: Negative for cough and shortness of breath.    Gastrointestinal: Negative for nausea, vomiting, diarrhea and constipation.   Skin: Positive for rash, daily sunscreen use and activity-related sunscreen use. Negative for wears hat.   Neurological: Negative for numbness.        Objective:    Physical Exam   Constitutional: She appears well-developed and well-nourished. No distress.   Eyes: No conjunctival no injection.   Cardiovascular: There is dependent edema.  There is no local extremity swelling.     Neurological: She is alert and oriented to person, place, and time. She is not disoriented.   Psychiatric: She has a normal mood and affect.   Skin:   Areas Examined (abnormalities noted in diagram):   RLE Inspected  LLE Inspection Performed              Diagram Legend     Erythematous scaling macule/papule c/w actinic keratosis       Vascular papule c/w angioma      Pigmented verrucoid papule/plaque c/w seborrheic keratosis      Yellow umbilicated papule c/w sebaceous hyperplasia      Irregularly shaped tan macule c/w lentigo     1-2 mm smooth white papules consistent with Milia      Movable subcutaneous cyst with punctum c/w epidermal inclusion cyst      Subcutaneous movable cyst c/w pilar cyst      Firm pink to brown papule c/w dermatofibroma      Pedunculated fleshy papule(s) c/w skin tag(s)      Evenly pigmented macule c/w junctional nevus     Mildly variegated pigmented, slightly irregular-bordered macule c/w mildly  atypical nevus      Flesh colored to evenly pigmented papule c/w intradermal nevus       Pink pearly papule/plaque c/w basal cell carcinoma      Erythematous hyperkeratotic cursted plaque c/w SCC      Surgical scar with no sign of skin cancer recurrence      Open and closed comedones      Inflammatory papules and pustules      Verrucoid papule consistent consistent with wart     Erythematous eczematous patches and plaques     Dystrophic onycholytic nail with subungual debris c/w onychomycosis     Umbilicated papule    Erythematous-base heme-crusted tan verrucoid plaque consistent with inflamed seborrheic keratosis     Erythematous Silvery Scaling Plaque c/w Psoriasis     See annotation      Assessment / Plan:        Diabetic dermopathy  -     POCT HEMOGLOBIN A1C  Discussed with patient the etiology and pathogenesis of the disease or skin lesion(s) and possible treatments and aggravators.    Chronic nature of this condition discussed with patient.  rev'd poss diabetes.  Pt has not been cardenas'd before.  Discussed with patient the need for laboratory work up for further evaluation.  Discussed that such investigation may not be helpful.    Pseudoepitheliomatous hyperplasia  Discussed with patient the etiology and pathogenesis of the disease or skin lesion(s) and possible treatments and aggravators.    Chronic nature of this condition discussed with patient.    Venous stasis  Discussed with patient need to wear compression stockings and to elevate legs regularly, especially with sitting.  Patient to consider elevating legs during sleep if no pain in the toes or numbness.  Patient can consider using ace wrap in lieu of stockings but has to watch out for bluing of the toes or pain or numbness in the toes.  Patient may need evaluation with their primary or cardiologist for leg swelling.  Patient to ambulate regularly and exercise a few times a week if possible.  Patient to consider inversion table or lying down on the floor  and propping up legs on a stool or cushions as a daily intervention.    Venous stasis dermatitis, unspecified laterality  Cont clob bid prn.  Discussed with patient the etiology and pathogenesis of the disease or skin lesion(s) and possible treatments and aggravators.    Reviewed with patient different treatment options and associated risks.             Follow up if symptoms worsen or fail to improve.

## 2020-08-28 ENCOUNTER — HOSPITAL ENCOUNTER (OUTPATIENT)
Dept: PREADMISSION TESTING | Facility: HOSPITAL | Age: 75
Discharge: HOME | End: 2020-08-28
Attending: SURGERY
Payer: MEDICARE

## 2020-08-28 ENCOUNTER — LAB VISIT (OUTPATIENT)
Dept: PRIMARY CARE CLINIC | Facility: CLINIC | Age: 75
End: 2020-08-28
Payer: MEDICARE

## 2020-08-28 ENCOUNTER — ANESTHESIA EVENT (OUTPATIENT)
Dept: SURGERY | Facility: HOSPITAL | Age: 75
End: 2020-08-28
Payer: MEDICARE

## 2020-08-28 VITALS — HEIGHT: 62 IN | WEIGHT: 214 LBS | BODY MASS INDEX: 39.38 KG/M2

## 2020-08-28 DIAGNOSIS — K43.0 INCISIONAL HERNIA WITH OBSTRUCTION BUT NO GANGRENE: Primary | ICD-10-CM

## 2020-08-28 DIAGNOSIS — L98.8 DIABETIC DERMOPATHY: Primary | ICD-10-CM

## 2020-08-28 DIAGNOSIS — E11.628 DIABETIC DERMOPATHY: Primary | ICD-10-CM

## 2020-08-28 DIAGNOSIS — Z01.818 PRE-OP EXAM: ICD-10-CM

## 2020-08-28 DIAGNOSIS — Z01.818 PREOP TESTING: ICD-10-CM

## 2020-08-28 LAB
ALBUMIN SERPL BCP-MCNC: 3.4 G/DL (ref 3.5–5.2)
ALP SERPL-CCNC: 116 U/L (ref 55–135)
ALT SERPL W/O P-5'-P-CCNC: 12 U/L (ref 10–44)
ANION GAP SERPL CALC-SCNC: 9 MMOL/L (ref 8–16)
AST SERPL-CCNC: 14 U/L (ref 10–40)
BASOPHILS # BLD AUTO: 0.09 K/UL (ref 0–0.2)
BASOPHILS NFR BLD: 0.7 % (ref 0–1.9)
BILIRUB SERPL-MCNC: 0.3 MG/DL (ref 0.1–1)
BUN SERPL-MCNC: 17 MG/DL (ref 8–23)
CALCIUM SERPL-MCNC: 8.8 MG/DL (ref 8.7–10.5)
CHLORIDE SERPL-SCNC: 105 MMOL/L (ref 95–110)
CO2 SERPL-SCNC: 25 MMOL/L (ref 23–29)
CREAT SERPL-MCNC: 1 MG/DL (ref 0.5–1.4)
DIFFERENTIAL METHOD: ABNORMAL
EOSINOPHIL # BLD AUTO: 0.1 K/UL (ref 0–0.5)
EOSINOPHIL NFR BLD: 0.8 % (ref 0–8)
ERYTHROCYTE [DISTWIDTH] IN BLOOD BY AUTOMATED COUNT: 16.4 % (ref 11.5–14.5)
EST. GFR  (AFRICAN AMERICAN): >60 ML/MIN/1.73 M^2
EST. GFR  (NON AFRICAN AMERICAN): 56 ML/MIN/1.73 M^2
GLUCOSE SERPL-MCNC: 105 MG/DL (ref 70–110)
HCT VFR BLD AUTO: 41.6 % (ref 37–48.5)
HGB BLD-MCNC: 12.9 G/DL (ref 12–16)
IMM GRANULOCYTES # BLD AUTO: 0.07 K/UL (ref 0–0.04)
IMM GRANULOCYTES NFR BLD AUTO: 0.5 % (ref 0–0.5)
LYMPHOCYTES # BLD AUTO: 2.4 K/UL (ref 1–4.8)
LYMPHOCYTES NFR BLD: 18.5 % (ref 18–48)
MCH RBC QN AUTO: 26.7 PG (ref 27–31)
MCHC RBC AUTO-ENTMCNC: 31 G/DL (ref 32–36)
MCV RBC AUTO: 86 FL (ref 82–98)
MONOCYTES # BLD AUTO: 1.2 K/UL (ref 0.3–1)
MONOCYTES NFR BLD: 9.2 % (ref 4–15)
NEUTROPHILS # BLD AUTO: 9.1 K/UL (ref 1.8–7.7)
NEUTROPHILS NFR BLD: 70.3 % (ref 38–73)
NRBC BLD-RTO: 0 /100 WBC
PLATELET # BLD AUTO: 453 K/UL (ref 150–350)
PMV BLD AUTO: 9.6 FL (ref 9.2–12.9)
POTASSIUM SERPL-SCNC: 4 MMOL/L (ref 3.5–5.1)
PROT SERPL-MCNC: 7.9 G/DL (ref 6–8.4)
RBC # BLD AUTO: 4.84 M/UL (ref 4–5.4)
SODIUM SERPL-SCNC: 139 MMOL/L (ref 136–145)
WBC # BLD AUTO: 12.99 K/UL (ref 3.9–12.7)

## 2020-08-28 PROCEDURE — 99900104 DSU ONLY-NO CHARGE-EA ADD'L HR (STAT)

## 2020-08-28 PROCEDURE — 80053 COMPREHEN METABOLIC PANEL: CPT

## 2020-08-28 PROCEDURE — 99900103 DSU ONLY-NO CHARGE-INITIAL HR (STAT)

## 2020-08-28 PROCEDURE — 93005 ELECTROCARDIOGRAM TRACING: CPT

## 2020-08-28 PROCEDURE — U0003 INFECTIOUS AGENT DETECTION BY NUCLEIC ACID (DNA OR RNA); SEVERE ACUTE RESPIRATORY SYNDROME CORONAVIRUS 2 (SARS-COV-2) (CORONAVIRUS DISEASE [COVID-19]), AMPLIFIED PROBE TECHNIQUE, MAKING USE OF HIGH THROUGHPUT TECHNOLOGIES AS DESCRIBED BY CMS-2020-01-R: HCPCS

## 2020-08-28 PROCEDURE — 85025 COMPLETE CBC W/AUTO DIFF WBC: CPT

## 2020-08-28 NOTE — DISCHARGE INSTRUCTIONS
To confirm, Your doctor has instructed you that surgery is scheduled for: 8/31/2020    Please report to [facility], Registration the morning of surgery. You must check-in and receive a wristband before going to your procedure.    Pre-Op will call the FRIDAY prior to surgery between 1:00 and 6:00 PM with the final arrival time.  Phone number: [phone]    PLEASE NOTE:  The surgery schedule has many variables which may affect the time of your surgery case.  Family members should be available if your surgery time changes.  Plan to be here the day of your procedure between 4-6 hours.    MEDICATIONS:  TAKE ONLY THESE MEDICATIONS WITH A SMALL SIP OF WATER THE MORNING OF YOUR PROCEDURE:  ATORVASTATIN, DILTIAZEM, GABAPENTIN, LEVOTHYROXINE, TRENTAL, HYDROCODONE OR TRAMADOL IF NEEDED      DO NOT TAKE THESE MEDICATIONS 5-7 DAYS PRIOR to your procedure or per your surgeon's request:   ASPIRIN, ALEVE, ADVIL, IBUPROFEN, FISH OIL VITAMIN E, HERBALS  (May take Tylenol)    ONLY if you are prescribed any types of blood thinners such as:  Aspirin, Coumadin, Plavix, Pradaxa, Xarelto, Aggrenox, Effient, Eliquis, Savasya, Brilinta, or any other, ask your surgeon whether you should stop taking them and how long before surgery you should stop.  You may also need to verify with the prescribing physician if it is ok to stop your medication.      INSTRUCTIONS IMPORTANT!!  · Do not eat or drink anything between midnight and the time of your procedure- this includes gum, mints, and candy.  · Do not smoke or drink alcoholic beverages 24 hours prior to your procedure.  · Shower the night before AND the morning of your procedure with a Chlorhexidine wash such as Hibiclens or Dial antibacterial soap from the neck down.  Do not get it on your face or in your eyes.  You may use your own shampoo and face wash. This helps your skin to be as bacteria free as possible.    · If you wear contact lenses, dentures, hearing aids or  glasses, bring a container to put them in during surgery and give to a family member for safe keeping.  Please leave all jewelry, piercing's and valuables at home.   · DO NOT remove hair from the surgery site.  Do not shave the incision site unless you are given specific instructions to do so.    · ONLY if you have been diagnosed with sleep apnea please bring your C-PAP machine.  · ONLY if you wear home oxygen please bring your portable oxygen tank the day of your procedure.  · ONLY if you have a history of OPEN HEART SURGERY you will need a clearance from your Cardiologist per Anesthesia.      · ONLY for patients requiring bowel prep, written instructions will be given by your doctor's office.  · ONLY if you have a neuro stimulator, please bring the controller with you the morning of surgery  · ONLY if a type and screen test is needed before surgery, please return: N/A  · If your doctor has scheduled you for an overnight stay, bring a small overnight bag with any personal items you need.  · Make arrangements in advance for transportation home by a responsible adult.  It is not safe to drive a vehicle during the 24 hours after anesthesia.     · ONLY ONE VISITOR PER PATIENT IS ALLOWED TO COME IN THE HOSPITAL THE DAY OF PROCEDURE.   · Visiting hours are 8:00AM-6:00PM. For the safety of all patients, visitors under the age of 12 are not allowed above the first floor of the hospital.    · All [facility] facilities and properties are tobacco free.  Smoking is NOT allowed.       If you have any questions about these instructions, call Pre-Op Admit  Nursing at [phone] or the Pre-Op Day Surgery Unit at [phone].

## 2020-08-29 LAB — SARS-COV-2 RNA RESP QL NAA+PROBE: NOT DETECTED

## 2020-08-31 ENCOUNTER — ANESTHESIA (OUTPATIENT)
Dept: SURGERY | Facility: HOSPITAL | Age: 75
End: 2020-08-31
Payer: MEDICARE

## 2020-08-31 ENCOUNTER — TELEPHONE (OUTPATIENT)
Dept: SURGERY | Facility: CLINIC | Age: 75
End: 2020-08-31

## 2020-08-31 PROBLEM — K43.2 INCISIONAL HERNIA, WITHOUT OBSTRUCTION OR GANGRENE: Status: ACTIVE | Noted: 2020-08-31

## 2020-08-31 PROCEDURE — 63600175 PHARM REV CODE 636 W HCPCS: Performed by: NURSE ANESTHETIST, CERTIFIED REGISTERED

## 2020-08-31 PROCEDURE — 25000003 PHARM REV CODE 250: Performed by: PHYSICIAN ASSISTANT

## 2020-08-31 PROCEDURE — 25000003 PHARM REV CODE 250: Performed by: NURSE ANESTHETIST, CERTIFIED REGISTERED

## 2020-08-31 PROCEDURE — D9220A PRA ANESTHESIA: ICD-10-PCS | Mod: CRNA,,, | Performed by: NURSE ANESTHETIST, CERTIFIED REGISTERED

## 2020-08-31 PROCEDURE — D9220A PRA ANESTHESIA: Mod: CRNA,,, | Performed by: NURSE ANESTHETIST, CERTIFIED REGISTERED

## 2020-08-31 PROCEDURE — D9220A PRA ANESTHESIA: Mod: ANES,,, | Performed by: ANESTHESIOLOGY

## 2020-08-31 PROCEDURE — D9220A PRA ANESTHESIA: ICD-10-PCS | Mod: ANES,,, | Performed by: ANESTHESIOLOGY

## 2020-08-31 RX ORDER — ROCURONIUM BROMIDE 10 MG/ML
INJECTION, SOLUTION INTRAVENOUS
Status: DISCONTINUED | OUTPATIENT
Start: 2020-08-31 | End: 2020-08-31

## 2020-08-31 RX ORDER — LIDOCAINE HCL/PF 100 MG/5ML
SYRINGE (ML) INTRAVENOUS
Status: DISCONTINUED | OUTPATIENT
Start: 2020-08-31 | End: 2020-08-31

## 2020-08-31 RX ORDER — ACETAMINOPHEN 10 MG/ML
INJECTION, SOLUTION INTRAVENOUS
Status: DISCONTINUED | OUTPATIENT
Start: 2020-08-31 | End: 2020-08-31

## 2020-08-31 RX ORDER — ONDANSETRON HYDROCHLORIDE 2 MG/ML
INJECTION, SOLUTION INTRAMUSCULAR; INTRAVENOUS
Status: DISCONTINUED | OUTPATIENT
Start: 2020-08-31 | End: 2020-08-31

## 2020-08-31 RX ORDER — DEXAMETHASONE SODIUM PHOSPHATE 4 MG/ML
INJECTION, SOLUTION INTRA-ARTICULAR; INTRALESIONAL; INTRAMUSCULAR; INTRAVENOUS; SOFT TISSUE
Status: DISCONTINUED | OUTPATIENT
Start: 2020-08-31 | End: 2020-08-31

## 2020-08-31 RX ORDER — PROPOFOL 10 MG/ML
VIAL (ML) INTRAVENOUS
Status: DISCONTINUED | OUTPATIENT
Start: 2020-08-31 | End: 2020-08-31

## 2020-08-31 RX ORDER — FENTANYL CITRATE 50 UG/ML
INJECTION, SOLUTION INTRAMUSCULAR; INTRAVENOUS
Status: DISCONTINUED | OUTPATIENT
Start: 2020-08-31 | End: 2020-08-31

## 2020-08-31 RX ORDER — SUCCINYLCHOLINE CHLORIDE 20 MG/ML
INJECTION INTRAMUSCULAR; INTRAVENOUS
Status: DISCONTINUED | OUTPATIENT
Start: 2020-08-31 | End: 2020-08-31

## 2020-08-31 RX ORDER — GLYCOPYRROLATE 0.2 MG/ML
INJECTION INTRAMUSCULAR; INTRAVENOUS
Status: DISCONTINUED | OUTPATIENT
Start: 2020-08-31 | End: 2020-08-31

## 2020-08-31 RX ORDER — PHENYLEPHRINE HYDROCHLORIDE 10 MG/ML
INJECTION INTRAVENOUS
Status: DISCONTINUED | OUTPATIENT
Start: 2020-08-31 | End: 2020-08-31

## 2020-08-31 RX ADMIN — CLINDAMYCIN PHOSPHATE 900 MG: 18 INJECTION, SOLUTION INTRAVENOUS at 07:08

## 2020-08-31 RX ADMIN — LIDOCAINE HYDROCHLORIDE 75 MG: 20 INJECTION, SOLUTION INTRAVENOUS at 07:08

## 2020-08-31 RX ADMIN — FENTANYL CITRATE 50 MCG: 50 INJECTION, SOLUTION INTRAMUSCULAR; INTRAVENOUS at 07:08

## 2020-08-31 RX ADMIN — PHENYLEPHRINE HYDROCHLORIDE 100 MCG: 10 INJECTION INTRAVENOUS at 07:08

## 2020-08-31 RX ADMIN — SUGAMMADEX 300 MG: 100 INJECTION, SOLUTION INTRAVENOUS at 09:08

## 2020-08-31 RX ADMIN — PROPOFOL 150 MG: 10 INJECTION, EMULSION INTRAVENOUS at 07:08

## 2020-08-31 RX ADMIN — ROCURONIUM BROMIDE 10 MG: 10 INJECTION, SOLUTION INTRAVENOUS at 08:08

## 2020-08-31 RX ADMIN — ONDANSETRON 4 MG: 2 INJECTION, SOLUTION INTRAMUSCULAR; INTRAVENOUS at 09:08

## 2020-08-31 RX ADMIN — GLYCOPYRROLATE 0.2 MG: 0.2 INJECTION, SOLUTION INTRAMUSCULAR; INTRAVENOUS at 07:08

## 2020-08-31 RX ADMIN — ROCURONIUM BROMIDE 10 MG: 10 INJECTION, SOLUTION INTRAVENOUS at 07:08

## 2020-08-31 RX ADMIN — ACETAMINOPHEN 1000 MG: 10 INJECTION, SOLUTION INTRAVENOUS at 07:08

## 2020-08-31 RX ADMIN — FENTANYL CITRATE 50 MCG: 50 INJECTION, SOLUTION INTRAMUSCULAR; INTRAVENOUS at 09:08

## 2020-08-31 RX ADMIN — ONDANSETRON 4 MG: 2 INJECTION, SOLUTION INTRAMUSCULAR; INTRAVENOUS at 07:08

## 2020-08-31 RX ADMIN — SUCCINYLCHOLINE CHLORIDE 120 MG: 20 INJECTION, SOLUTION INTRAMUSCULAR; INTRAVENOUS; PARENTERAL at 07:08

## 2020-08-31 RX ADMIN — FENTANYL CITRATE 50 MCG: 50 INJECTION, SOLUTION INTRAMUSCULAR; INTRAVENOUS at 08:08

## 2020-08-31 RX ADMIN — ROCURONIUM BROMIDE 30 MG: 10 INJECTION, SOLUTION INTRAVENOUS at 07:08

## 2020-08-31 RX ADMIN — DEXAMETHASONE SODIUM PHOSPHATE 4 MG: 4 INJECTION, SOLUTION INTRA-ARTICULAR; INTRALESIONAL; INTRAMUSCULAR; INTRAVENOUS; SOFT TISSUE at 07:08

## 2020-08-31 RX ADMIN — PHENYLEPHRINE HYDROCHLORIDE 20 MCG/MIN: 10 INJECTION INTRAVENOUS at 07:08

## 2020-08-31 NOTE — TRANSFER OF CARE
"Anesthesia Transfer of Care Note    Patient: Chen Levine    Procedure(s) Performed: Procedure(s) (LRB):  ROBOTIC REPAIR, HERNIA, INCISIONAL (N/A)    Patient location: PACU    Anesthesia Type: general    Transport from OR: Transported from OR on 2-3 L/min O2 by NC with adequate spontaneous ventilation    Post pain: adequate analgesia    Post assessment: no apparent anesthetic complications and tolerated procedure well    Post vital signs: stable    Level of consciousness: sedated    Nausea/Vomiting: no nausea/vomiting    Complications: none    Transfer of care protocol was followed      Last vitals:   Visit Vitals  BP (!) 158/67 (BP Location: Left arm, Patient Position: Lying)   Pulse 66   Temp 36.6 °C (97.9 °F) (Temporal)   Resp 20   Ht 5' 2" (1.575 m)   Wt 97.1 kg (214 lb)   SpO2 96%   Breastfeeding No   BMI 39.14 kg/m²     " 89

## 2020-08-31 NOTE — ANESTHESIA POSTPROCEDURE EVALUATION
Anesthesia Post Evaluation    Patient: Chen Levine    Procedure(s) Performed: Procedure(s) (LRB):  ROBOTIC REPAIR, HERNIA, INCISIONAL (N/A)    Final Anesthesia Type: general    Patient location during evaluation: PACU  Patient participation: Yes- Able to Participate  Level of consciousness: awake and alert  Post-procedure vital signs: reviewed and stable  Pain management: adequate  Airway patency: patent    PONV status at discharge: No PONV  Anesthetic complications: no      Cardiovascular status: hemodynamically stable  Respiratory status: unassisted and room air  Hydration status: euvolemic  Follow-up not needed.          Vitals Value Taken Time   /61 08/31/20 1240   Temp 36.7 °C (98 °F) 08/31/20 1055   Pulse 70 08/31/20 1055   Resp 18 08/31/20 1055   SpO2 93 % 08/31/20 1055         Event Time   Out of Recovery 10:50:00         Pain/Analia Score: Pain Rating Prior to Med Admin: 4 (8/31/2020 10:40 AM)  Pain Rating Post Med Admin: 4 (8/31/2020 10:30 AM)  Analia Score: 10 (8/31/2020 10:50 AM)

## 2020-08-31 NOTE — TELEPHONE ENCOUNTER
----- Message from Saul Ferguson sent at 8/31/2020 12:59 PM CDT -----  Type:  Sooner Apoointment Request    Caller is requesting a sooner appointment.  Caller declined first available appointment listed below.  Caller will not accept being placed on the waitlist and is requesting a message be sent to doctor.    Name of Caller:  Betty/ [facility]  When is the first available appointment?  09/22  Symptoms:  Post op 2 weeks-Hernia  Best Call Back Number:  [phone]  Additional Information:

## 2020-08-31 NOTE — ANESTHESIA PROCEDURE NOTES
Intubation  Performed by: Eduardo Becerra CRNA  Authorized by: Eduardo Becerra CRNA     Intubation:     Attempted By:  CRNA    Difficult Airway Encountered?: No      Complications:  None    Airway Device:  Oral endotracheal tube    Airway Device Size:  7.5    Style/Cuff Inflation:  Cuffed    Inflation Amount (mL):  4    Tube secured:  21    Placement Verified By:  Capnometry    Complicating Factors:  None    Findings Post-Intubation:  BS equal bilateral

## 2020-08-31 NOTE — ANESTHESIA PREPROCEDURE EVALUATION
08/31/2020  Chen Levine is a 75 y.o., female.    Pre-op Assessment    I have reviewed the Patient Summary Reports.     I have reviewed the Nursing Notes. I have reviewed the NPO Status.   I have reviewed the Medications.     Review of Systems  Anesthesia Hx:  No problems with previous Anesthesia    Social:  Smoker    Hematology/Oncology:         -- Cancer in past history: Other (see Oncology comments) surgery    Cardiovascular:   Hypertension hyperlipidemia    Pulmonary:   Asthma    Renal/:   Chronic Renal Disease    Musculoskeletal:   Arthritis     Psych:   Psychiatric History anxiety depression                                                                                                                   08/31/2020  Chen Levine is a 75 y.o., female.    Anesthesia Evaluation    I have reviewed the Patient Summary Reports.    I have reviewed the Nursing Notes.      Review of Systems  Anesthesia Hx:  No problems with previous Anesthesia    Cardiovascular:   Hypertension    Pulmonary:   Asthma    Psych:   Psychiatric History          Physical Exam  General:  Well nourished, Morbid Obesity    Airway/Jaw/Neck:  Airway Findings: Mouth Opening: Normal Tongue: Normal  General Airway Assessment: Adult  Mallampati: IV  Improves to II with phonation.  TM Distance: Normal, at least 6 cm  Jaw/Neck Findings:  Neck ROM: Normal ROM     Eyes/Ears/Nose:  Eyes/Ears/Nose Findings:    Dental:  Dental Findings: Upper partial dentures   Chest/Lungs:  Chest/Lungs Findings: Normal Respiratory Rate     Heart/Vascular:  Heart Findings: Rate: Normal  Rhythm: Regular Rhythm        Mental Status:  Mental Status Findings:  Cooperative, Alert and Oriented         Anesthesia Plan  Type of Anesthesia, risks & benefits discussed:  Anesthesia Type:  general  Patient's Preference: General  Intra-op Monitoring Plan:    Intra-op Monitoring Plan Comments:   Post Op Pain Control Plan: multimodal analgesia and per primary service following discharge from PACU  Post Op Pain Control Plan Comments:   Induction:   IV  Beta Blocker:  Patient is not currently on a Beta-Blocker (No further documentation required).       Informed Consent: Patient understands risks and agrees with Anesthesia plan.  Questions answered. Anesthesia consent signed with patient.  ASA Score: 3     Day of Surgery Review of History & Physical:    H&P update referred to the surgeon.         Ready For Surgery From Anesthesia Perspective.         Physical Exam  General:  Well nourished, Morbid Obesity    Airway/Jaw/Neck:  Airway Findings: Mouth Opening: Normal Tongue: Normal  General Airway Assessment: Adult  Mallampati: IV  Improves to II with phonation.  TM Distance: Normal, at least 6 cm  Jaw/Neck Findings:  Neck ROM: Normal ROM     Eyes/Ears/Nose:  Eyes/Ears/Nose Findings:    Dental:  Dental Findings: Upper partial dentures   Chest/Lungs:  Chest/Lungs Findings: Normal Respiratory Rate     Heart/Vascular:  Heart Findings: Rate: Normal  Rhythm: Regular Rhythm        Mental Status:  Mental Status Findings:  Cooperative, Alert and Oriented         Anesthesia Plan  Type of Anesthesia, risks & benefits discussed:  Anesthesia Type:  general  Patient's Preference: General  Intra-op Monitoring Plan:   Intra-op Monitoring Plan Comments:   Post Op Pain Control Plan: multimodal analgesia, per primary service following discharge from PACU and IV/PO Opioids PRN  Post Op Pain Control Plan Comments:   Induction:   IV  Beta Blocker:  Patient is not currently on a Beta-Blocker (No further documentation required).       Informed Consent: Patient understands risks and agrees with Anesthesia plan.  Questions answered. Anesthesia consent signed with patient.  ASA Score: 3     Day of Surgery Review of History & Physical:    H&P update referred to the surgeon.         Ready For Surgery  From Anesthesia Perspective.

## 2020-08-31 NOTE — TELEPHONE ENCOUNTER
I called and spoke to Tita at [facility] to inform her that I scheduled patient on Tuesday, 9/15/20 at 12:30pm in [place]. Lionel

## 2020-09-03 DIAGNOSIS — I87.2 VENOUS STASIS DERMATITIS, UNSPECIFIED LATERALITY: ICD-10-CM

## 2020-09-04 RX ORDER — CLOBETASOL PROPIONATE 0.5 MG/G
CREAM TOPICAL
Qty: 60 G | Refills: 0 | Status: SHIPPED | OUTPATIENT
Start: 2020-09-04

## 2020-09-17 ENCOUNTER — OFFICE VISIT (OUTPATIENT)
Dept: SURGERY | Facility: CLINIC | Age: 75
End: 2020-09-17
Payer: MEDICARE

## 2020-09-17 VITALS — BODY MASS INDEX: 39.48 KG/M2 | WEIGHT: 215.81 LBS

## 2020-09-17 DIAGNOSIS — Z98.890 POST-OPERATIVE STATE: Primary | ICD-10-CM

## 2020-09-17 PROCEDURE — 99024 POSTOP FOLLOW-UP VISIT: CPT | Mod: S$GLB,,, | Performed by: PHYSICIAN ASSISTANT

## 2020-09-17 PROCEDURE — 99024 PR POST-OP FOLLOW-UP VISIT: ICD-10-PCS | Mod: S$GLB,,, | Performed by: PHYSICIAN ASSISTANT

## 2020-09-17 PROCEDURE — 99999 PR PBB SHADOW E&M-EST. PATIENT-LVL III: CPT | Mod: PBBFAC,,, | Performed by: PHYSICIAN ASSISTANT

## 2020-09-17 PROCEDURE — 99999 PR PBB SHADOW E&M-EST. PATIENT-LVL III: ICD-10-PCS | Mod: PBBFAC,,, | Performed by: PHYSICIAN ASSISTANT

## 2020-09-17 NOTE — PROGRESS NOTES
Chen Levine is status post robotic repair of ventral incisional hernia and presents today for follow-up care.  She is doing well and has no complaints. Her pain is gradually improving. She notes that its worse when she is more active. She is moving right now so is pushing herself a little to much.     PE: Abdomen is soft, mildly tender, non-distended.  Incisions clean, dry, and intact.        A/P:  Normal post-operative course.    The patient is instructed to avoid heavy lifting or strenuous activity until 6 weeks after the surgery date.  Return to clinic as needed.

## 2020-12-22 PROCEDURE — 99285 EMERGENCY DEPT VISIT HI MDM: CPT | Mod: 25

## 2020-12-23 ENCOUNTER — OUTSIDE PLACE OF SERVICE (OUTPATIENT)
Dept: INFECTIOUS DISEASES | Facility: CLINIC | Age: 75
End: 2020-12-23
Payer: MEDICARE

## 2020-12-23 ENCOUNTER — HOSPITAL ENCOUNTER (OUTPATIENT)
Facility: HOSPITAL | Age: 75
Discharge: HOME | End: 2020-12-24
Attending: EMERGENCY MEDICINE | Admitting: HOSPITALIST
Payer: MEDICARE

## 2020-12-23 DIAGNOSIS — R07.9 CHEST PAIN: ICD-10-CM

## 2020-12-23 DIAGNOSIS — M79.606 LEG PAIN: ICD-10-CM

## 2020-12-23 DIAGNOSIS — I87.8 CHRONIC VENOUS STASIS: ICD-10-CM

## 2020-12-23 DIAGNOSIS — I87.2 VENOUS INSUFFICIENCY: ICD-10-CM

## 2020-12-23 DIAGNOSIS — L03.119 CELLULITIS OF LOWER EXTREMITY, UNSPECIFIED LATERALITY: Primary | ICD-10-CM

## 2020-12-23 DIAGNOSIS — L03.119 CELLULITIS OF LOWER EXTREMITY: ICD-10-CM

## 2020-12-23 PROBLEM — E03.9 HYPOTHYROID: Status: ACTIVE | Noted: 2020-12-23

## 2020-12-23 PROBLEM — E11.9 TYPE 2 DIABETES MELLITUS WITHOUT COMPLICATION, WITHOUT LONG-TERM CURRENT USE OF INSULIN: Status: ACTIVE | Noted: 2017-07-05

## 2020-12-23 LAB
ALBUMIN SERPL BCP-MCNC: 3.4 G/DL (ref 3.5–5.2)
ALP SERPL-CCNC: 113 U/L (ref 55–135)
ALT SERPL W/O P-5'-P-CCNC: 9 U/L (ref 10–44)
ANION GAP SERPL CALC-SCNC: 11 MMOL/L (ref 8–16)
APTT BLDCRRT: 32.9 SEC (ref 21–32)
AST SERPL-CCNC: 15 U/L (ref 10–40)
BASOPHILS # BLD AUTO: 0.09 K/UL (ref 0–0.2)
BASOPHILS NFR BLD: 0.7 % (ref 0–1.9)
BILIRUB SERPL-MCNC: 0.2 MG/DL (ref 0.1–1)
BUN SERPL-MCNC: 16 MG/DL (ref 8–23)
C3 SERPL-MCNC: 127 MG/DL (ref 50–180)
C4 SERPL-MCNC: 23 MG/DL (ref 11–44)
CALCIUM SERPL-MCNC: 8.7 MG/DL (ref 8.7–10.5)
CHLORIDE SERPL-SCNC: 102 MMOL/L (ref 95–110)
CK SERPL-CCNC: 108 U/L (ref 20–180)
CO2 SERPL-SCNC: 24 MMOL/L (ref 23–29)
CREAT SERPL-MCNC: 1.2 MG/DL (ref 0.5–1.4)
CRP SERPL-MCNC: 45.9 MG/L (ref 0–8.2)
DIFFERENTIAL METHOD: ABNORMAL
EOSINOPHIL # BLD AUTO: 0.2 K/UL (ref 0–0.5)
EOSINOPHIL NFR BLD: 1.5 % (ref 0–8)
ERYTHROCYTE [DISTWIDTH] IN BLOOD BY AUTOMATED COUNT: 16.1 % (ref 11.5–14.5)
ERYTHROCYTE [SEDIMENTATION RATE] IN BLOOD BY WESTERGREN METHOD: 51 MM/HR (ref 0–20)
EST. GFR  (AFRICAN AMERICAN): 51 ML/MIN/1.73 M^2
EST. GFR  (NON AFRICAN AMERICAN): 45 ML/MIN/1.73 M^2
GLUCOSE SERPL-MCNC: 106 MG/DL (ref 70–110)
HCT VFR BLD AUTO: 41 % (ref 37–48.5)
HGB BLD-MCNC: 13 G/DL (ref 12–16)
IMM GRANULOCYTES # BLD AUTO: 0.05 K/UL (ref 0–0.04)
IMM GRANULOCYTES NFR BLD AUTO: 0.4 % (ref 0–0.5)
INR PPP: 1 (ref 0.8–1.2)
LACTATE SERPL-SCNC: 1 MMOL/L (ref 0.5–2.2)
LACTATE SERPL-SCNC: 1.2 MMOL/L (ref 0.5–2.2)
LYMPHOCYTES # BLD AUTO: 2.6 K/UL (ref 1–4.8)
LYMPHOCYTES NFR BLD: 19.3 % (ref 18–48)
MCH RBC QN AUTO: 26.3 PG (ref 27–31)
MCHC RBC AUTO-ENTMCNC: 31.7 G/DL (ref 32–36)
MCV RBC AUTO: 83 FL (ref 82–98)
MONOCYTES # BLD AUTO: 1.4 K/UL (ref 0.3–1)
MONOCYTES NFR BLD: 10.1 % (ref 4–15)
NEUTROPHILS # BLD AUTO: 9.3 K/UL (ref 1.8–7.7)
NEUTROPHILS NFR BLD: 68 % (ref 38–73)
NRBC BLD-RTO: 0 /100 WBC
PLATELET # BLD AUTO: 468 K/UL (ref 150–350)
PMV BLD AUTO: 9.3 FL (ref 9.2–12.9)
POCT GLUCOSE: 94 MG/DL (ref 70–110)
POCT GLUCOSE: 95 MG/DL (ref 70–110)
POTASSIUM SERPL-SCNC: 4.3 MMOL/L (ref 3.5–5.1)
PROT SERPL-MCNC: 8.6 G/DL (ref 6–8.4)
PROTHROMBIN TIME: 10.7 SEC (ref 9–12.5)
RBC # BLD AUTO: 4.94 M/UL (ref 4–5.4)
SARS-COV-2 RDRP RESP QL NAA+PROBE: NEGATIVE
SODIUM SERPL-SCNC: 137 MMOL/L (ref 136–145)
WBC # BLD AUTO: 13.67 K/UL (ref 3.9–12.7)

## 2020-12-23 PROCEDURE — 87077 CULTURE AEROBIC IDENTIFY: CPT

## 2020-12-23 PROCEDURE — 86038 ANTINUCLEAR ANTIBODIES: CPT

## 2020-12-23 PROCEDURE — 85025 COMPLETE CBC W/AUTO DIFF WBC: CPT

## 2020-12-23 PROCEDURE — 97802 MEDICAL NUTRITION INDIV IN: CPT

## 2020-12-23 PROCEDURE — 25000003 PHARM REV CODE 250: Performed by: HOSPITALIST

## 2020-12-23 PROCEDURE — 96375 TX/PRO/DX INJ NEW DRUG ADDON: CPT

## 2020-12-23 PROCEDURE — 86235 NUCLEAR ANTIGEN ANTIBODY: CPT | Mod: 59

## 2020-12-23 PROCEDURE — 96365 THER/PROPH/DIAG IV INF INIT: CPT

## 2020-12-23 PROCEDURE — 85730 THROMBOPLASTIN TIME PARTIAL: CPT

## 2020-12-23 PROCEDURE — 86255 FLUORESCENT ANTIBODY SCREEN: CPT | Mod: 91

## 2020-12-23 PROCEDURE — 86039 ANTINUCLEAR ANTIBODIES (ANA): CPT

## 2020-12-23 PROCEDURE — 87070 CULTURE OTHR SPECIMN AEROBIC: CPT | Mod: 59

## 2020-12-23 PROCEDURE — 87186 SC STD MICRODIL/AGAR DIL: CPT

## 2020-12-23 PROCEDURE — 82550 ASSAY OF CK (CPK): CPT

## 2020-12-23 PROCEDURE — 25000003 PHARM REV CODE 250: Performed by: NURSE PRACTITIONER

## 2020-12-23 PROCEDURE — 99222 PR INITIAL HOSPITAL CARE,LEVL II: ICD-10-PCS | Mod: S$GLB,,, | Performed by: INTERNAL MEDICINE

## 2020-12-23 PROCEDURE — 82962 GLUCOSE BLOOD TEST: CPT

## 2020-12-23 PROCEDURE — 85651 RBC SED RATE NONAUTOMATED: CPT

## 2020-12-23 PROCEDURE — 80053 COMPREHEN METABOLIC PANEL: CPT

## 2020-12-23 PROCEDURE — 63600175 PHARM REV CODE 636 W HCPCS: Performed by: NURSE PRACTITIONER

## 2020-12-23 PROCEDURE — G0378 HOSPITAL OBSERVATION PER HR: HCPCS

## 2020-12-23 PROCEDURE — U0002 COVID-19 LAB TEST NON-CDC: HCPCS

## 2020-12-23 PROCEDURE — 86140 C-REACTIVE PROTEIN: CPT

## 2020-12-23 PROCEDURE — 86160 COMPLEMENT ANTIGEN: CPT

## 2020-12-23 PROCEDURE — 36415 COLL VENOUS BLD VENIPUNCTURE: CPT

## 2020-12-23 PROCEDURE — 63600175 PHARM REV CODE 636 W HCPCS: Performed by: EMERGENCY MEDICINE

## 2020-12-23 PROCEDURE — 25000003 PHARM REV CODE 250: Performed by: EMERGENCY MEDICINE

## 2020-12-23 PROCEDURE — 96376 TX/PRO/DX INJ SAME DRUG ADON: CPT

## 2020-12-23 PROCEDURE — 99222 1ST HOSP IP/OBS MODERATE 55: CPT | Mod: S$GLB,,, | Performed by: INTERNAL MEDICINE

## 2020-12-23 PROCEDURE — 87040 BLOOD CULTURE FOR BACTERIA: CPT | Mod: 59

## 2020-12-23 PROCEDURE — 86160 COMPLEMENT ANTIGEN: CPT | Mod: 59

## 2020-12-23 PROCEDURE — 85610 PROTHROMBIN TIME: CPT

## 2020-12-23 PROCEDURE — 83605 ASSAY OF LACTIC ACID: CPT

## 2020-12-23 RX ORDER — LEVOTHYROXINE SODIUM 50 UG/1
50 TABLET ORAL
Status: DISCONTINUED | OUTPATIENT
Start: 2020-12-23 | End: 2020-12-24 | Stop reason: HOSPADM

## 2020-12-23 RX ORDER — MORPHINE SULFATE 4 MG/ML
4 INJECTION, SOLUTION INTRAMUSCULAR; INTRAVENOUS ONCE
Status: COMPLETED | OUTPATIENT
Start: 2020-12-23 | End: 2020-12-23

## 2020-12-23 RX ORDER — SODIUM CHLORIDE 0.9 % (FLUSH) 0.9 %
10 SYRINGE (ML) INJECTION
Status: DISCONTINUED | OUTPATIENT
Start: 2020-12-23 | End: 2020-12-24 | Stop reason: HOSPADM

## 2020-12-23 RX ORDER — INSULIN ASPART 100 [IU]/ML
0-5 INJECTION, SOLUTION INTRAVENOUS; SUBCUTANEOUS
Status: DISCONTINUED | OUTPATIENT
Start: 2020-12-23 | End: 2020-12-23

## 2020-12-23 RX ORDER — GABAPENTIN 300 MG/1
600 CAPSULE ORAL 2 TIMES DAILY
Status: DISCONTINUED | OUTPATIENT
Start: 2020-12-23 | End: 2020-12-24 | Stop reason: HOSPADM

## 2020-12-23 RX ORDER — TALC
6 POWDER (GRAM) TOPICAL NIGHTLY PRN
Status: DISCONTINUED | OUTPATIENT
Start: 2020-12-23 | End: 2020-12-24 | Stop reason: HOSPADM

## 2020-12-23 RX ORDER — CLINDAMYCIN PHOSPHATE 600 MG/50ML
600 INJECTION, SOLUTION INTRAVENOUS
Status: DISCONTINUED | OUTPATIENT
Start: 2020-12-23 | End: 2020-12-24 | Stop reason: HOSPADM

## 2020-12-23 RX ORDER — KETOROLAC TROMETHAMINE 30 MG/ML
15 INJECTION, SOLUTION INTRAMUSCULAR; INTRAVENOUS ONCE
Status: COMPLETED | OUTPATIENT
Start: 2020-12-23 | End: 2020-12-23

## 2020-12-23 RX ORDER — ESCITALOPRAM OXALATE 5 MG/1
5 TABLET ORAL NIGHTLY
Status: DISCONTINUED | OUTPATIENT
Start: 2020-12-23 | End: 2020-12-24 | Stop reason: HOSPADM

## 2020-12-23 RX ORDER — DILTIAZEM HYDROCHLORIDE 30 MG/1
90 TABLET, FILM COATED ORAL 2 TIMES DAILY
Status: DISCONTINUED | OUTPATIENT
Start: 2020-12-23 | End: 2020-12-24 | Stop reason: HOSPADM

## 2020-12-23 RX ORDER — ATORVASTATIN CALCIUM 20 MG/1
20 TABLET, FILM COATED ORAL DAILY
Status: DISCONTINUED | OUTPATIENT
Start: 2020-12-23 | End: 2020-12-24 | Stop reason: HOSPADM

## 2020-12-23 RX ORDER — DIPHENHYDRAMINE HCL 25 MG
25 CAPSULE ORAL EVERY 6 HOURS PRN
Status: DISCONTINUED | OUTPATIENT
Start: 2020-12-23 | End: 2020-12-24 | Stop reason: HOSPADM

## 2020-12-23 RX ORDER — IBUPROFEN 200 MG
16 TABLET ORAL
Status: DISCONTINUED | OUTPATIENT
Start: 2020-12-23 | End: 2020-12-23

## 2020-12-23 RX ORDER — LANOLIN ALCOHOL/MO/W.PET/CERES
800 CREAM (GRAM) TOPICAL
Status: DISCONTINUED | OUTPATIENT
Start: 2020-12-23 | End: 2020-12-24 | Stop reason: HOSPADM

## 2020-12-23 RX ORDER — GLUCAGON 1 MG
1 KIT INJECTION
Status: DISCONTINUED | OUTPATIENT
Start: 2020-12-23 | End: 2020-12-23

## 2020-12-23 RX ORDER — FUROSEMIDE 40 MG/1
40 TABLET ORAL DAILY
Status: DISCONTINUED | OUTPATIENT
Start: 2020-12-23 | End: 2020-12-23

## 2020-12-23 RX ORDER — CLINDAMYCIN PHOSPHATE 600 MG/50ML
600 INJECTION, SOLUTION INTRAVENOUS
Status: COMPLETED | OUTPATIENT
Start: 2020-12-23 | End: 2020-12-23

## 2020-12-23 RX ORDER — ACETAMINOPHEN 325 MG/1
650 TABLET ORAL EVERY 6 HOURS PRN
Status: DISCONTINUED | OUTPATIENT
Start: 2020-12-23 | End: 2020-12-24 | Stop reason: HOSPADM

## 2020-12-23 RX ORDER — ENOXAPARIN SODIUM 100 MG/ML
40 INJECTION SUBCUTANEOUS EVERY 24 HOURS
Status: DISCONTINUED | OUTPATIENT
Start: 2020-12-23 | End: 2020-12-23

## 2020-12-23 RX ORDER — HYDROCODONE BITARTRATE AND ACETAMINOPHEN 5; 325 MG/1; MG/1
1 TABLET ORAL EVERY 6 HOURS PRN
Status: DISCONTINUED | OUTPATIENT
Start: 2020-12-23 | End: 2020-12-24 | Stop reason: HOSPADM

## 2020-12-23 RX ORDER — IBUPROFEN 200 MG
24 TABLET ORAL
Status: DISCONTINUED | OUTPATIENT
Start: 2020-12-23 | End: 2020-12-23

## 2020-12-23 RX ORDER — AMOXICILLIN 250 MG
1 CAPSULE ORAL DAILY
Status: DISCONTINUED | OUTPATIENT
Start: 2020-12-23 | End: 2020-12-24 | Stop reason: HOSPADM

## 2020-12-23 RX ORDER — ONDANSETRON 2 MG/ML
4 INJECTION INTRAMUSCULAR; INTRAVENOUS EVERY 8 HOURS PRN
Status: DISCONTINUED | OUTPATIENT
Start: 2020-12-23 | End: 2020-12-24 | Stop reason: HOSPADM

## 2020-12-23 RX ORDER — MORPHINE SULFATE 2 MG/ML
6 INJECTION, SOLUTION INTRAMUSCULAR; INTRAVENOUS
Status: COMPLETED | OUTPATIENT
Start: 2020-12-23 | End: 2020-12-23

## 2020-12-23 RX ORDER — MORPHINE SULFATE 2 MG/ML
2 INJECTION, SOLUTION INTRAMUSCULAR; INTRAVENOUS EVERY 4 HOURS PRN
Status: DISCONTINUED | OUTPATIENT
Start: 2020-12-23 | End: 2020-12-24 | Stop reason: HOSPADM

## 2020-12-23 RX ADMIN — MORPHINE SULFATE 4 MG: 4 INJECTION INTRAVENOUS at 06:12

## 2020-12-23 RX ADMIN — ATORVASTATIN CALCIUM 20 MG: 20 TABLET, FILM COATED ORAL at 04:12

## 2020-12-23 RX ADMIN — KETOROLAC TROMETHAMINE 15 MG: 30 INJECTION, SOLUTION INTRAMUSCULAR at 01:12

## 2020-12-23 RX ADMIN — ONDANSETRON 4 MG: 2 INJECTION INTRAMUSCULAR; INTRAVENOUS at 09:12

## 2020-12-23 RX ADMIN — DOCUSATE SODIUM - SENNOSIDES 1 TABLET: 50; 8.6 TABLET, FILM COATED ORAL at 10:12

## 2020-12-23 RX ADMIN — DIPHENHYDRAMINE HYDROCHLORIDE 25 MG: 25 CAPSULE ORAL at 06:12

## 2020-12-23 RX ADMIN — GABAPENTIN 600 MG: 300 CAPSULE ORAL at 10:12

## 2020-12-23 RX ADMIN — MORPHINE SULFATE 6 MG: 2 INJECTION, SOLUTION INTRAMUSCULAR; INTRAVENOUS at 02:12

## 2020-12-23 RX ADMIN — GABAPENTIN 600 MG: 300 CAPSULE ORAL at 09:12

## 2020-12-23 RX ADMIN — CLINDAMYCIN IN 5 PERCENT DEXTROSE 600 MG: 12 INJECTION, SOLUTION INTRAVENOUS at 05:12

## 2020-12-23 RX ADMIN — DILTIAZEM HYDROCHLORIDE 90 MG: 30 TABLET, FILM COATED ORAL at 09:12

## 2020-12-23 RX ADMIN — DILTIAZEM HYDROCHLORIDE 90 MG: 30 TABLET, FILM COATED ORAL at 10:12

## 2020-12-23 RX ADMIN — HYDROCODONE BITARTRATE AND ACETAMINOPHEN 1 TABLET: 5; 325 TABLET ORAL at 04:12

## 2020-12-23 RX ADMIN — CLINDAMYCIN IN 5 PERCENT DEXTROSE 600 MG: 12 INJECTION, SOLUTION INTRAVENOUS at 02:12

## 2020-12-23 RX ADMIN — LEVOTHYROXINE SODIUM 50 MCG: 0.05 TABLET ORAL at 10:12

## 2020-12-23 RX ADMIN — CLINDAMYCIN IN 5 PERCENT DEXTROSE 600 MG: 12 INJECTION, SOLUTION INTRAVENOUS at 10:12

## 2020-12-23 RX ADMIN — ESCITALOPRAM OXALATE 5 MG: 5 TABLET, FILM COATED ORAL at 09:12

## 2020-12-23 NOTE — ASSESSMENT & PLAN NOTE
Chronic. Elevate legs.  Can consider wrapping legs or wearing compression stockings but unlikely to tolerate now given pain.

## 2020-12-23 NOTE — ASSESSMENT & PLAN NOTE
Patient's FSGs are controlled on current hypoglycemics.   Last A1c reviewed-   Lab Results   Component Value Date    HGBA1C 6.0 (H) 08/28/2020     Most recent fingerstick glucose reviewed-   Recent Labs   Lab 12/23/20  0043   POCTGLUCOSE 94     Current correctional scale  Low  Maintain anti-hyperglycemic dose as follows-   Antihyperglycemics (From admission, onward)    Start     Stop Route Frequency Ordered    12/23/20 0629  insulin aspart U-100 pen 0-5 Units      -- SubQ Before meals & nightly PRN 12/23/20 0629       Accu-Cheks a.c./HS.  Diabetic diet.

## 2020-12-23 NOTE — ED NOTES
"Chen Levine presents to the ED with c/o ulcerations to bilateral lower extremities that started 4 years ago. Daughter reports that "The scabbed areas have been there for years." But reports that the redness is new and extended to her thighs. Patient always has pain in her legs, but this pain is worse than usual. Patient appears to be uncomfortable. Subjective fever. "We did not have a thermometer." AAOx4. Patient lives with daughter. Bilateral lower extremity swelling. Patient does not use any devices to ambulate. Mucous membranes are pink and moist. Skin is warm, dry and intact. Lungs are clear bilaterally, respirations are regular and unlabored. Denies SOB, cough, congestion or rhinorrhea. BS active x4, no tenderness with palpation, abd is soft and not distended. Denies any appetite or activity change. S1S2, capillary refill is < 2 seconds. Denies dysuria, difficulty urinating, frequency, numbness, tingling or weakness. JADE SABAS    "

## 2020-12-23 NOTE — PLAN OF CARE
Intervention: nutrition education and carbohydrate/fat/sodium modified diet     Recommendation:   1) Continue DM 2000 kcal, cardiac diet ( 4 carb servings/meal)   2) weigh pt weekly   3) Nutrition education and handouts given    Goals: 1) PO intakes > 75% of meals at f/u  Nutrition Goal Status: new  Communication of RD Recs: (POC,  sticky note)

## 2020-12-23 NOTE — CONSULTS
Consult Note  Infectious Disease    Reason for Consult:      HPI: Chen Levine is a   75 y.o. female who is well known to me from outpatient ID consultations for pyoderma toes skin lesions on both pretibial areas.  She was last seen by me 5/6(please see that note), at which time I referred her to ID at [facility](who felt that the skin lesions were not of infectious origin) who referred her to dermatology, who has been treating her for venous stasis(last seen 8/26/20, as she states that physician left). No additional cultures or biopsies taken. ?diagnosis of necrobiosis lipoidica diabeticorum? Also given trenal and niacinamide, and topical clobetasol.  She did not return to ID at [facility] for follow up. Has not seen her oncologist in some time either but CT scans for surveillance were negative for recurrence 6/30/20. She underwent ventral hernia repair.  She presented to the ED overnight with complains of a painful lower leg lesions and a  rash to both legs beginning yesterday, with subjective fever. The rash is not painful as suggested in prior notes, but her chronic tibial lesions are.  AF in ED. . Usual WBC of 13k, left shift(negative flow cytometry 5/6/20). ESR 51, elevated platelets, CRP 46. US BLE negative for DVT.  She denies any new food, skin care product,detergent, medication, supplement. She is not itching. She does not take any anticoagulant.       Review of patient's allergies indicates:   Allergen Reactions    Penicillins Itching and Rash    Promethazine Itching and Rash    Sulfa (sulfonamide antibiotics) Itching and Rash     Past Medical History:   Diagnosis Date    Anxiety     Asthma     childhood currently resolved    Borderline diabetes     Cancer 1984    bladder    Chronic venous stasis     Colon polyps     Deep venous thrombosis 1970'S    In the leg    Degenerative disc disease at L5-S1 level     Depression     Hyperlipidemia     Hypertension     Obesity     Overactive bladder      Pyoderma [id number]    No histological diagnosis    Renal cell cancer, right 09/2019    R nephrostomy    Wears partial dentures     UPPER PARTIAL     Past Surgical History:   Procedure Laterality Date    BLADDER SURGERY  1985    c-spine fusion      EPIDURAL STEROID INJECTION  2019    x2     HYSTERECTOMY  1977    ROBOT-ASSISTED LAPAROSCOPIC NEPHRECTOMY Right 9/18/2019    Procedure: ROBOTIC NEPHRECTOMY;  Surgeon: Manny Mondragon MD;  Location: [facility];  Service: Urology;  Laterality: Right;    ROBOT-ASSISTED LAPAROSCOPIC REPAIR OF INCISIONAL HERNIA N/A 8/31/2020    Procedure: ROBOTIC REPAIR, HERNIA, INCISIONAL;  Surgeon: Agusto Jaramillo MD;  Location: [facility] OR;  Service: General;  Laterality: N/A;    SPINE SURGERY  1987     Social History     Socioeconomic History    Marital status:      Spouse name: Not on file    Number of children: Not on file    Years of education: Not on file    Highest education level: Not on file   Occupational History    Not on file   Social Needs    Financial resource strain: Not on file    Food insecurity     Worry: Not on file     Inability: Not on file    Transportation needs     Medical: Not on file     Non-medical: Not on file   Tobacco Use    Smoking status: Current Every Day Smoker     Packs/day: 0.75     Types: Cigarettes    Smokeless tobacco: Never Used   Substance and Sexual Activity    Alcohol use: No    Drug use: No    Sexual activity: Not Currently   Lifestyle    Physical activity     Days per week: Not on file     Minutes per session: Not on file    Stress: Not on file   Relationships    Social connections     Talks on phone: Not on file     Gets together: Not on file     Attends [employer] service: Not on file     Active member of club or organization: Not on file     Attends meetings of clubs or organizations: Not on file     Relationship status: Not on file   Other Topics Concern    Are you pregnant or think you may be? Not Asked     Breast-feeding Not Asked   Social History Narrative    Not on file     Family History   Problem Relation Age of Onset    Cancer Mother     Vision loss Mother     Hypertension Father     Stroke Father        Pertinent medications noted:     Review of Systems:   subjective, fever,   No pain in eyes, or lids, no rash or exudate around eyes.   No pain in mouth or throat. No problems with teeth, gums.  No chest pain,    No nausea, vomiting, diarrhea,     No swelling of joints, redness of joints, injuries,    No  , numbness, paresthesias, neuropathy,      Blood sugars are well controlled  No bleeding, lymphadenopathy, anemia,    No new rashes, lesions, or wounds  No TB exposure  No recent/prior systemic steroids        EXAM & DIAGNOSTICS REVIEWED:   Vitals:     Temp:  [96.2 °F (35.7 °C)-98.5 °F (36.9 °C)]   Temp: 97.3 °F (36.3 °C) (12/23/20 1603)  Pulse: 63 (12/23/20 1603)  Resp: 16 (12/23/20 1635)  BP: (!) 117/55 (12/23/20 1603)  SpO2: (!) 93 % (12/23/20 1603)    Intake/Output Summary (Last 24 hours) at 12/23/2020 1752  Last data filed at 12/23/2020 0310  Gross per 24 hour   Intake 50 ml   Output --   Net 50 ml       General:  In NAD. Alert and attentive, cooperative, comfortable  Eyes:  Anicteric, PERRL, EOMI, no scleral or conjunctival injection  ENT:  No ulcers, exudates, thrush, nares patent,    Neck:  Supple,      Abd:  Soft, obese, NT, ND, normal BS, no masses or organomegaly appreciated.  :  Voids  no flank tenderness  Musc:  Joints without effusion, swelling, erythema, synovitis, muscle wasting.   Skin:        The rash is no worse than photos earlier today.  Non blanching erythema, patchy, allergic reaction pattern.       Much of the above scales are off and there are numerous fluctuant masses which, when palpated , express purulent vs sebaceous type exudate. Non foul smelling.  I did obtain a culture.     These lesions are identical to 2017 and early 2020   She has lesions on her great toes as well, which  she attributes to the friction from a pair of shoes.    Neuro:              Alert, attentive, speech fluent, face symmetric, moves all extremities, no focal weakness. Ambulatory  Psych:  Calm, cooperative     Extrem: No pitting edema,  , phlebitis, cellulitis, warm and well perfused  VAD:       Isolation:      Lines/Tubes/Drains:    General Labs reviewed:  Recent Labs   Lab 12/23/20 0051   WBC 13.67*   HGB 13.0   HCT 41.0   *       Recent Labs   Lab 12/23/20 0051      K 4.3      CO2 24   BUN 16   CREATININE 1.2   CALCIUM 8.7   PROT 8.6*   BILITOT 0.2   ALKPHOS 113   ALT 9*   AST 15           Micro:  Microbiology Results (last 7 days)     Procedure Component Value Units Date/Time    Blood culture x two cultures. Draw prior to antibiotics. [[id number]] Collected: 12/23/20 0206    Order Status: Sent Specimen: Blood Updated: 12/23/20 1115    Blood culture x two cultures. Draw prior to antibiotics. [[id number]] Collected: 12/23/20 0206    Order Status: Sent Specimen: Blood Updated: 12/23/20 1115        Imaging Reviewed:   US  Cardiology:    IMPRESSION & PLAN   1. Vasculitic rash, etiology unclear, could be allergic   Asymptomatic really  2. Pyodermatous lesions on LE have been active all year. Previous biopsies did not support infectious cause but they have been secondarily infected in the past. They are identical to prior lesions. Cultures of AFB and fungi were negative. She has been under the care of dermatology until 8/26.    ?diabetic dermopathy   ?paraneoplastic lesion   ?sequela of some other inflammatory process    3. History of venous stasis, with plans to see vascular surgery  4. History of renal cell carcinoma with recent CT scans negative for disease(june)  5. smoker      Recommendations:  Vasculitis work up(I added cryoglobulins)  If rash is worse tomorrow, start steroids  Continue clindamycin (po is ok) for anterior tibial lesions  Needs to establish with dermatology again  No objection to  discharge 12/24  I submitted a culture from tibial lesion      Medical Decision Making during this encounter was  [_] Low Complexity  [_] Moderate Complexity  [  ] High Complexity

## 2020-12-23 NOTE — SUBJECTIVE & OBJECTIVE
Past Medical History:   Diagnosis Date    Anxiety     Asthma     childhood currently resolved    Borderline diabetes     Cancer 1984    bladder    Chronic venous stasis     Colon polyps     Deep venous thrombosis 1970'S    In the leg    Degenerative disc disease at L5-S1 level     Depression     Hyperlipidemia     Hypertension     Obesity     Overactive bladder     Pyoderma [id number]    No histological diagnosis    Renal cell cancer, right 09/2019    R nephrostomy    Wears partial dentures     UPPER PARTIAL       Past Surgical History:   Procedure Laterality Date    BLADDER SURGERY  1985    c-spine fusion      EPIDURAL STEROID INJECTION  2019    x2     HYSTERECTOMY  1977    ROBOT-ASSISTED LAPAROSCOPIC NEPHRECTOMY Right 9/18/2019    Procedure: ROBOTIC NEPHRECTOMY;  Surgeon: Manny Mondragon MD;  Location: [facility] OR;  Service: Urology;  Laterality: Right;    ROBOT-ASSISTED LAPAROSCOPIC REPAIR OF INCISIONAL HERNIA N/A 8/31/2020    Procedure: ROBOTIC REPAIR, HERNIA, INCISIONAL;  Surgeon: Agsuto Jaramillo MD;  Location: [facility] OR;  Service: General;  Laterality: N/A;    SPINE SURGERY  1987       Review of patient's allergies indicates:   Allergen Reactions    Penicillins Itching and Rash    Promethazine Itching and Rash    Sulfa (sulfonamide antibiotics) Itching and Rash       No current facility-administered medications on file prior to encounter.      Current Outpatient Medications on File Prior to Encounter   Medication Sig    acetaminophen (TYLENOL) 325 MG tablet Take 2 tablets (650 mg total) by mouth every 6 (six) hours as needed for Pain.    atorvastatin (LIPITOR) 20 MG tablet Take 1 tablet (20 mg total) by mouth Daily.    clobetasoL (TEMOVATE) 0.05 % cream APPLY  CREAM TOPICALLY TO AFFECTED AREA TWICE DAILY    diltiaZEM (CARDIZEM) 90 MG tablet Take 1 tablet (90 mg total) by mouth 2 (two) times daily.    escitalopram oxalate (LEXAPRO) 5 MG Tab Take 1 tablet (5 mg total) by mouth once  daily. (Patient taking differently: Take 5 mg by mouth nightly. )    flurazepam (DALMANE) 15 MG Cap TAKE 1 TO 2 CAPSULES BY MOUTH AT BEDTIME AS NEEDED FOR SLEEP    furosemide (LASIX) 40 MG tablet Take 1 tablet (40 mg total) by mouth 3 (three) times a week. (Patient taking differently: Take 40 mg by mouth once daily. )    gabapentin (NEURONTIN) 600 MG tablet Take 600 mg by mouth 2 (two) times daily.     HYDROcodone-acetaminophen (NORCO) 5-325 mg per tablet Take 1 tablet by mouth every 6 (six) hours as needed for Pain.    levothyroxine (SYNTHROID) 50 MCG tablet before breakfast.     pentoxifylline (TRENTAL) 400 mg TbSR 1 po tid    pyridoxine, vitamin B6, (VITAMIN B-6) 100 MG Tab Take 50 mg by mouth once daily.     Family History     Problem Relation (Age of Onset)    Cancer Mother    Hypertension Father    Stroke Father    Vision loss Mother        Tobacco Use    Smoking status: Current Every Day Smoker     Packs/day: 0.75     Types: Cigarettes    Smokeless tobacco: Never Used   Substance and Sexual Activity    Alcohol use: No    Drug use: No    Sexual activity: Not Currently     Review of Systems   Constitutional: Positive for chills and fever (Subjective). Negative for activity change, appetite change, diaphoresis and fatigue.   HENT: Negative for congestion, postnasal drip, rhinorrhea and sore throat.    Eyes: Negative for photophobia and visual disturbance.   Respiratory: Negative for cough, chest tightness, shortness of breath and wheezing.    Cardiovascular: Negative for chest pain, palpitations and leg swelling.   Gastrointestinal: Negative for abdominal distention, abdominal pain, diarrhea, nausea and vomiting.   Genitourinary: Negative for dysuria, flank pain, frequency and hematuria.   Musculoskeletal: Positive for myalgias. Negative for arthralgias, gait problem and neck pain.   Skin: Positive for rash. Negative for color change, pallor and wound.   Neurological: Negative for dizziness, syncope,  weakness, numbness and headaches.   Psychiatric/Behavioral: Negative for agitation, confusion and hallucinations. The patient is not nervous/anxious.      Objective:     Vital Signs (Most Recent):  Temp: 98.5 °F (36.9 °C) (12/22/20 2203)  Pulse: 73 (12/23/20 0302)  Resp: 16 (12/23/20 0612)  BP: (!) 128/58 (12/23/20 0302)  SpO2: 95 % (12/23/20 0302) Vital Signs (24h Range):  Temp:  [98.5 °F (36.9 °C)] 98.5 °F (36.9 °C)  Pulse:  [73-77] 73  Resp:  [16-24] 16  SpO2:  [95 %-97 %] 95 %  BP: (128-183)/(58-92) 128/58     Weight: 97.1 kg (214 lb)  Body mass index is 39.14 kg/m².    Physical Exam  Vitals signs and nursing note reviewed.   Constitutional:       General: She is in acute distress (Appears to be in pain).      Appearance: She is well-developed. She is obese. She is not toxic-appearing or diaphoretic.   HENT:      Head: Normocephalic and atraumatic.      Right Ear: External ear normal.      Left Ear: External ear normal.      Mouth/Throat:      Mouth: Mucous membranes are moist.   Eyes:      Pupils: Pupils are equal, round, and reactive to light.   Neck:      Musculoskeletal: Normal range of motion.      Vascular: No JVD.   Cardiovascular:      Rate and Rhythm: Normal rate and regular rhythm.      Heart sounds: No murmur.   Pulmonary:      Effort: Pulmonary effort is normal. No respiratory distress.      Breath sounds: Normal breath sounds. No wheezing, rhonchi or rales.      Comments: Lungs CTA bilaterally; currently on room air  Abdominal:      General: Bowel sounds are normal. There is no distension.      Palpations: Abdomen is soft.      Tenderness: There is no abdominal tenderness. There is no guarding.   Genitourinary:     Comments: Deferred  Musculoskeletal: Normal range of motion.         General: No deformity.      Right lower leg: Edema present.      Left lower leg: Edema present.   Skin:     General: Skin is warm and dry.      Capillary Refill: Capillary refill takes 2 to 3 seconds.      Comments:  Chronic vascular changes to the bilateral lower extremities with venous stasis ulcers noted to both legs.  She also has a purpuric looking rash that is non blanchable from both feet up to her thighs.   Neurological:      General: No focal deficit present.      Mental Status: She is alert and oriented to person, place, and time.      Motor: No weakness.   Psychiatric:         Mood and Affect: Mood normal.         Behavior: Behavior normal.         Thought Content: Thought content normal.         Judgment: Judgment normal.           CRANIAL NERVES     CN III, IV, VI   Pupils are equal, round, and reactive to light.       Significant Labs:   CBC:   Recent Labs   Lab 12/23/20  0051   WBC 13.67*   HGB 13.0   HCT 41.0   *     CMP:   Recent Labs   Lab 12/23/20  0051      K 4.3      CO2 24      BUN 16   CREATININE 1.2   CALCIUM 8.7   PROT 8.6*   ALBUMIN 3.4*   BILITOT 0.2   ALKPHOS 113   AST 15   ALT 9*   ANIONGAP 11   EGFRNONAA 45*     Lactic Acid:   Recent Labs   Lab 12/23/20  0206 12/23/20  0517   LACTATE 1.2 1.0     All pertinent labs within the past 24 hours have been reviewed.    Significant Imaging: I have reviewed and interpreted all pertinent imaging results/findings within the past 24 hours.

## 2020-12-23 NOTE — HPI
Chen Levine is a 75 y.o. female with a PMHx of HTN, HLD, DM, obesity, and renal cell CA s/p nephrectomy who presents to the ED with an onset of a painful generalized rash to the both legs that appeared the day before yesterday. The patient reports she has chronic venous insufficiency with ulcers to both legs but this red rash covering her feet up to her thighs is new.  The patient reports feeling flushed and having chills but did not measure her temperature.  The patient does report chronic leg swelling and thinks it may be worse than usual.  The patient reports her pain is constant and feels like needles or poking her, she currently rates her pain an 8/10.  She reports any kind of movement or touch thanks the pain worsen pain medication provides moderate relief.  The patient denies starting any new medications.  Patient denies any nausea, vomiting, diarrhea, abdominal pain, chest pain, dysuria, weakness, numbness, or tingling.  Her ED workup is significant for chronically elevated white blood cell count an elevated CRP.  The patient received clindamycin and morphine mi in the ED.  The patient will be placed in observation for further workup and evaluation.

## 2020-12-23 NOTE — ASSESSMENT & PLAN NOTE
Patient has chronic hypothyroidism. TFTs reviewed-   Lab Results   Component Value Date    TSH 2.55 01/08/2020   . Will continue chronic levothyroxine and adjust for and clinical changes.

## 2020-12-23 NOTE — PROGRESS NOTES
"[facility]  Adult Nutrition  Progress Note    SUMMARY   Intervention: nutrition education and carbohydrate/fat/sodium modified diet     Recommendation:   1) Continue DM 2000 kcal, cardiac diet ( 4 carb servings/meal)   2) weigh pt weekly   3) Nutrition education and handouts given    Goals: 1) PO intakes > 75% of meals at f/u  Nutrition Goal Status: new  Communication of RD Recs: (POC,  sticky note)    Reason for Assessment    Reason For Assessment: identified at risk by screening criteria  Diagnosis: (cellulitis of lower legs)  Relevant Medical History: HTN, JLD, borderline DM2, renal CA s/p nephrectomy, depression, colitis per pt  Interdisciplinary Rounds: attended    General Information Comments: 74 y/o female admitted with bilateral LE leg wounds likely cellulitis ( ? etiology per MD, not PIs or DM wounds). PTA pt eating very well. Good appetite today. NFPE done 20 no wasting seen. Wt gain per chart review. Educated pt on cardiac, DM diet, pt did not realize she was borderline diabetic.    Nutrition Discharge Planning: to be determined- DM 2000 kcal, cardiac diet    Nutrition Risk Screen    Nutrition Risk Screen: no indicators present    Nutrition/Diet History    Patient Reported Diet/Restrictions/Preferences: general  Spiritual, Cultural Beliefs, [employer]ian Practices, Values that Affect Care: no  Food Allergies: other (see comments)(Intolerant to Milk ( other dairy products do not bother her))  Factors Affecting Nutritional Intake: None identified at this time    Anthropometrics    Temp: 98.4 °F (36.9 °C)  Height Method: Measured(office 20)  Height: 5' 2"  Height (inches): 62 in  Weight Method: Bed Scale  Weight: (!) 218.7 kg (482 lb 2.3 oz)  Weight (lb): (!) 482.15 lb  Ideal Body Weight (IBW), Female: 110 lb  % Ideal Body Weight, Female (lb): 438.32 %  BMI (Calculated): 88.2  BMI Grade: greater than 40 - morbid obesity  Usual Body Weight (UBW), k.7 kg(20)  % Usual Body " Weight: 224.32  % Weight Change From Usual Weight: 123.85 %       Lab/Procedures/Meds    Pertinent Labs Reviewed: reviewed  BMP  Lab Results   Component Value Date     12/23/2020    K 4.3 12/23/2020     12/23/2020    CO2 24 12/23/2020    BUN 16 12/23/2020    CREATININE 1.2 12/23/2020    CALCIUM 8.7 12/23/2020    ANIONGAP 11 12/23/2020    ESTGFRAFRICA 51 (A) 12/23/2020    EGFRNONAA 45 (A) 12/23/2020     Recent Labs   Lab 12/23/20  1135   POCTGLUCOSE 95     Lab Results   Component Value Date    HGBA1C 6.0 (H) 08/28/2020       Pertinent Medications Reviewed: reviewed  Pertinent Medications Comments: senna, insulin, zofran, Kbicarb    Estimated/Assessed Needs    Weight Used For Calorie Calculations: 97.1 kg (214 lb 1.1 oz)([facility] 12/22)  Energy Calorie Requirements (kcal): MSJ ( no AF obesity) = 1425 kcal  Energy Need Method: Anatone-St Jeor  Protein Requirements: 0.8-1.0 g protein/kg ( 77-97 g protein)  Weight Used For Protein Calculations: 97.1 kg (214 lb 1.1 oz)  Fluid Requirements (mL): 1500 ml or per MD  Estimated Fluid Requirement Method: RDA Method  CHO Requirement: 160-195 g      Nutrition Prescription Ordered    Current Diet Order: DM 2000 kcal, cardiac    Evaluation of Received Nutrient/Fluid Intake    Energy Calories Required: meeting needs  Protein Required: meeting needs  Fluid Required: meeting needs  Tolerance: tolerating  % Intake of Estimated Energy Needs: 100%  % Meal Intake: %    Nutrition Risk    Level of Risk/Frequency of Follow-up: low - moderate 1 x weekly    Assessment and Plan    Nutrition related knowledge deficit  R/t no previous instruction by a dietitian  As evidenced by HTN, HLD< borderline DM and asking nutrition related questions  Intervention: above  new     Monitor and Evaluation    Food and Nutrient Intake: energy intake, food and beverage intake  Food and Nutrient Adminstration: diet order  Knowledge/Beliefs/Attitudes: food and nutrition  knowledge/skill  Anthropometric Measurements: weight  Biochemical Data, Medical Tests and Procedures: electrolyte and renal panel, glucose/endocrine profile  Nutrition-Focused Physical Findings: overall appearance, skin     Malnutrition Assessment     Skin (Micronutrient): (Donovan = 20, bilateral leg wound/rash)  Teeth (Micronutrient): (WDL)   Micronutrient Evaluation: no deficiencies               Edema (Fluid Accumulation): 0-->no edema present   Subcutaneous Fat Loss (Final Summary): well nourished  Muscle Loss Evaluation (Final Summary): well nourished         Nutrition Follow-Up    RD Follow-up?: Yes

## 2020-12-23 NOTE — ED PROVIDER NOTES
Encounter Date: 12/22/2020    SCRIBE #1 NOTE: I, Keyonna Cope, vu scribing for, and in the presence of, Corwin Pathak MD.       History     Chief Complaint   Patient presents with    Rash     BLE, started yesterday, causing pain to both legs       Time seen by provider: 1:48 AM on 12/23/2020    Chen Levine is a 75 y.o. female with a PMHx of HTN, HLD, DM, obesity, and renal cell CA s/p nephrectomy who presents to the ED with an onset of a painful generalized rash to the both legs that appeared yesterday. Patient's daughter reports she was also feverish yesterday but they did not measure her temperature. Daughter also reports an increase to patient's chronic leg swelling. Patient has been taking Tylenol for pain without significant relief. Patient denies starting any new medications. The patient denies any other symptoms at this time.      The history is provided by the patient and a relative.     Review of patient's allergies indicates:   Allergen Reactions    Penicillins Itching and Rash    Promethazine Itching and Rash    Sulfa (sulfonamide antibiotics) Itching and Rash     Past Medical History:   Diagnosis Date    Anxiety     Asthma     childhood currently resolved    Borderline diabetes     Cancer 1984    bladder    Chronic venous stasis     Colon polyps     Deep venous thrombosis 1970'S    In the leg    Degenerative disc disease at L5-S1 level     Depression     Hyperlipidemia     Hypertension     Obesity     Overactive bladder     Pyoderma [id number]    No histological diagnosis    Renal cell cancer, right 09/2019    R nephrostomy    Wears partial dentures     UPPER PARTIAL     Past Surgical History:   Procedure Laterality Date    BLADDER SURGERY  1985    c-spine fusion      EPIDURAL STEROID INJECTION  2019    x2     HYSTERECTOMY  1977    ROBOT-ASSISTED LAPAROSCOPIC NEPHRECTOMY Right 9/18/2019    Procedure: ROBOTIC NEPHRECTOMY;  Surgeon: Manny Mondragon MD;  Location:  [facility] OR;  Service: Urology;  Laterality: Right;    ROBOT-ASSISTED LAPAROSCOPIC REPAIR OF INCISIONAL HERNIA N/A 8/31/2020    Procedure: ROBOTIC REPAIR, HERNIA, INCISIONAL;  Surgeon: Agusto Jaramillo MD;  Location: [facility] OR;  Service: General;  Laterality: N/A;    SPINE SURGERY  1987     Family History   Problem Relation Age of Onset    Cancer Mother     Vision loss Mother     Hypertension Father     Stroke Father      Social History     Tobacco Use    Smoking status: Current Every Day Smoker     Packs/day: 0.75     Types: Cigarettes    Smokeless tobacco: Never Used   Substance Use Topics    Alcohol use: No    Drug use: No     Review of Systems   Constitutional: Positive for fever.   HENT: Negative for sore throat.    Respiratory: Negative for shortness of breath.    Cardiovascular: Positive for leg swelling. Negative for chest pain.   Gastrointestinal: Negative for nausea.   Genitourinary: Negative for dysuria.   Musculoskeletal: Negative for back pain.   Skin: Positive for rash.   Neurological: Negative for weakness.   Hematological: Does not bruise/bleed easily.       Physical Exam     Initial Vitals [12/22/20 2203]   BP Pulse Resp Temp SpO2   (!) 146/92 77 (!) 24 98.5 °F (36.9 °C) 97 %      MAP       --         Physical Exam    Nursing note and vitals reviewed.  Constitutional: She appears well-developed and well-nourished.  Non-toxic appearance. No distress.   HENT:   Head: Normocephalic and atraumatic.   Eyes: EOM are normal. Pupils are equal, round, and reactive to light.   Neck: Normal range of motion. Neck supple. No neck rigidity. No JVD present.   Cardiovascular: Normal rate, regular rhythm, normal heart sounds and intact distal pulses. Exam reveals no gallop and no friction rub.    No murmur heard.  Pulses:       Dorsalis pedis pulses are 2+ on the right side and 2+ on the left side.        Posterior tibial pulses are 2+ on the right side and 2+ on the left side.   Pulmonary/Chest: Breath sounds  normal. She has no wheezes. She has no rhonchi. She has no rales.   Abdominal: Soft. Bowel sounds are normal. She exhibits no distension. There is no abdominal tenderness. There is no rebound and no guarding.   Musculoskeletal: Normal range of motion.      Comments: 2+ edema to BLEs.   Neurological: She is alert and oriented to person, place, and time. She has normal strength and normal reflexes. No cranial nerve deficit or sensory deficit. She exhibits normal muscle tone. Coordination normal. GCS eye subscore is 4. GCS verbal subscore is 5. GCS motor subscore is 6.   Skin: Skin is warm and dry. Rash noted.   Diffuse erythematous, non blanching, maculopapular rash to BLEs. Chronic venous skin changes on anterior of lower legs.   Psychiatric: She has a normal mood and affect. Her speech is normal and behavior is normal. She is not actively hallucinating.         ED Course   Procedures  Labs Reviewed   CBC W/ AUTO DIFFERENTIAL - Abnormal; Notable for the following components:       Result Value    WBC 13.67 (*)     MCH 26.3 (*)     MCHC 31.7 (*)     RDW 16.1 (*)     Platelets 468 (*)     Gran # (ANC) 9.3 (*)     Immature Grans (Abs) 0.05 (*)     Mono # 1.4 (*)     All other components within normal limits   COMPREHENSIVE METABOLIC PANEL - Abnormal; Notable for the following components:    Total Protein 8.6 (*)     Albumin 3.4 (*)     ALT 9 (*)     eGFR if  51 (*)     eGFR if non  45 (*)     All other components within normal limits   C-REACTIVE PROTEIN - Abnormal; Notable for the following components:    CRP 45.9 (*)     All other components within normal limits   LACTIC ACID, PLASMA   LACTIC ACID, PLASMA   SARS-COV-2 RNA AMPLIFICATION, QUAL   CK   POCT GLUCOSE          Imaging Results          US Lower Extremity Veins Bilateral (Final result)  Result time 12/23/20 08:09:35    Final result by Jae Llanes MD (12/23/20 08:09:35)                 Impression:      No evidence of deep  venous thrombosis in either lower extremity.      Electronically signed by: Jae Llanes  Date:    12/23/2020  Time:    08:09             Narrative:    EXAMINATION:  US LOWER EXTREMITY VEINS BILATERAL    CLINICAL HISTORY:  Pain in leg, unspecified    TECHNIQUE:  Duplex and color flow Doppler and dynamic compression was performed of the bilateral lower extremity veins was performed.    COMPARISON:  None    FINDINGS:  Right thigh veins: The common femoral, femoral, popliteal, upper greater saphenous, and deep femoral veins are patent and free of thrombus. The veins are normally compressible and have normal phasic flow and augmentation response.    Right calf veins: The visualized calf veins are patent.    Left thigh veins: The common femoral, femoral, popliteal, upper greater saphenous, and deep femoral veins are patent and free of thrombus. The veins are normally compressible and have normal phasic flow and augmentation response.    Left calf veins: The visualized calf veins are patent.    Miscellaneous: Mild subcutaneous soft tissue edema in each calf.                                 Medical Decision Making:   History:   Old Medical Records: I decided to obtain old medical records.  Initial Assessment:   Patient is 75-year-old woman with past medical history of diabetes, renal cell cancer status post nephrectomy who presents emergency department for bilateral lower extremity painful erythematous rash and subjective fevers at home.  She has chronic lower extremity edema with venous stasis skin changes.  The rash is nonblanching.  Differential diagnosis includes but is not limited to cellulitis, ITP, vasculitis, purpura.  She does not appear altered as not currently febrile, I doubt TTP.  Patient has leukocytosis of 13 K. platelets are 468.  Ultrasound performed shows no evidence of DVT.  She is provided with pain medication with improvement her pain.  Started on empiric clindamycin while awaiting workup.   Discussed with Hospital Medicine who agrees to admit the patient for further evaluation.  Clinical Tests:   Lab Tests: Ordered and Reviewed  Radiological Study: Ordered and Reviewed            Scribe Attestation:   Scribe #1: I performed the above scribed service and the documentation accurately describes the services I performed. I attest to the accuracy of the note.     I, Lawson Neumann, personally performed the services described in this documentation. All medical record entries made by the scribe were at my direction and in my presence.  I have reviewed the chart and agree that the record reflects my personal performance and is accurate and complete. Corwin Pathak MD.                  Clinical Impression:     ICD-10-CM ICD-9-CM   1. Cellulitis of lower extremity, unspecified laterality  L03.119 682.6   2. Leg pain  M79.606 729.5   3. Chest pain  R07.9 786.50   4. Cellulitis of lower extremity  L03.119 682.6                          ED Disposition Condition    Observation                             Corwin Pathak MD  12/23/20 5471

## 2020-12-23 NOTE — PLAN OF CARE
12/23/20 2761   MCKEON Message   Medicare Outpatient and Observation Notification regarding financial responsibility Explained to patient/caregiver;Signed/date by patient/caregiver   Date MCKEON was signed 12/23/20   Time MCKEON was signed [id number]

## 2020-12-23 NOTE — PLAN OF CARE
"Plan of care review with pt, pt states "understanding,"  no redness/swelling noted to IV site, non blanchable red spots present to entire amada legs, large scab looking areas to BLE, pain is control with current regimen, remains afebrile while receiving antibiotic therapy, see flowsheet, ambulates with stand by assist, will continue to monitor, observe and note any changes, safety maintain    "

## 2020-12-23 NOTE — PLAN OF CARE
Cm completed the assessment with pt at bedside. Pt lives with daughter- Xiomara. Pt is independent in care and drives. PCP is Dr. Auguste.  Insurance verified as [employer].  Pt denies diabetes, dialysis and coumadin.  Disposition:  Pt will discharge to home. Daughter will provide transportation on discharge. Pharmacy of choice is [facility]     12/23/20 4249   Discharge Assessment   Assessment Type Discharge Planning Assessment   Confirmed/corrected address and phone number on facesheet? Yes   Assessment information obtained from? Patient   Expected Length of Stay (days) 2   Communicated expected length of stay with patient/caregiver yes   Prior to hospitilization cognitive status: Alert/Oriented   Prior to hospitalization functional status: Independent   Current cognitive status: Alert/Oriented   Current Functional Status: Independent   Facility Arrived From: home   Lives With child(darvin), adult   Able to Return to Prior Arrangements yes   Is patient able to care for self after discharge? Yes   Who are your caregiver(s) and their phone number(s)? Xiomara - [phone]   Patient's perception of discharge disposition home or selfcare   Readmission Within the Last 30 Days no previous admission in last 30 days   Patient currently being followed by outpatient case management? Yes   If yes, name of outpatient case management following: insurance company assigned oupatient case management   Patient currently receives any other outside agency services? No   Is it the patient/care giver preference to resume care with the current outside agency? No   Equipment Currently Used at Home none   Do you have any problems affording any of your prescribed medications? No   Is the patient taking medications as prescribed? yes   Does the patient have transportation home? Yes   Transportation Anticipated family or friend will provide   Dialysis Name and Scheduled days na   Does the patient receive services at the [facility]? No    Discharge Plan A Home with family   Discharge Plan B Home with family   DME Needed Upon Discharge  none   Patient/Family in Agreement with Plan yes

## 2020-12-23 NOTE — ED NOTES
Assumed care from Nirmala:    Chen Levine is awake, alert and oriented x 3, skin warm and dry, in NAD.  Hep Lock in place, site OK, side rails up, call bell within reach.

## 2020-12-23 NOTE — FIRST PROVIDER EVALUATION
Emergency Department TeleTriage Encounter Note      CHIEF COMPLAINT    Chief Complaint   Patient presents with    Rash     BLE, started yesterday, causing pain to both legs       VITAL SIGNS   Initial Vitals [12/22/20 2203]   BP Pulse Resp Temp SpO2   (!) 146/92 77 (!) 24 98.5 °F (36.9 °C) 97 %      MAP       --            ALLERGIES    Review of patient's allergies indicates:   Allergen Reactions    Penicillins Itching and Rash    Promethazine Itching and Rash    Sulfa (sulfonamide antibiotics) Itching and Rash       PROVIDER TRIAGE NOTE  76 y/o female which presents with a worsening rash to her bilateral lower extremities. She states the pain is unbearable. Pt reports fever yesterday but no other symptom.       ORDERS  Labs Reviewed - No data to display    ED Orders (720h ago, onward)    Start Ordered     Status Ordering Provider    12/22/20 2211 12/22/20 2210  Saline lock IV  Once      Ordered ILEANA SAHNI    12/22/20 2211 12/22/20 2210  Cardiac Monitoring - Adult  Continuous     Comments: Notify Physician If:    Ordered ILEANA SAHNI    12/22/20 2211 12/22/20 2210  CBC auto differential  STAT      Ordered ILEANA SAHNI    12/22/20 2211 12/22/20 2210  Comprehensive metabolic panel  STAT      Ordered ILEANA SAHNI    12/22/20 2211 12/22/20 2210  POCT glucose  Once      Ordered ILEANA SAHNI            Virtual Visit Note: The provider triage portion of this emergency department evaluation and documentation was performed via [employer], a HIPAA-compliant telemedicine application, in concert with a tele-presenter in the room. A face to face patient evaluation with one of my colleagues will occur once the patient is placed in an emergency department room.      DISCLAIMER: This note was prepared with eegoes voice recognition transcription software. Garbled syntax, mangled pronouns, and other bizarre constructions may be attributed to that software system.

## 2020-12-23 NOTE — ASSESSMENT & PLAN NOTE
Body mass index is 39.14 kg/m². Morbid obesity complicates all aspects of disease management from diagnostic modalities to treatment. Weight loss encouraged and health benefits explained to patient.

## 2020-12-23 NOTE — ASSESSMENT & PLAN NOTE
Patient has rash to bilateral lower extremities.  Patient describes the rash as very painful.  It appears almost purpuric and is non blanchable.  Could be some type vasculitis?    Nursing to place photos in the chart.    Patient started on clindamycin in the ED, will continue for now.  Ultrasound of bilateral lower extremities negative for DVT.  Pain control.  Patient does have chronic venous stasis ulcers as well.  Lactic negative.  CRP and sed rate ordered.

## 2020-12-23 NOTE — ASSESSMENT & PLAN NOTE
Patient is chronically on statin.will continue for now. Monitor clinically. Last LDL was   Lab Results   Component Value Date    LDLCALC 131.8 (H) 11/11/2004

## 2020-12-23 NOTE — NURSING
Pt arrived from ED via wheelchair at 0628. VSS. Pain 4/10 in bilat lower extremities. Pt in bed, alarm set and active, oriented to environment, call light and personal items within reach, Fall risk and Allergy bands placed on pt.

## 2020-12-23 NOTE — ASSESSMENT & PLAN NOTE
Chronic, controlled.  Latest blood pressure and vitals reviewed-   Temp:  [98.5 °F (36.9 °C)]   Pulse:  [73-77]   Resp:  [16-24]   BP: (128-183)/(58-92)   SpO2:  [95 %-97 %] .   Home meds for hypertension were reviewed and noted below. Hospital anti-hypertensive changes were made as shown below.  Hypertension Medications             diltiaZEM (CARDIZEM) 90 MG tablet Take 1 tablet (90 mg total) by mouth 2 (two) times daily.    furosemide (LASIX) 40 MG tablet Take 1 tablet (40 mg total) by mouth 3 (three) times a week.      Hospital Medications             diltiaZEM tablet 90 mg 90 mg, Oral, 2 times daily        Will utilize p.r.n. blood pressure medication only if patient's blood pressure greater than  180/110 and she develops symptoms such as worsening chest pain or shortness of breath.

## 2020-12-23 NOTE — H&P
[facility] Hospital Medicine  History & Physical    Patient Name: Chen Levine  MRN: [id number]  Patient Class: OP- Observation  Admission Date: 12/23/2020  Attending Physician: Cortney Crandall MD   Primary Care Provider: Adan Angeles MD         Patient information was obtained from patient, past medical records and ER records.     Subjective:     Principal Problem:Cellulitis of lower extremity    Chief Complaint:   Chief Complaint   Patient presents with    Rash     BLE, started yesterday, causing pain to both legs        HPI: Chen Levine is a 75 y.o. female with a PMHx of HTN, HLD, DM, obesity, and renal cell CA s/p nephrectomy who presents to the ED with an onset of a painful generalized rash to the both legs that appeared the day before yesterday. The patient reports she has chronic venous insufficiency with ulcers to both legs but this red rash covering her feet up to her thighs is new.  The patient reports feeling flushed and having chills but did not measure her temperature.  The patient does report chronic leg swelling and thinks it may be worse than usual.  The patient reports her pain is constant and feels like needles or poking her, she currently rates her pain an 8/10.  She reports any kind of movement or touch thanks the pain worsen pain medication provides moderate relief.  The patient denies starting any new medications.  Patient denies any nausea, vomiting, diarrhea, abdominal pain, chest pain, dysuria, weakness, numbness, or tingling.  Her ED workup is significant for chronically elevated white blood cell count an elevated CRP.  The patient received clindamycin and morphine mi in the ED.  The patient will be placed in observation for further workup and evaluation.    Past Medical History:   Diagnosis Date    Anxiety     Asthma     childhood currently resolved    Borderline diabetes     Cancer 1984    bladder    Chronic venous stasis     Colon polyps      Deep venous thrombosis 1970'S    In the leg    Degenerative disc disease at L5-S1 level     Depression     Hyperlipidemia     Hypertension     Obesity     Overactive bladder     Pyoderma 1816-6166    No histological diagnosis    Renal cell cancer, right 09/2019    R nephrostomy    Wears partial dentures     UPPER PARTIAL       Past Surgical History:   Procedure Laterality Date    BLADDER SURGERY  1985    c-spine fusion      EPIDURAL STEROID INJECTION  2019    x2     HYSTERECTOMY  1977    ROBOT-ASSISTED LAPAROSCOPIC NEPHRECTOMY Right 9/18/2019    Procedure: ROBOTIC NEPHRECTOMY;  Surgeon: Manny Mondragon MD;  Location: [facility] OR;  Service: Urology;  Laterality: Right;    ROBOT-ASSISTED LAPAROSCOPIC REPAIR OF INCISIONAL HERNIA N/A 8/31/2020    Procedure: ROBOTIC REPAIR, HERNIA, INCISIONAL;  Surgeon: Agusto Jaramillo MD;  Location: [facility] OR;  Service: General;  Laterality: N/A;    SPINE SURGERY  1987       Review of patient's allergies indicates:   Allergen Reactions    Penicillins Itching and Rash    Promethazine Itching and Rash    Sulfa (sulfonamide antibiotics) Itching and Rash       No current facility-administered medications on file prior to encounter.      Current Outpatient Medications on File Prior to Encounter   Medication Sig    acetaminophen (TYLENOL) 325 MG tablet Take 2 tablets (650 mg total) by mouth every 6 (six) hours as needed for Pain.    atorvastatin (LIPITOR) 20 MG tablet Take 1 tablet (20 mg total) by mouth Daily.    clobetasoL (TEMOVATE) 0.05 % cream APPLY  CREAM TOPICALLY TO AFFECTED AREA TWICE DAILY    diltiaZEM (CARDIZEM) 90 MG tablet Take 1 tablet (90 mg total) by mouth 2 (two) times daily.    escitalopram oxalate (LEXAPRO) 5 MG Tab Take 1 tablet (5 mg total) by mouth once daily. (Patient taking differently: Take 5 mg by mouth nightly. )    flurazepam (DALMANE) 15 MG Cap TAKE 1 TO 2 CAPSULES BY MOUTH AT BEDTIME AS NEEDED FOR SLEEP    furosemide (LASIX) 40 MG  tablet Take 1 tablet (40 mg total) by mouth 3 (three) times a week. (Patient taking differently: Take 40 mg by mouth once daily. )    gabapentin (NEURONTIN) 600 MG tablet Take 600 mg by mouth 2 (two) times daily.     HYDROcodone-acetaminophen (NORCO) 5-325 mg per tablet Take 1 tablet by mouth every 6 (six) hours as needed for Pain.    levothyroxine (SYNTHROID) 50 MCG tablet before breakfast.     pentoxifylline (TRENTAL) 400 mg TbSR 1 po tid    pyridoxine, vitamin B6, (VITAMIN B-6) 100 MG Tab Take 50 mg by mouth once daily.     Family History     Problem Relation (Age of Onset)    Cancer Mother    Hypertension Father    Stroke Father    Vision loss Mother        Tobacco Use    Smoking status: Current Every Day Smoker     Packs/day: 0.75     Types: Cigarettes    Smokeless tobacco: Never Used   Substance and Sexual Activity    Alcohol use: No    Drug use: No    Sexual activity: Not Currently     Review of Systems   Constitutional: Positive for chills and fever (Subjective). Negative for activity change, appetite change, diaphoresis and fatigue.   HENT: Negative for congestion, postnasal drip, rhinorrhea and sore throat.    Eyes: Negative for photophobia and visual disturbance.   Respiratory: Negative for cough, chest tightness, shortness of breath and wheezing.    Cardiovascular: Negative for chest pain, palpitations and leg swelling.   Gastrointestinal: Negative for abdominal distention, abdominal pain, diarrhea, nausea and vomiting.   Genitourinary: Negative for dysuria, flank pain, frequency and hematuria.   Musculoskeletal: Positive for myalgias. Negative for arthralgias, gait problem and neck pain.   Skin: Positive for rash. Negative for color change, pallor and wound.   Neurological: Negative for dizziness, syncope, weakness, numbness and headaches.   Psychiatric/Behavioral: Negative for agitation, confusion and hallucinations. The patient is not nervous/anxious.      Objective:     Vital Signs (Most  Recent):  Temp: 98.5 °F (36.9 °C) (12/22/20 2203)  Pulse: 73 (12/23/20 0302)  Resp: 16 (12/23/20 0612)  BP: (!) 128/58 (12/23/20 0302)  SpO2: 95 % (12/23/20 0302) Vital Signs (24h Range):  Temp:  [98.5 °F (36.9 °C)] 98.5 °F (36.9 °C)  Pulse:  [73-77] 73  Resp:  [16-24] 16  SpO2:  [95 %-97 %] 95 %  BP: (128-183)/(58-92) 128/58     Weight: 97.1 kg (214 lb)  Body mass index is 39.14 kg/m².    Physical Exam  Vitals signs and nursing note reviewed.   Constitutional:       General: She is in acute distress (Appears to be in pain).      Appearance: She is well-developed. She is obese. She is not toxic-appearing or diaphoretic.   HENT:      Head: Normocephalic and atraumatic.      Right Ear: External ear normal.      Left Ear: External ear normal.      Mouth/Throat:      Mouth: Mucous membranes are moist.   Eyes:      Pupils: Pupils are equal, round, and reactive to light.   Neck:      Musculoskeletal: Normal range of motion.      Vascular: No JVD.   Cardiovascular:      Rate and Rhythm: Normal rate and regular rhythm.      Heart sounds: No murmur.   Pulmonary:      Effort: Pulmonary effort is normal. No respiratory distress.      Breath sounds: Normal breath sounds. No wheezing, rhonchi or rales.      Comments: Lungs CTA bilaterally; currently on room air  Abdominal:      General: Bowel sounds are normal. There is no distension.      Palpations: Abdomen is soft.      Tenderness: There is no abdominal tenderness. There is no guarding.   Genitourinary:     Comments: Deferred  Musculoskeletal: Normal range of motion.         General: No deformity.      Right lower leg: Edema present.      Left lower leg: Edema present.   Skin:     General: Skin is warm and dry.      Capillary Refill: Capillary refill takes 2 to 3 seconds.      Comments: Chronic vascular changes to the bilateral lower extremities with venous stasis ulcers noted to both legs.  She also has a purpuric looking rash that is non blanchable from both feet up to her  thighs.   Neurological:      General: No focal deficit present.      Mental Status: She is alert and oriented to person, place, and time.      Motor: No weakness.   Psychiatric:         Mood and Affect: Mood normal.         Behavior: Behavior normal.         Thought Content: Thought content normal.         Judgment: Judgment normal.           CRANIAL NERVES     CN III, IV, VI   Pupils are equal, round, and reactive to light.       Significant Labs:   CBC:   Recent Labs   Lab 12/23/20  0051   WBC 13.67*   HGB 13.0   HCT 41.0   *     CMP:   Recent Labs   Lab 12/23/20  0051      K 4.3      CO2 24      BUN 16   CREATININE 1.2   CALCIUM 8.7   PROT 8.6*   ALBUMIN 3.4*   BILITOT 0.2   ALKPHOS 113   AST 15   ALT 9*   ANIONGAP 11   EGFRNONAA 45*     Lactic Acid:   Recent Labs   Lab 12/23/20  0206 12/23/20  0517   LACTATE 1.2 1.0     All pertinent labs within the past 24 hours have been reviewed.    Significant Imaging: I have reviewed and interpreted all pertinent imaging results/findings within the past 24 hours.    Assessment/Plan:     * Cellulitis of lower extremity  Patient has rash to bilateral lower extremities.  Patient describes the rash as very painful.  It appears almost purpuric and is non blanchable.  Could be some type vasculitis?    Nursing to place photos in the chart.    Patient started on clindamycin in the ED, will continue for now.  Ultrasound of bilateral lower extremities negative for DVT.  Pain control.  Patient does have chronic venous stasis ulcers as well.  Lactic negative.  CRP and sed rate ordered.    Hypothyroid  Patient has chronic hypothyroidism. TFTs reviewed-   Lab Results   Component Value Date    TSH 2.55 01/08/2020   . Will continue chronic levothyroxine and adjust for and clinical changes.    BMI 39.0-39.9,adult  Body mass index is 39.14 kg/m². Morbid obesity complicates all aspects of disease management from diagnostic modalities to treatment. Weight loss  encouraged and health benefits explained to patient.    Chronic venous stasis  Chronic. Elevate legs.  Can consider wrapping legs or wearing compression stockings but unlikely to tolerate now given pain.    Type 2 diabetes mellitus without complication, without long-term current use of insulin  Patient's FSGs are controlled on current hypoglycemics.   Last A1c reviewed-   Lab Results   Component Value Date    HGBA1C 6.0 (H) 08/28/2020     Most recent fingerstick glucose reviewed-   Recent Labs   Lab 12/23/20  0043   POCTGLUCOSE 94     Current correctional scale  Low  Maintain anti-hyperglycemic dose as follows-   Antihyperglycemics (From admission, onward)    Start     Stop Route Frequency Ordered    12/23/20 0629  insulin aspart U-100 pen 0-5 Units      -- SubQ Before meals & nightly PRN 12/23/20 0629       Accu-Cheks a.c./HS.  Diabetic diet.    Benign hypertension  Chronic, controlled.  Latest blood pressure and vitals reviewed-   Temp:  [98.5 °F (36.9 °C)]   Pulse:  [73-77]   Resp:  [16-24]   BP: (128-183)/(58-92)   SpO2:  [95 %-97 %] .   Home meds for hypertension were reviewed and noted below. Hospital anti-hypertensive changes were made as shown below.  Hypertension Medications             diltiaZEM (CARDIZEM) 90 MG tablet Take 1 tablet (90 mg total) by mouth 2 (two) times daily.    furosemide (LASIX) 40 MG tablet Take 1 tablet (40 mg total) by mouth 3 (three) times a week.      Hospital Medications             diltiaZEM tablet 90 mg 90 mg, Oral, 2 times daily        Will utilize p.r.n. blood pressure medication only if patient's blood pressure greater than  180/110 and she develops symptoms such as worsening chest pain or shortness of breath.    Hyperlipidemia   Patient is chronically on statin.will continue for now. Monitor clinically. Last LDL was   Lab Results   Component Value Date    LDLCALC 131.8 (H) 11/11/2004       Current smoker  Assistance with smoking cessation was offered, including:  [x]   Medications  [x]  Counseling  []  Printed Information on Smoking Cessation  []  Referral to a Smoking Cessation Program    Patient was counseled regarding smoking for 3-10 minutes.    Mixed anxiety depressive disorder  Chronic, stable. Continue home medication.    VTE Risk Mitigation (From admission, onward)         Ordered     IP VTE HIGH RISK PATIENT  Once      12/23/20 0629     Place sequential compression device  Until discontinued      12/23/20 0629                   Ayla Nagy NP  Department of Hospital Medicine   [facility]

## 2020-12-24 VITALS
HEART RATE: 66 BPM | DIASTOLIC BLOOD PRESSURE: 73 MMHG | TEMPERATURE: 97 F | HEIGHT: 62 IN | SYSTOLIC BLOOD PRESSURE: 146 MMHG | BODY MASS INDEX: 41.04 KG/M2 | WEIGHT: 223 LBS | OXYGEN SATURATION: 98 % | RESPIRATION RATE: 18 BRPM

## 2020-12-24 LAB
ANA PATTERN 1: NORMAL
ANA SER QL IF: POSITIVE
ANA TITR SER IF: NORMAL {TITER}
ANION GAP SERPL CALC-SCNC: 7 MMOL/L (ref 8–16)
BASOPHILS # BLD AUTO: 0.07 K/UL (ref 0–0.2)
BASOPHILS NFR BLD: 0.7 % (ref 0–1.9)
BUN SERPL-MCNC: 18 MG/DL (ref 8–23)
CALCIUM SERPL-MCNC: 8.1 MG/DL (ref 8.7–10.5)
CHLORIDE SERPL-SCNC: 105 MMOL/L (ref 95–110)
CO2 SERPL-SCNC: 25 MMOL/L (ref 23–29)
CREAT SERPL-MCNC: 1.1 MG/DL (ref 0.5–1.4)
DIFFERENTIAL METHOD: ABNORMAL
EOSINOPHIL # BLD AUTO: 0.2 K/UL (ref 0–0.5)
EOSINOPHIL NFR BLD: 2.1 % (ref 0–8)
ERYTHROCYTE [DISTWIDTH] IN BLOOD BY AUTOMATED COUNT: 16.2 % (ref 11.5–14.5)
EST. GFR  (AFRICAN AMERICAN): 57 ML/MIN/1.73 M^2
EST. GFR  (NON AFRICAN AMERICAN): 50 ML/MIN/1.73 M^2
GLUCOSE SERPL-MCNC: 101 MG/DL (ref 70–110)
HCT VFR BLD AUTO: 35.5 % (ref 37–48.5)
HGB BLD-MCNC: 11.1 G/DL (ref 12–16)
IMM GRANULOCYTES # BLD AUTO: 0.04 K/UL (ref 0–0.04)
IMM GRANULOCYTES NFR BLD AUTO: 0.4 % (ref 0–0.5)
LYMPHOCYTES # BLD AUTO: 2.5 K/UL (ref 1–4.8)
LYMPHOCYTES NFR BLD: 26.8 % (ref 18–48)
MAGNESIUM SERPL-MCNC: 2.2 MG/DL (ref 1.6–2.6)
MCH RBC QN AUTO: 26.3 PG (ref 27–31)
MCHC RBC AUTO-ENTMCNC: 31.3 G/DL (ref 32–36)
MCV RBC AUTO: 84 FL (ref 82–98)
MONOCYTES # BLD AUTO: 1 K/UL (ref 0.3–1)
MONOCYTES NFR BLD: 10.9 % (ref 4–15)
NEUTROPHILS # BLD AUTO: 5.6 K/UL (ref 1.8–7.7)
NEUTROPHILS NFR BLD: 59.1 % (ref 38–73)
NRBC BLD-RTO: 0 /100 WBC
PHOSPHATE SERPL-MCNC: 4.2 MG/DL (ref 2.7–4.5)
PLATELET # BLD AUTO: 386 K/UL (ref 150–350)
PMV BLD AUTO: 9.7 FL (ref 9.2–12.9)
POTASSIUM SERPL-SCNC: 4.4 MMOL/L (ref 3.5–5.1)
RBC # BLD AUTO: 4.22 M/UL (ref 4–5.4)
SODIUM SERPL-SCNC: 137 MMOL/L (ref 136–145)
WBC # BLD AUTO: 9.46 K/UL (ref 3.9–12.7)

## 2020-12-24 PROCEDURE — 25000003 PHARM REV CODE 250: Performed by: NURSE PRACTITIONER

## 2020-12-24 PROCEDURE — 96376 TX/PRO/DX INJ SAME DRUG ADON: CPT

## 2020-12-24 PROCEDURE — 82595 ASSAY OF CRYOGLOBULIN: CPT

## 2020-12-24 PROCEDURE — 80048 BASIC METABOLIC PNL TOTAL CA: CPT

## 2020-12-24 PROCEDURE — 36415 COLL VENOUS BLD VENIPUNCTURE: CPT

## 2020-12-24 PROCEDURE — 96374 THER/PROPH/DIAG INJ IV PUSH: CPT | Mod: 59

## 2020-12-24 PROCEDURE — G0378 HOSPITAL OBSERVATION PER HR: HCPCS

## 2020-12-24 PROCEDURE — 84100 ASSAY OF PHOSPHORUS: CPT

## 2020-12-24 PROCEDURE — 85025 COMPLETE CBC W/AUTO DIFF WBC: CPT

## 2020-12-24 PROCEDURE — 83735 ASSAY OF MAGNESIUM: CPT

## 2020-12-24 PROCEDURE — 25000003 PHARM REV CODE 250: Performed by: HOSPITALIST

## 2020-12-24 RX ORDER — HYDROCODONE BITARTRATE AND ACETAMINOPHEN 5; 325 MG/1; MG/1
2 TABLET ORAL EVERY 6 HOURS PRN
Qty: 50 TABLET | Refills: 0 | Status: SHIPPED | OUTPATIENT
Start: 2020-12-24

## 2020-12-24 RX ORDER — CLINDAMYCIN HYDROCHLORIDE 300 MG/1
300 CAPSULE ORAL 4 TIMES DAILY
Qty: 28 CAPSULE | Refills: 0 | Status: SHIPPED | OUTPATIENT
Start: 2020-12-24 | End: 2020-12-31

## 2020-12-24 RX ADMIN — LEVOTHYROXINE SODIUM 50 MCG: 0.05 TABLET ORAL at 05:12

## 2020-12-24 RX ADMIN — HYDROCODONE BITARTRATE AND ACETAMINOPHEN 1 TABLET: 5; 325 TABLET ORAL at 09:12

## 2020-12-24 RX ADMIN — CLINDAMYCIN IN 5 PERCENT DEXTROSE 600 MG: 12 INJECTION, SOLUTION INTRAVENOUS at 09:12

## 2020-12-24 RX ADMIN — CLINDAMYCIN IN 5 PERCENT DEXTROSE 600 MG: 12 INJECTION, SOLUTION INTRAVENOUS at 01:12

## 2020-12-24 RX ADMIN — DILTIAZEM HYDROCHLORIDE 90 MG: 30 TABLET, FILM COATED ORAL at 09:12

## 2020-12-24 RX ADMIN — DIPHENHYDRAMINE HYDROCHLORIDE 25 MG: 25 CAPSULE ORAL at 01:12

## 2020-12-24 RX ADMIN — GABAPENTIN 600 MG: 300 CAPSULE ORAL at 09:12

## 2020-12-24 RX ADMIN — HYDROCODONE BITARTRATE AND ACETAMINOPHEN 1 TABLET: 5; 325 TABLET ORAL at 01:12

## 2020-12-24 RX ADMIN — DOCUSATE SODIUM - SENNOSIDES 1 TABLET: 50; 8.6 TABLET, FILM COATED ORAL at 09:12

## 2020-12-24 NOTE — PLAN OF CARE
Pt clear for DC from case management standpoint. Discharging to home     12/24/20 2497   Final Note   Assessment Type Final Discharge Note   Anticipated Discharge Disposition Home

## 2020-12-24 NOTE — PLAN OF CARE
"Pt states "understanding," to d/c instructions, follow up visits and new medication administration, telemetry and IV removed, pt tolerated well, pt packed personal belongings per self, denies joint pain/discomfort prior to d/c, escorted to main lobby by staff, to home per private vehicle, safety maintain    "

## 2020-12-24 NOTE — PLAN OF CARE
POC discussed with pt, pt verbalized understanding. Oriented x4. PIV cdi, infusing. NSR on tele. Pt ambulated to the restroom with standby assist. Pt refuses the accu check, stating that she is not a diabetic and does not want those. Both legs cleaned with wound cleanser per orders. Aerobic culture sent to lab. Pt complained of pain, medicated per orders, states that pain is controlled. No nausea or SOB. Pt states that the itching in her legs seems to be improving. Call light in reach, bed alarm set, safety maintained. No complaints or requests at this time, will continue to monitor.

## 2020-12-24 NOTE — CONSULTS
Patient with chronic ulcerations to bilateral lower extremities. Clean both legs with wound cleanser and leave open to air. Will hold off on adding any products to the patients skin due to rash area. Will follow up as needed.

## 2020-12-26 NOTE — DISCHARGE SUMMARY
[facility] Medicine  Discharge Summary      Patient Name: Chen Levine  MRN: [id number]  Patient Class: OP- Observation  Admission Date: 12/23/2020  Hospital Length of Stay: 0 days  Discharge Date and Time: 12/24/2020  1:15 PM  Attending Physician: No att. providers found   Discharging Provider: Filippo Simmons MD  Primary Care Provider: Adan Angeles MD      HPI:   Chen Levine is a 75 y.o. female with a PMHx of HTN, HLD, DM, obesity, and renal cell CA s/p nephrectomy who presents to the ED with an onset of a painful generalized rash to the both legs that appeared the day before yesterday. The patient reports she has chronic venous insufficiency with ulcers to both legs but this red rash covering her feet up to her thighs is new.  The patient reports feeling flushed and having chills but did not measure her temperature.  The patient does report chronic leg swelling and thinks it may be worse than usual.  The patient reports her pain is constant and feels like needles or poking her, she currently rates her pain an 8/10.  She reports any kind of movement or touch thanks the pain worsen pain medication provides moderate relief.  The patient denies starting any new medications.  Patient denies any nausea, vomiting, diarrhea, abdominal pain, chest pain, dysuria, weakness, numbness, or tingling.  Her ED workup is significant for chronically elevated white blood cell count an elevated CRP.  The patient received clindamycin and morphine mi in the ED.  The patient will be placed in observation for further workup and evaluation.    * No surgery found *      Hospital Course:   She was given intravenous antibiotics.  She was seen by Infectious Disease physician Dr. Jama.  She assessed the skin findings as vasculitis rash with etiology undetermined.  She took an aerobic culture of one of the tibial lesions.  Lab tests were ordered, including MARLENE, complement levels, and  cryglobulin level.  MARLENE was 1:160, speckled pattern.  C3 and C4 were normal.  Cryo level still in process.  Evita recommended continuing clindamycin via oral route.  The rash was somewhat improved on 2nd day of hospital stay.  Steroids were not indicated at the time.  She was discharged home in good condition.  She was instructed to establish care with dermatology again.    Physical Exam  Vitals signs and nursing note reviewed.   Constitutional:       General: She is in acute distress (Appears to be in pain).      Appearance: She is well-developed. She is obese. She is not toxic-appearing or diaphoretic.   HENT:      Head: Normocephalic and atraumatic.      Right Ear: External ear normal.      Left Ear: External ear normal.      Mouth/Throat:      Mouth: Mucous membranes are moist.   Eyes:      Pupils: Pupils are equal, round, and reactive to light.   Neck:      Musculoskeletal: Normal range of motion.      Vascular: No JVD.   Cardiovascular:      Rate and Rhythm: Normal rate and regular rhythm.      Heart sounds: No murmur.   Pulmonary:      Effort: Pulmonary effort is normal. No respiratory distress.      Breath sounds: Normal breath sounds. No wheezing, rhonchi or rales.      Consults:   Consults (From admission, onward)        Status Ordering Provider     Inpatient consult to Infectious Diseases  Once     Provider:  MD Willy Pierce SARAH M.          No new Assessment & Plan notes have been filed under this hospital service since the last note was generated.  Service: Hospital Medicine    Final Active Diagnoses:    Diagnosis Date Noted POA    PRINCIPAL PROBLEM:  Cellulitis of lower extremity [L03.119] 12/23/2020 Yes    BMI 39.0-39.9,adult [Z68.39] 12/23/2020 Not Applicable    Hypothyroid [E03.9] 12/23/2020 Yes    Chronic venous stasis [I87.8]  Yes    Hyperlipidemia [E78.5] 07/05/2017 Yes    Benign hypertension [I10] 07/05/2017 Yes    Type 2 diabetes mellitus without  complication, without long-term current use of insulin [E11.9] 07/05/2017 Yes    Mixed anxiety depressive disorder [F41.8] 07/05/2017 Yes    Current smoker [F17.200] 07/05/2017 Yes      Problems Resolved During this Admission:       Discharged Condition: good    Disposition: Home or Self Care    Follow Up:  Follow-up Information     Adan Angeles MD In 1 week.    Specialty: Family Medicine  Contact information:  [address] [facility] [facility]  [place] [address]  [phone]             Rita Clemente MD. Schedule an appointment as soon as possible for a visit in 2 weeks.    Specialty: Dermatology  Why: Dermatologist in the [facility]  Contact information:  [address] [place] [address] [address]  [facility] [address]  [phone]                 Patient Instructions:      Diet Adult Regular     Activity as tolerated       Significant Diagnostic Studies:   BMP  Lab Results   Component Value Date     12/24/2020    K 4.4 12/24/2020     12/24/2020    CO2 25 12/24/2020    BUN 18 12/24/2020    CREATININE 1.1 12/24/2020    CALCIUM 8.1 (L) 12/24/2020    ANIONGAP 7 (L) 12/24/2020    ESTGFRAFRICA 57 (A) 12/24/2020    EGFRNONAA 50 (A) 12/24/2020     Lab Results   Component Value Date    WBC 9.46 12/24/2020    HGB 11.1 (L) 12/24/2020    HCT 35.5 (L) 12/24/2020    MCV 84 12/24/2020     (H) 12/24/2020        Ref. Range 1/8/2020 13:02 12/23/2020 11:13   MARLENE Screen Latest Ref Range: Negative <1:80   Positive (A)   MARLENE Titer 1 Unknown  1:160   MARLENE PATTERN 1 Unknown  Speckled       Pending Diagnostic Studies:     Procedure Component Value Units Date/Time    Anti-Neutrophilic Cytoplasmic Antibody [[id number]] Collected: 12/23/20 1113    Order Status: Sent Lab Status: In process Updated: 12/23/20 1117    Specimen: Blood          Medications:  Reconciled Home Medications:      Medication List      START taking these medications    clindamycin 300 MG capsule  Commonly known as: CLEOCIN  Take 1 capsule (300 mg total) by  mouth 4 (four) times daily. for 7 days        CHANGE how you take these medications    escitalopram oxalate 5 MG Tab  Commonly known as: LEXAPRO  Take 1 tablet (5 mg total) by mouth once daily.  What changed: when to take this     furosemide 40 MG tablet  Commonly known as: LASIX  Take 1 tablet (40 mg total) by mouth 3 (three) times a week.  What changed: when to take this     HYDROcodone-acetaminophen 5-325 mg per tablet  Commonly known as: NORCO  Take 2 tablets by mouth every 6 (six) hours as needed for Pain.  What changed: how much to take        CONTINUE taking these medications    acetaminophen 325 MG tablet  Commonly known as: TYLENOL  Take 2 tablets (650 mg total) by mouth every 6 (six) hours as needed for Pain.     atorvastatin 20 MG tablet  Commonly known as: LIPITOR  Take 1 tablet (20 mg total) by mouth Daily.     clobetasoL 0.05 % cream  Commonly known as: TEMOVATE  APPLY  CREAM TOPICALLY TO AFFECTED AREA TWICE DAILY     diltiaZEM 90 MG tablet  Commonly known as: CARDIZEM  Take 1 tablet (90 mg total) by mouth 2 (two) times daily.     flurazepam 15 MG Cap  Commonly known as: DALMANE  TAKE 1 TO 2 CAPSULES BY MOUTH AT BEDTIME AS NEEDED FOR SLEEP     gabapentin 600 MG tablet  Commonly known as: NEURONTIN  Take 600 mg by mouth 2 (two) times daily.     levothyroxine 50 MCG tablet  Commonly known as: SYNTHROID  before breakfast.     pentoxifylline 400 mg Tbsr  Commonly known as: TRENTAL  1 po tid     VITAMIN B-6 100 MG Tab  Generic drug: pyridoxine (vitamin B6)  Take 50 mg by mouth once daily.            Indwelling Lines/Drains at time of discharge:   Lines/Drains/Airways     None                 Time spent on the discharge of patient: 24 minutes  Patient was seen and examined on the date of discharge and determined to be suitable for discharge.         Filippo Simmons MD  Department of Hospital Medicine  [facility]

## 2020-12-26 NOTE — HOSPITAL COURSE
She was given intravenous antibiotics.  She was seen by Infectious Disease physician Dr. Jama.  She assessed the skin findings as vasculitis rash with etiology undetermined.  She took an aerobic culture of one of the tibial lesions.  Lab tests were ordered, including MARLENE, complement levels, and cryglobulin level.  MARLENE was 1:160, speckled pattern.  C3 and C4 were normal.  Cryo level still in process.  Evita recommended continuing clindamycin via oral route.  The rash was somewhat improved on 2nd day of hospital stay.  Steroids were not indicated at the time.  She was discharged home in good condition.  She was instructed to establish care with dermatology again.    Physical Exam  Vitals signs and nursing note reviewed.   Constitutional:       General: She is in acute distress (Appears to be in pain).      Appearance: She is well-developed. She is obese. She is not toxic-appearing or diaphoretic.   HENT:      Head: Normocephalic and atraumatic.      Right Ear: External ear normal.      Left Ear: External ear normal.      Mouth/Throat:      Mouth: Mucous membranes are moist.   Eyes:      Pupils: Pupils are equal, round, and reactive to light.   Neck:      Musculoskeletal: Normal range of motion.      Vascular: No JVD.   Cardiovascular:      Rate and Rhythm: Normal rate and regular rhythm.      Heart sounds: No murmur.   Pulmonary:      Effort: Pulmonary effort is normal. No respiratory distress.      Breath sounds: Normal breath sounds. No wheezing, rhonchi or rales.

## 2020-12-28 LAB
ANCA AB TITR SER IF: NORMAL TITER
BACTERIA BLD CULT: NORMAL
BACTERIA BLD CULT: NORMAL
BACTERIA SPEC AEROBE CULT: ABNORMAL
P-ANCA TITR SER IF: NORMAL TITER

## 2020-12-29 ENCOUNTER — HOSPITAL ENCOUNTER (OUTPATIENT)
Dept: RADIOLOGY | Facility: HOSPITAL | Age: 75
Discharge: HOME | End: 2020-12-29
Attending: UROLOGY
Payer: MEDICARE

## 2020-12-29 DIAGNOSIS — C68.9 MALIGNANT NEOPLASM OF URINARY ORGAN, UNSPECIFIED: ICD-10-CM

## 2020-12-29 LAB
ANTI SM ANTIBODY: 0.06 RATIO (ref 0–0.99)
ANTI SM/RNP ANTIBODY: 0.05 RATIO (ref 0–0.99)
ANTI-SM INTERPRETATION: NEGATIVE
ANTI-SM/RNP INTERPRETATION: NEGATIVE
ANTI-SSA ANTIBODY: 0.06 RATIO (ref 0–0.99)
ANTI-SSA INTERPRETATION: NEGATIVE
ANTI-SSB ANTIBODY: 0.08 RATIO (ref 0–0.99)
ANTI-SSB INTERPRETATION: NEGATIVE
DSDNA AB SER-ACNC: NORMAL [IU]/ML

## 2020-12-29 PROCEDURE — 25500020 PHARM REV CODE 255

## 2020-12-29 PROCEDURE — 74177 CT ABD & PELVIS W/CONTRAST: CPT | Mod: TC

## 2020-12-29 PROCEDURE — 71260 CT CHEST ABDOMEN PELVIS WITH CONTRAST (XPD): ICD-10-PCS | Mod: 26,,, | Performed by: RADIOLOGY

## 2020-12-29 PROCEDURE — 74177 CT CHEST ABDOMEN PELVIS WITH CONTRAST (XPD): ICD-10-PCS | Mod: 26,,, | Performed by: RADIOLOGY

## 2020-12-29 PROCEDURE — A9698 NON-RAD CONTRAST MATERIALNOC: HCPCS

## 2020-12-29 PROCEDURE — 71260 CT THORAX DX C+: CPT | Mod: 26,,, | Performed by: RADIOLOGY

## 2020-12-29 PROCEDURE — 74177 CT ABD & PELVIS W/CONTRAST: CPT | Mod: 26,,, | Performed by: RADIOLOGY

## 2020-12-29 RX ADMIN — IOHEXOL 1000 ML: 12 SOLUTION ORAL at 12:12

## 2020-12-29 RX ADMIN — IOHEXOL 100 ML: 350 INJECTION, SOLUTION INTRAVENOUS at 12:12

## 2021-01-05 ENCOUNTER — TELEPHONE (OUTPATIENT)
Dept: UROLOGY | Facility: CLINIC | Age: 76
End: 2021-01-05

## 2021-01-05 ENCOUNTER — OFFICE VISIT (OUTPATIENT)
Dept: UROLOGY | Facility: CLINIC | Age: 76
End: 2021-01-05
Payer: MEDICARE

## 2021-01-05 VITALS — TEMPERATURE: 98 F | WEIGHT: 223.13 LBS | BODY MASS INDEX: 41.06 KG/M2 | HEIGHT: 62 IN

## 2021-01-05 DIAGNOSIS — Z85.528 HISTORY OF RENAL CELL CANCER: Primary | ICD-10-CM

## 2021-01-05 DIAGNOSIS — E66.01 MORBID OBESITY: ICD-10-CM

## 2021-01-05 PROCEDURE — 3008F PR BODY MASS INDEX (BMI) DOCUMENTED: ICD-10-PCS | Mod: CPTII,S$GLB,, | Performed by: UROLOGY

## 2021-01-05 PROCEDURE — 99999 PR PBB SHADOW E&M-EST. PATIENT-LVL III: ICD-10-PCS | Mod: PBBFAC,,, | Performed by: UROLOGY

## 2021-01-05 PROCEDURE — 1159F PR MEDICATION LIST DOCUMENTED IN MEDICAL RECORD: ICD-10-PCS | Mod: S$GLB,,, | Performed by: UROLOGY

## 2021-01-05 PROCEDURE — 1159F MED LIST DOCD IN RCRD: CPT | Mod: S$GLB,,, | Performed by: UROLOGY

## 2021-01-05 PROCEDURE — 3008F BODY MASS INDEX DOCD: CPT | Mod: CPTII,S$GLB,, | Performed by: UROLOGY

## 2021-01-05 PROCEDURE — 3288F PR FALLS RISK ASSESSMENT DOCUMENTED: ICD-10-PCS | Mod: CPTII,S$GLB,, | Performed by: UROLOGY

## 2021-01-05 PROCEDURE — 81002 URINALYSIS NONAUTO W/O SCOPE: CPT | Mod: S$GLB,,, | Performed by: UROLOGY

## 2021-01-05 PROCEDURE — 1101F PR PT FALLS ASSESS DOC 0-1 FALLS W/OUT INJ PAST YR: ICD-10-PCS | Mod: CPTII,S$GLB,, | Performed by: UROLOGY

## 2021-01-05 PROCEDURE — 1101F PT FALLS ASSESS-DOCD LE1/YR: CPT | Mod: CPTII,S$GLB,, | Performed by: UROLOGY

## 2021-01-05 PROCEDURE — 99213 PR OFFICE/OUTPT VISIT, EST, LEVL III, 20-29 MIN: ICD-10-PCS | Mod: 25,S$GLB,, | Performed by: UROLOGY

## 2021-01-05 PROCEDURE — 81002 POCT URINE DIPSTICK WITHOUT MICROSCOPE: ICD-10-PCS | Mod: S$GLB,,, | Performed by: UROLOGY

## 2021-01-05 PROCEDURE — 1126F PR PAIN SEVERITY QUANTIFIED, NO PAIN PRESENT: ICD-10-PCS | Mod: S$GLB,,, | Performed by: UROLOGY

## 2021-01-05 PROCEDURE — 99999 PR PBB SHADOW E&M-EST. PATIENT-LVL III: CPT | Mod: PBBFAC,,, | Performed by: UROLOGY

## 2021-01-05 PROCEDURE — 3288F FALL RISK ASSESSMENT DOCD: CPT | Mod: CPTII,S$GLB,, | Performed by: UROLOGY

## 2021-01-05 PROCEDURE — 1126F AMNT PAIN NOTED NONE PRSNT: CPT | Mod: S$GLB,,, | Performed by: UROLOGY

## 2021-01-05 PROCEDURE — 99213 OFFICE O/P EST LOW 20 MIN: CPT | Mod: 25,S$GLB,, | Performed by: UROLOGY

## 2021-01-05 RX ORDER — IBUPROFEN 800 MG/1
TABLET ORAL
COMMUNITY
Start: 2020-12-17

## 2021-01-05 RX ORDER — ESCITALOPRAM OXALATE 20 MG/1
TABLET ORAL
COMMUNITY
Start: 2020-11-04

## 2021-01-06 LAB
BILIRUB SERPL-MCNC: ABNORMAL MG/DL
BLOOD URINE, POC: ABNORMAL
CLARITY, POC UA: CLEAR
COLOR, POC UA: YELLOW
CRYOGLOB SER QL: NORMAL
GLUCOSE UR QL STRIP: ABNORMAL
KETONES UR QL STRIP: ABNORMAL
LEUKOCYTE ESTERASE URINE, POC: ABNORMAL
NITRITE, POC UA: ABNORMAL
PH, POC UA: 5.5
PROTEIN, POC: ABNORMAL
SPECIFIC GRAVITY, POC UA: 1.02
UROBILINOGEN, POC UA: 0.2

## 2021-01-08 ENCOUNTER — TELEPHONE (OUTPATIENT)
Dept: HEMATOLOGY/ONCOLOGY | Facility: CLINIC | Age: 76
End: 2021-01-08

## 2021-01-14 ENCOUNTER — OFFICE VISIT (OUTPATIENT)
Dept: HEMATOLOGY/ONCOLOGY | Facility: CLINIC | Age: 76
End: 2021-01-14
Payer: MEDICARE

## 2021-01-14 VITALS
HEART RATE: 59 BPM | DIASTOLIC BLOOD PRESSURE: 78 MMHG | SYSTOLIC BLOOD PRESSURE: 135 MMHG | WEIGHT: 212.5 LBS | TEMPERATURE: 98 F | BODY MASS INDEX: 38.87 KG/M2

## 2021-01-14 DIAGNOSIS — L97.929 ULCERS OF BOTH LOWER LEGS: ICD-10-CM

## 2021-01-14 DIAGNOSIS — L97.919 ULCERS OF BOTH LOWER LEGS: ICD-10-CM

## 2021-01-14 DIAGNOSIS — D72.829 LEUKOCYTOSIS, UNSPECIFIED TYPE: ICD-10-CM

## 2021-01-14 DIAGNOSIS — C64.1 RENAL CELL CANCER, RIGHT: Primary | ICD-10-CM

## 2021-01-14 PROCEDURE — 3288F PR FALLS RISK ASSESSMENT DOCUMENTED: ICD-10-PCS | Mod: S$GLB,,, | Performed by: INTERNAL MEDICINE

## 2021-01-14 PROCEDURE — 3075F PR MOST RECENT SYSTOLIC BLOOD PRESS GE 130-139MM HG: ICD-10-PCS | Mod: S$GLB,,, | Performed by: INTERNAL MEDICINE

## 2021-01-14 PROCEDURE — 3008F PR BODY MASS INDEX (BMI) DOCUMENTED: ICD-10-PCS | Mod: S$GLB,,, | Performed by: INTERNAL MEDICINE

## 2021-01-14 PROCEDURE — 1101F PT FALLS ASSESS-DOCD LE1/YR: CPT | Mod: S$GLB,,, | Performed by: INTERNAL MEDICINE

## 2021-01-14 PROCEDURE — 99214 OFFICE O/P EST MOD 30 MIN: CPT | Mod: S$GLB,,, | Performed by: INTERNAL MEDICINE

## 2021-01-14 PROCEDURE — 1126F AMNT PAIN NOTED NONE PRSNT: CPT | Mod: S$GLB,,, | Performed by: INTERNAL MEDICINE

## 2021-01-14 PROCEDURE — 1101F PR PT FALLS ASSESS DOC 0-1 FALLS W/OUT INJ PAST YR: ICD-10-PCS | Mod: S$GLB,,, | Performed by: INTERNAL MEDICINE

## 2021-01-14 PROCEDURE — 3078F PR MOST RECENT DIASTOLIC BLOOD PRESSURE < 80 MM HG: ICD-10-PCS | Mod: S$GLB,,, | Performed by: INTERNAL MEDICINE

## 2021-01-14 PROCEDURE — 3075F SYST BP GE 130 - 139MM HG: CPT | Mod: S$GLB,,, | Performed by: INTERNAL MEDICINE

## 2021-01-14 PROCEDURE — 3078F DIAST BP <80 MM HG: CPT | Mod: S$GLB,,, | Performed by: INTERNAL MEDICINE

## 2021-01-14 PROCEDURE — 99214 PR OFFICE/OUTPT VISIT, EST, LEVL IV, 30-39 MIN: ICD-10-PCS | Mod: S$GLB,,, | Performed by: INTERNAL MEDICINE

## 2021-01-14 PROCEDURE — 3008F BODY MASS INDEX DOCD: CPT | Mod: S$GLB,,, | Performed by: INTERNAL MEDICINE

## 2021-01-14 PROCEDURE — 1126F PR PAIN SEVERITY QUANTIFIED, NO PAIN PRESENT: ICD-10-PCS | Mod: S$GLB,,, | Performed by: INTERNAL MEDICINE

## 2021-01-14 PROCEDURE — 3288F FALL RISK ASSESSMENT DOCD: CPT | Mod: S$GLB,,, | Performed by: INTERNAL MEDICINE

## 2021-01-14 PROCEDURE — 1159F PR MEDICATION LIST DOCUMENTED IN MEDICAL RECORD: ICD-10-PCS | Mod: S$GLB,,, | Performed by: INTERNAL MEDICINE

## 2021-01-14 PROCEDURE — 1159F MED LIST DOCD IN RCRD: CPT | Mod: S$GLB,,, | Performed by: INTERNAL MEDICINE

## 2021-09-29 ENCOUNTER — OFFICE VISIT (OUTPATIENT)
Dept: HEMATOLOGY/ONCOLOGY | Facility: CLINIC | Age: 76
End: 2021-09-29
Payer: MEDICARE

## 2021-09-29 VITALS
SYSTOLIC BLOOD PRESSURE: 133 MMHG | HEIGHT: 60 IN | BODY MASS INDEX: 42.8 KG/M2 | HEART RATE: 66 BPM | DIASTOLIC BLOOD PRESSURE: 72 MMHG | RESPIRATION RATE: 18 BRPM | WEIGHT: 218 LBS

## 2021-09-29 DIAGNOSIS — C64.1 RENAL CELL CANCER, RIGHT: Primary | ICD-10-CM

## 2021-09-29 DIAGNOSIS — R13.19 DYSPHAGIA CAUSING PULMONARY ASPIRATION WITH SWALLOWING: Primary | ICD-10-CM

## 2021-09-29 DIAGNOSIS — R91.8 MULTIPLE LUNG NODULES: ICD-10-CM

## 2021-09-29 DIAGNOSIS — R13.10 DYSPHAGIA, UNSPECIFIED TYPE: ICD-10-CM

## 2021-09-29 DIAGNOSIS — C64.1 RENAL CELL CANCER, RIGHT: ICD-10-CM

## 2021-09-29 PROCEDURE — 1101F PT FALLS ASSESS-DOCD LE1/YR: CPT | Mod: S$GLB,,, | Performed by: INTERNAL MEDICINE

## 2021-09-29 PROCEDURE — 3075F SYST BP GE 130 - 139MM HG: CPT | Mod: S$GLB,,, | Performed by: INTERNAL MEDICINE

## 2021-09-29 PROCEDURE — 3078F DIAST BP <80 MM HG: CPT | Mod: S$GLB,,, | Performed by: INTERNAL MEDICINE

## 2021-09-29 PROCEDURE — 3288F FALL RISK ASSESSMENT DOCD: CPT | Mod: S$GLB,,, | Performed by: INTERNAL MEDICINE

## 2021-09-29 PROCEDURE — 1101F PR PT FALLS ASSESS DOC 0-1 FALLS W/OUT INJ PAST YR: ICD-10-PCS | Mod: S$GLB,,, | Performed by: INTERNAL MEDICINE

## 2021-09-29 PROCEDURE — 3075F PR MOST RECENT SYSTOLIC BLOOD PRESS GE 130-139MM HG: ICD-10-PCS | Mod: S$GLB,,, | Performed by: INTERNAL MEDICINE

## 2021-09-29 PROCEDURE — 99214 OFFICE O/P EST MOD 30 MIN: CPT | Mod: S$GLB,,, | Performed by: INTERNAL MEDICINE

## 2021-09-29 PROCEDURE — 3288F PR FALLS RISK ASSESSMENT DOCUMENTED: ICD-10-PCS | Mod: S$GLB,,, | Performed by: INTERNAL MEDICINE

## 2021-09-29 PROCEDURE — 1126F PR PAIN SEVERITY QUANTIFIED, NO PAIN PRESENT: ICD-10-PCS | Mod: S$GLB,,, | Performed by: INTERNAL MEDICINE

## 2021-09-29 PROCEDURE — 99214 PR OFFICE/OUTPT VISIT, EST, LEVL IV, 30-39 MIN: ICD-10-PCS | Mod: S$GLB,,, | Performed by: INTERNAL MEDICINE

## 2021-09-29 PROCEDURE — 1159F MED LIST DOCD IN RCRD: CPT | Mod: S$GLB,,, | Performed by: INTERNAL MEDICINE

## 2021-09-29 PROCEDURE — 3078F PR MOST RECENT DIASTOLIC BLOOD PRESSURE < 80 MM HG: ICD-10-PCS | Mod: S$GLB,,, | Performed by: INTERNAL MEDICINE

## 2021-09-29 PROCEDURE — 1126F AMNT PAIN NOTED NONE PRSNT: CPT | Mod: S$GLB,,, | Performed by: INTERNAL MEDICINE

## 2021-09-29 PROCEDURE — 1159F PR MEDICATION LIST DOCUMENTED IN MEDICAL RECORD: ICD-10-PCS | Mod: S$GLB,,, | Performed by: INTERNAL MEDICINE

## 2021-12-15 ENCOUNTER — OFFICE VISIT (OUTPATIENT)
Dept: HEMATOLOGY/ONCOLOGY | Facility: CLINIC | Age: 76
End: 2021-12-15
Payer: MEDICARE

## 2021-12-15 ENCOUNTER — TELEPHONE (OUTPATIENT)
Dept: HEMATOLOGY/ONCOLOGY | Facility: CLINIC | Age: 76
End: 2021-12-15

## 2021-12-15 VITALS
HEART RATE: 61 BPM | WEIGHT: 218 LBS | SYSTOLIC BLOOD PRESSURE: 121 MMHG | DIASTOLIC BLOOD PRESSURE: 69 MMHG | BODY MASS INDEX: 42.58 KG/M2

## 2021-12-15 DIAGNOSIS — C64.1 RENAL CELL CANCER, RIGHT: Primary | ICD-10-CM

## 2021-12-15 PROCEDURE — 99214 OFFICE O/P EST MOD 30 MIN: CPT | Mod: S$GLB,,, | Performed by: INTERNAL MEDICINE

## 2021-12-15 PROCEDURE — 99214 PR OFFICE/OUTPT VISIT, EST, LEVL IV, 30-39 MIN: ICD-10-PCS | Mod: S$GLB,,, | Performed by: INTERNAL MEDICINE

## 2022-06-15 ENCOUNTER — OFFICE VISIT (OUTPATIENT)
Dept: HEMATOLOGY/ONCOLOGY | Facility: CLINIC | Age: 77
End: 2022-06-15
Payer: MEDICARE

## 2022-06-15 VITALS
HEART RATE: 63 BPM | DIASTOLIC BLOOD PRESSURE: 59 MMHG | BODY MASS INDEX: 42.41 KG/M2 | WEIGHT: 216 LBS | HEIGHT: 60 IN | RESPIRATION RATE: 18 BRPM | SYSTOLIC BLOOD PRESSURE: 119 MMHG

## 2022-06-15 DIAGNOSIS — Z90.5 H/O RIGHT NEPHRECTOMY: ICD-10-CM

## 2022-06-15 DIAGNOSIS — C64.1 RENAL CELL CANCER, RIGHT: Primary | ICD-10-CM

## 2022-06-15 PROCEDURE — 99214 PR OFFICE/OUTPT VISIT, EST, LEVL IV, 30-39 MIN: ICD-10-PCS | Mod: S$GLB,,, | Performed by: INTERNAL MEDICINE

## 2022-06-15 PROCEDURE — 3074F SYST BP LT 130 MM HG: CPT | Mod: CPTII,S$GLB,, | Performed by: INTERNAL MEDICINE

## 2022-06-15 PROCEDURE — 3074F PR MOST RECENT SYSTOLIC BLOOD PRESSURE < 130 MM HG: ICD-10-PCS | Mod: CPTII,S$GLB,, | Performed by: INTERNAL MEDICINE

## 2022-06-15 PROCEDURE — 1125F AMNT PAIN NOTED PAIN PRSNT: CPT | Mod: CPTII,S$GLB,, | Performed by: INTERNAL MEDICINE

## 2022-06-15 PROCEDURE — 1101F PR PT FALLS ASSESS DOC 0-1 FALLS W/OUT INJ PAST YR: ICD-10-PCS | Mod: CPTII,S$GLB,, | Performed by: INTERNAL MEDICINE

## 2022-06-15 PROCEDURE — 1159F PR MEDICATION LIST DOCUMENTED IN MEDICAL RECORD: ICD-10-PCS | Mod: CPTII,S$GLB,, | Performed by: INTERNAL MEDICINE

## 2022-06-15 PROCEDURE — 1159F MED LIST DOCD IN RCRD: CPT | Mod: CPTII,S$GLB,, | Performed by: INTERNAL MEDICINE

## 2022-06-15 PROCEDURE — 99214 OFFICE O/P EST MOD 30 MIN: CPT | Mod: S$GLB,,, | Performed by: INTERNAL MEDICINE

## 2022-06-15 PROCEDURE — 3078F DIAST BP <80 MM HG: CPT | Mod: CPTII,S$GLB,, | Performed by: INTERNAL MEDICINE

## 2022-06-15 PROCEDURE — 3288F FALL RISK ASSESSMENT DOCD: CPT | Mod: CPTII,S$GLB,, | Performed by: INTERNAL MEDICINE

## 2022-06-15 PROCEDURE — 1125F PR PAIN SEVERITY QUANTIFIED, PAIN PRESENT: ICD-10-PCS | Mod: CPTII,S$GLB,, | Performed by: INTERNAL MEDICINE

## 2022-06-15 PROCEDURE — 1101F PT FALLS ASSESS-DOCD LE1/YR: CPT | Mod: CPTII,S$GLB,, | Performed by: INTERNAL MEDICINE

## 2022-06-15 PROCEDURE — 3288F PR FALLS RISK ASSESSMENT DOCUMENTED: ICD-10-PCS | Mod: CPTII,S$GLB,, | Performed by: INTERNAL MEDICINE

## 2022-06-15 PROCEDURE — 3078F PR MOST RECENT DIASTOLIC BLOOD PRESSURE < 80 MM HG: ICD-10-PCS | Mod: CPTII,S$GLB,, | Performed by: INTERNAL MEDICINE

## 2022-06-15 RX ORDER — MELOXICAM 15 MG/1
15 TABLET ORAL
COMMUNITY
Start: 2022-06-14 | End: 2023-06-14

## 2022-06-16 NOTE — PROGRESS NOTES
"[facility] hematology Oncology in office subsequent encounter note  6/15/22    Subjective:      Patient ID:   Chen Levine  77 y.o. female  1945   Asha Mondragon [place], [place]      Chief Complaint:   Renal cancer    HPI:  77 y.o. female with increased WBC chronically, 13,000-20,000.  She has spastic colitis hx with cramps.  HTN, asthma as a child.  Bladder cancer ,Dr. Jose Stahl, treated with surgery.  She has stress incontinence.  DVT hx in her 20's.    B6 was low, started on B 6 po.  Energy 5/10.    White blood cell count is running in the 12,400 range.    S/P neck fusion.  LBP with injections, residual numbness down R anterior Leg.  Partial hysterectomy.  Onset of menses age 13.  M0.    C/O arthritic sx L hand, wearing a splint, Dr. Ramirez.    CAT and U/S confirm 5.3 x 4.8 cm mass R kidney.  Per radiologist 80% probability of malignancy.  Referred to Dr. Mondragon of  for evaluation.  Her sister had nephrectomy for renal cancer.    She had  R renal mass surgery 20.     Renal Ca, 4 cm, renal vein involvement,  Margin clear, confined in kidney, grade 1.    She was to begin Sutent 25 mg daily adjuvantly and titrate up as tolerated.  But no financial aid or co pay assistance could be gotten.  She could not afford it, and went with observation.    Clinical Stage 3 dx.  CT scan at [facility] for 2021 of chest abdomen and pelvis was negative for recurrent or metastatic dx.  3.1 x 2.1 mass noted in stomach.  Check CAT 10/2022. [facility].    She had dilation of esophagus per Dr. Barnes 10/2021, sx decreased now.  Call Dr. Barnes for procedure note and path report.    She received 2 of 2 COVID vaccinations.  She has not had COVID infection.  She plans to take the COVID booster vaccine when available.  She has not had flu shot.  Daughter and ANTONI had covid 19, hospitalized, and recovered.    Hx of "boils" on legs, similar event 3 years ago.  Saw Dr." Evita.  Pyoderma lesions, enterococcus C/S 2017.  She saw Dr. Jama again.  C/S of lesions grew MRSA.     Status post hernia repair.  She is due to see Dr. Brothers for evaluation of PAD.    Dysphagia sx resolved after dilation procedure, Dr. Barnes.    Smoked 1/2 ppd x's 57 years.  218#.  She does not want referral to smoking clinic.ETOH no.  Allergy Phenergan, PCN, sulfa.  Job [employer], coffee worker.  [employer] worker.    Mom breast Ca, macular degeneration.  Mom is 95 y/o. Dad CVA.  Sister x's 2, LBP.  1 sister renal cancer.  Daughter Breast cancer, epilepsy, heart dx.  Daughter hypothyroid dx., cholesterol, 40# benign ovarian tumor.      ROS:   GEN: normal without any fever, night sweats or weight loss  HEENT: normal with no HA's, sore throat, stiff neck, changes in vision  CV: normal with no CP, SOB, PND, VIDES or orthopnea  PULM: normal with no SOB, cough, hemoptysis, sputum or pleuritic pain  GI: normal with no abdominal pain, nausea, vomiting, constipation, diarrhea, melanotic stools, BRBPR, or hematemesis  : See HPI  BREAST: normal with no mass, discharge, pain  SKIN: normal with no rash, erythema, bruising, or swelling     Past Medical History:   Diagnosis Date    Anxiety     Asthma     childhood currently resolved    Borderline diabetes     Cancer 1984    bladder    Chronic venous stasis     Colon polyps     Deep venous thrombosis 1970'S    In the leg    Degenerative disc disease at L5-S1 level     Depression     Hyperlipidemia     Hypertension     Obesity     Overactive bladder     Pyoderma [id number]    No histological diagnosis    Renal cell cancer, right 09/2019    R nephrostomy    Wears partial dentures     UPPER PARTIAL     Past Surgical History:   Procedure Laterality Date    BLADDER SURGERY  1985    c-spine fusion      EPIDURAL STEROID INJECTION  2019    x2     HYSTERECTOMY  1977    ROBOT-ASSISTED LAPAROSCOPIC NEPHRECTOMY Right 9/18/2019    Procedure: ROBOTIC  NEPHRECTOMY;  Surgeon: Manny Mondragon MD;  Location: [facility] OR;  Service: Urology;  Laterality: Right;    ROBOT-ASSISTED LAPAROSCOPIC REPAIR OF INCISIONAL HERNIA N/A 8/31/2020    Procedure: ROBOTIC REPAIR, HERNIA, INCISIONAL;  Surgeon: Agusto Jaramillo MD;  Location: [facility] OR;  Service: General;  Laterality: N/A;    SPINE SURGERY  1987       Review of patient's allergies indicates:   Allergen Reactions    Penicillins Itching and Rash    Promethazine Itching and Rash    Sulfa (sulfonamide antibiotics) Itching and Rash           Current Outpatient Medications:     acetaminophen (TYLENOL) 325 MG tablet, Take 2 tablets (650 mg total) by mouth every 6 (six) hours as needed for Pain., Disp: , Rfl: 0    atorvastatin (LIPITOR) 20 MG tablet, Take 1 tablet (20 mg total) by mouth Daily., Disp: 90 tablet, Rfl: 1    clobetasoL (TEMOVATE) 0.05 % cream, APPLY  CREAM TOPICALLY TO AFFECTED AREA TWICE DAILY, Disp: 60 g, Rfl: 0    diltiaZEM (CARDIZEM) 90 MG tablet, Take 1 tablet (90 mg total) by mouth 2 (two) times daily., Disp: 180 tablet, Rfl: 1    escitalopram oxalate (LEXAPRO) 20 MG tablet, , Disp: , Rfl:     flurazepam (DALMANE) 15 MG Cap, TAKE 1 TO 2 CAPSULES BY MOUTH AT BEDTIME AS NEEDED FOR SLEEP, Disp: , Rfl:     furosemide (LASIX) 40 MG tablet, Take 1 tablet (40 mg total) by mouth 3 (three) times a week. (Patient taking differently: Take 40 mg by mouth once daily.), Disp: 90 tablet, Rfl: 1    gabapentin (NEURONTIN) 600 MG tablet, Take 600 mg by mouth 2 (two) times daily. , Disp: , Rfl:     HYDROcodone-acetaminophen (NORCO) 5-325 mg per tablet, Take 2 tablets by mouth every 6 (six) hours as needed for Pain., Disp: 50 tablet, Rfl: 0    ibuprofen (ADVIL,MOTRIN) 800 MG tablet, , Disp: , Rfl:     levothyroxine (SYNTHROID) 50 MCG tablet, before breakfast. , Disp: , Rfl:     meloxicam (MOBIC) 15 MG tablet, Take 15 mg by mouth., Disp: , Rfl:     pentoxifylline (TRENTAL) 400 mg TbSR, 1 po tid, Disp: 90 tablet, Rfl:  1    pyridoxine, vitamin B6, (B-6) 100 MG Tab, Take 50 mg by mouth once daily., Disp: , Rfl:           Objective:   Vitals:  Blood pressure (!) 119/59, pulse 63, resp. rate 18, height 5' (1.524 m), weight 98 kg (216 lb).    Physical Examination: This is my last exam.  She did not agree to examination today.  She did not feel exam was necessary.   GEN: no apparent distress, comfortable  HEAD: atraumatic and normocephalic  EYES: no pallor, no icterus  ENT:  no pharyngeal erythema, external ears WNL; no nasal discharge  NECK: no masses, thyroid normal, trachea midline, no LAD/LN's, supple  CV: RRR with no murmur; normal pulse; normal S1 and S2; no pedal edema  CHEST: Normal respiratory effort; CTAB; normal breath sounds; no wheeze or crackles  ABDOM: nontender and nondistended; soft;  no rebound/guarding, incisions healing  MUSC/Skeletal: ROM normal; no crepitus; joints normal; no deformities   EXTREM: no clubbing, cyanosis, inflammation or swelling  SKIN:  Scabbed over lesions noted at the lower legs, with chronic stasis change noted  : no cvat  NEURO: grossly intact; motor/sensory WNL;  no tremors  PSYCH: normal mood, affect and behavior  LYMPH: normal cervical, supraclavicular, axillary and groin LN's  BREASTS: L & R no palpable mass    Path report as above.      Assessment:   (1) 77 y.o. female with diagnosis of WBC 13,800. Gradually increasing from 8,900 since 2/2017.  Slight increased monocytes.  On repeat determination the WBC 12,400. Now.  B6 was low at 2.7.  Began B6 50 mg daily.  Defer bone marrow exam for now.    (2)Consider leukemoid reaction, myeloproliferative syndrome including CML and CMML.    (3) 2 first degree relatives with Breast Cancer.  Discussed BRCA 1&2 testing.  Her insurance carrier would not authorize BRCA 1 or 2.    (4)TSH increased 5.43, Dr. Valadez to review thyroid status,  He started her on Synthroid.    (5)neuropathy sx at R anterior thigh.  Dr. Valadez aware, and to evaluate later, when  pt agrees.    (6)R renal Ca, Stage 3 dx, S/P radical nephrectomy.  Worked on financial aid  for Sutent 25 mg 1 po daily for 4/6 weeks x's 1 year.  Increase dosage as tolerated.  She could not afford sutent, co pay.    Evaluation of her swallowing and choking symptoms, led to EGD and esophageal dilation.     Call Dr. Barnes for procedure note and path report from EGD 10/2021.    CAT 10/2022.  RTC 10/2022.

## 2022-10-13 ENCOUNTER — OFFICE VISIT (OUTPATIENT)
Dept: HEMATOLOGY/ONCOLOGY | Facility: CLINIC | Age: 77
End: 2022-10-13
Payer: MEDICARE

## 2022-10-13 VITALS
RESPIRATION RATE: 18 BRPM | HEART RATE: 66 BPM | DIASTOLIC BLOOD PRESSURE: 70 MMHG | WEIGHT: 213.88 LBS | SYSTOLIC BLOOD PRESSURE: 108 MMHG | BODY MASS INDEX: 41.77 KG/M2 | TEMPERATURE: 97 F

## 2022-10-13 DIAGNOSIS — C64.1 RENAL CELL CANCER, RIGHT: Primary | ICD-10-CM

## 2022-10-13 DIAGNOSIS — Z12.31 BREAST CANCER SCREENING BY MAMMOGRAM: ICD-10-CM

## 2022-10-13 PROCEDURE — 3074F SYST BP LT 130 MM HG: CPT | Mod: CPTII,S$GLB,, | Performed by: INTERNAL MEDICINE

## 2022-10-13 PROCEDURE — 1126F AMNT PAIN NOTED NONE PRSNT: CPT | Mod: CPTII,S$GLB,, | Performed by: INTERNAL MEDICINE

## 2022-10-13 PROCEDURE — 3074F PR MOST RECENT SYSTOLIC BLOOD PRESSURE < 130 MM HG: ICD-10-PCS | Mod: CPTII,S$GLB,, | Performed by: INTERNAL MEDICINE

## 2022-10-13 PROCEDURE — 1159F MED LIST DOCD IN RCRD: CPT | Mod: CPTII,S$GLB,, | Performed by: INTERNAL MEDICINE

## 2022-10-13 PROCEDURE — 3288F PR FALLS RISK ASSESSMENT DOCUMENTED: ICD-10-PCS | Mod: CPTII,S$GLB,, | Performed by: INTERNAL MEDICINE

## 2022-10-13 PROCEDURE — 1126F PR PAIN SEVERITY QUANTIFIED, NO PAIN PRESENT: ICD-10-PCS | Mod: CPTII,S$GLB,, | Performed by: INTERNAL MEDICINE

## 2022-10-13 PROCEDURE — 1101F PR PT FALLS ASSESS DOC 0-1 FALLS W/OUT INJ PAST YR: ICD-10-PCS | Mod: CPTII,S$GLB,, | Performed by: INTERNAL MEDICINE

## 2022-10-13 PROCEDURE — 1159F PR MEDICATION LIST DOCUMENTED IN MEDICAL RECORD: ICD-10-PCS | Mod: CPTII,S$GLB,, | Performed by: INTERNAL MEDICINE

## 2022-10-13 PROCEDURE — 3078F PR MOST RECENT DIASTOLIC BLOOD PRESSURE < 80 MM HG: ICD-10-PCS | Mod: CPTII,S$GLB,, | Performed by: INTERNAL MEDICINE

## 2022-10-13 PROCEDURE — 1101F PT FALLS ASSESS-DOCD LE1/YR: CPT | Mod: CPTII,S$GLB,, | Performed by: INTERNAL MEDICINE

## 2022-10-13 PROCEDURE — 3078F DIAST BP <80 MM HG: CPT | Mod: CPTII,S$GLB,, | Performed by: INTERNAL MEDICINE

## 2022-10-13 PROCEDURE — 99214 PR OFFICE/OUTPT VISIT, EST, LEVL IV, 30-39 MIN: ICD-10-PCS | Mod: S$GLB,,, | Performed by: INTERNAL MEDICINE

## 2022-10-13 PROCEDURE — 99214 OFFICE O/P EST MOD 30 MIN: CPT | Mod: S$GLB,,, | Performed by: INTERNAL MEDICINE

## 2022-10-13 PROCEDURE — 3288F FALL RISK ASSESSMENT DOCD: CPT | Mod: CPTII,S$GLB,, | Performed by: INTERNAL MEDICINE

## 2022-10-13 NOTE — LETTER
October 16, 2022        Ethan Ramirez IV, MD  [address]   [place] [address]             [facility] - Hematology Oncology  [address] [facility] [address]  Phone: [phone]  Fax: [phone]   Patient: Chen Levine   MR Number: [id number]   YOB: 1945   Date of Visit: 10/13/2022       Dear Dr. Ramirez:    Thank you for referring Chen Levine to me for evaluation. Below are the relevant portions of my assessment and plan of care.            If you have questions, please do not hesitate to call me. I look forward to following Chen along with you.    Sincerely,      CARMEN Nunes MD           CC    No Recipients

## 2022-10-16 NOTE — PROGRESS NOTES
"[facility] hematology Oncology in office subsequent encounter note  10/13/22    Subjective:      Patient ID:   Chen Levine  77 y.o. female  1945   Asha Mondragon Joubert, [place], [place]      Chief Complaint:   Renal cancer    HPI:  77 y.o. female with increased WBC chronically, 13,000 recently.  She has spastic colitis hx with cramps.  HTN, asthma as a child.  Bladder cancer ,Dr. Jose Stahl, treated with surgery.  She has stress incontinence.  DVT hx in her 20's.    B6 was low, started on B 6 po.  Energy 5/10.    White blood cell count is running in the 13,400 range.    S/P neck fusion.  LBP with injections, residual numbness down R anterior Leg.  Partial hysterectomy.  Onset of menses age 13.  M0.    C/O arthritic sx L hand, wearing a splint, Dr. Ramirez.    CAT and U/S confirm 5.3 x 4.8 cm mass R kidney.  Per radiologist 80% probability of malignancy.  Referred to Dr. Mondragon of  for evaluation.  Her sister had nephrectomy for renal cancer.    She had  R renal mass surgery 19.     Renal Ca, 4 cm, renal vein involvement,  Margin clear, confined in kidney, grade 1.    She was to begin Sutent 25 mg daily adjuvantly and titrate up as tolerated.  But no financial aid or co pay assistance could be gotten.  She could not afford it, and went with observation.    Clinical Stage 3 dx.  CT scan at [facility] for 2021 of chest abdomen and pelvis was negative for recurrent or metastatic dx.  3.1 x 2.1 mass noted in stomach.  Check CAT 10/2022. [facility].    She had dilation of esophagus per Dr. Barnes 10/2021, sx decreased now.  Call Dr. Barnes for procedure note and path report.    She received 2 of 2 COVID vaccinations.  She has not had COVID infection.  She plans to take the COVID booster vaccine when available.  She has not had flu shot.  Daughter and ANTONI had covid 19, hospitalized, and recovered.    Hx of "boils" on legs, similar event 3 years ago.  " Saw Dr. Jama.  Pyoderma lesions, enterococcus C/S 2017.  She saw Dr. Jama again.  C/S of lesions grew MRSA.     Status post hernia repair.  She is due to see Dr. Brothers for evaluation of PAD.    Dysphagia sx resolved after dilation procedure, Dr. Barnes.    Smoked 1/2 ppd x's 57 years.  218#.  She does not want referral to smoking clinic.ETOH no.  Allergy Phenergan, PCN, sulfa.  Job [employer], coffee worker.  [employer] worker.    Mom breast Ca, macular degeneration.  Mom is 95 y/o. Dad CVA.  Sister x's 2, LBP.  1 sister renal cancer.  Daughter Breast cancer, epilepsy, heart dx.  Daughter hypothyroid dx., cholesterol, 40# benign ovarian tumor.      ROS:   GEN: normal without any fever, night sweats or weight loss  HEENT: normal with no HA's, sore throat, stiff neck, changes in vision  CV: normal with no CP, SOB, PND, VIDES or orthopnea  PULM: normal with no SOB, cough, hemoptysis, sputum or pleuritic pain  GI: normal with no abdominal pain, nausea, vomiting, constipation, diarrhea, melanotic stools, BRBPR, or hematemesis  : See HPI  BREAST: normal with no mass, discharge, pain  SKIN: normal with no rash, erythema, bruising, or swelling     Past Medical History:   Diagnosis Date    Anxiety     Asthma     childhood currently resolved    Borderline diabetes     Cancer 1984    bladder    Chronic venous stasis     Colon polyps     Deep venous thrombosis 1970'S    In the leg    Degenerative disc disease at L5-S1 level     Depression     Hyperlipidemia     Hypertension     Obesity     Overactive bladder     Pyoderma [id number]    No histological diagnosis    Renal cell cancer, right 09/2019    R nephrostomy    Wears partial dentures     UPPER PARTIAL     Past Surgical History:   Procedure Laterality Date    BLADDER SURGERY  1985    c-spine fusion      EPIDURAL STEROID INJECTION  2019    x2     HYSTERECTOMY  1977    ROBOT-ASSISTED LAPAROSCOPIC NEPHRECTOMY Right 9/18/2019    Procedure: ROBOTIC NEPHRECTOMY;   Surgeon: Manny Mondragon MD;  Location: [facility] OR;  Service: Urology;  Laterality: Right;    ROBOT-ASSISTED LAPAROSCOPIC REPAIR OF INCISIONAL HERNIA N/A 8/31/2020    Procedure: ROBOTIC REPAIR, HERNIA, INCISIONAL;  Surgeon: Agusto Jaramillo MD;  Location: [facility] OR;  Service: General;  Laterality: N/A;    SPINE SURGERY  1987       Review of patient's allergies indicates:   Allergen Reactions    Penicillins Itching and Rash    Promethazine Itching and Rash    Sulfa (sulfonamide antibiotics) Itching and Rash           Current Outpatient Medications:     acetaminophen (TYLENOL) 325 MG tablet, Take 2 tablets (650 mg total) by mouth every 6 (six) hours as needed for Pain., Disp: , Rfl: 0    atorvastatin (LIPITOR) 20 MG tablet, Take 1 tablet (20 mg total) by mouth Daily., Disp: 90 tablet, Rfl: 1    clobetasoL (TEMOVATE) 0.05 % cream, APPLY  CREAM TOPICALLY TO AFFECTED AREA TWICE DAILY, Disp: 60 g, Rfl: 0    diltiaZEM (CARDIZEM) 90 MG tablet, Take 1 tablet (90 mg total) by mouth 2 (two) times daily., Disp: 180 tablet, Rfl: 1    escitalopram oxalate (LEXAPRO) 20 MG tablet, , Disp: , Rfl:     flurazepam (DALMANE) 15 MG Cap, TAKE 1 TO 2 CAPSULES BY MOUTH AT BEDTIME AS NEEDED FOR SLEEP, Disp: , Rfl:     furosemide (LASIX) 40 MG tablet, Take 1 tablet (40 mg total) by mouth 3 (three) times a week. (Patient taking differently: Take 40 mg by mouth once daily.), Disp: 90 tablet, Rfl: 1    gabapentin (NEURONTIN) 600 MG tablet, Take 600 mg by mouth 2 (two) times daily. , Disp: , Rfl:     HYDROcodone-acetaminophen (NORCO) 5-325 mg per tablet, Take 2 tablets by mouth every 6 (six) hours as needed for Pain., Disp: 50 tablet, Rfl: 0    ibuprofen (ADVIL,MOTRIN) 800 MG tablet, , Disp: , Rfl:     levothyroxine (SYNTHROID) 50 MCG tablet, before breakfast. , Disp: , Rfl:     meloxicam (MOBIC) 15 MG tablet, Take 15 mg by mouth., Disp: , Rfl:     pentoxifylline (TRENTAL) 400 mg TbSR, 1 po tid, Disp: 90 tablet, Rfl: 1    pyridoxine, vitamin B6,  (B-6) 100 MG Tab, Take 50 mg by mouth once daily., Disp: , Rfl:           Objective:   Vitals:  Blood pressure 108/70, pulse 66, temperature 97.3 °F (36.3 °C), resp. rate 18, weight 97 kg (213 lb 14.4 oz).    Physical Examination: This is my last exam.  She did not agree to examination today.  She did not feel exam was necessary.   GEN: no apparent distress, comfortable  HEAD: atraumatic and normocephalic  EYES: no pallor, no icterus  ENT:  no pharyngeal erythema, external ears WNL; no nasal discharge  NECK: no masses, thyroid normal, trachea midline, no LAD/LN's, supple  CV: RRR with no murmur; normal pulse; normal S1 and S2; no pedal edema  CHEST: Normal respiratory effort; CTAB; normal breath sounds; no wheeze or crackles  ABDOM: nontender and nondistended; soft;  no rebound/guarding, incisions healing  MUSC/Skeletal: ROM normal; no crepitus; joints normal; no deformities   EXTREM: no clubbing, cyanosis, inflammation or swelling  SKIN:  Scabbed over lesions noted at the lower legs, with chronic stasis change noted  : no cvat  NEURO: grossly intact; motor/sensory WNL;  no tremors  PSYCH: normal mood, affect and behavior  LYMPH: normal cervical, supraclavicular, axillary and groin LN's  BREASTS: L & R no palpable mass    Path report as above.      Assessment:   (1) 77 y.o. female with diagnosis of WBC 13,800. Gradually increasing from 8,900 since 2/2017.  Slight increased monocytes.  On repeat determination the WBC 13,000 Now.  B6 was low at 2.7.  Began B6 50 mg daily.  Defer bone marrow exam for now.    (2)Consider leukemoid reaction, myeloproliferative syndrome including CML and CMML.    (3) 2 first degree relatives with Breast Cancer.  Discussed BRCA 1&2 testing.  Her insurance carrier would not authorize BRCA 1 or 2.    (4)TSH increased 5.43, Dr. Valadez to review thyroid status,  He started her on Synthroid.    (5)neuropathy sx at R anterior thigh.  Dr. Valadez aware, and to evaluate later, when pt  agrees.    (6)R renal Ca, Stage 3 dx, S/P radical nephrectomy.  Worked on financial aid  for Sutent 25 mg 1 po daily for 4/6 weeks x's 1 year.  Increase dosage as tolerated.  She could not afford sutent, co pay.    Check CAT of chest/abdomin/Pelvis and Mammogram 4 months.    RTC 4 months.

## 2023-03-15 ENCOUNTER — OFFICE VISIT (OUTPATIENT)
Dept: HEMATOLOGY/ONCOLOGY | Facility: CLINIC | Age: 78
End: 2023-03-15
Payer: MEDICARE

## 2023-03-15 VITALS
RESPIRATION RATE: 17 BRPM | SYSTOLIC BLOOD PRESSURE: 133 MMHG | BODY MASS INDEX: 40.4 KG/M2 | HEART RATE: 67 BPM | TEMPERATURE: 98 F | WEIGHT: 214 LBS | HEIGHT: 61 IN | DIASTOLIC BLOOD PRESSURE: 75 MMHG

## 2023-03-15 DIAGNOSIS — C64.1 RENAL CELL CANCER, RIGHT: Primary | ICD-10-CM

## 2023-03-15 PROCEDURE — 1159F PR MEDICATION LIST DOCUMENTED IN MEDICAL RECORD: ICD-10-PCS | Mod: CPTII,S$GLB,, | Performed by: INTERNAL MEDICINE

## 2023-03-15 PROCEDURE — 3288F PR FALLS RISK ASSESSMENT DOCUMENTED: ICD-10-PCS | Mod: CPTII,S$GLB,, | Performed by: INTERNAL MEDICINE

## 2023-03-15 PROCEDURE — 3075F SYST BP GE 130 - 139MM HG: CPT | Mod: CPTII,S$GLB,, | Performed by: INTERNAL MEDICINE

## 2023-03-15 PROCEDURE — 1101F PT FALLS ASSESS-DOCD LE1/YR: CPT | Mod: CPTII,S$GLB,, | Performed by: INTERNAL MEDICINE

## 2023-03-15 PROCEDURE — 99214 PR OFFICE/OUTPT VISIT, EST, LEVL IV, 30-39 MIN: ICD-10-PCS | Mod: S$GLB,,, | Performed by: INTERNAL MEDICINE

## 2023-03-15 PROCEDURE — 3078F PR MOST RECENT DIASTOLIC BLOOD PRESSURE < 80 MM HG: ICD-10-PCS | Mod: CPTII,S$GLB,, | Performed by: INTERNAL MEDICINE

## 2023-03-15 PROCEDURE — 1126F PR PAIN SEVERITY QUANTIFIED, NO PAIN PRESENT: ICD-10-PCS | Mod: CPTII,S$GLB,, | Performed by: INTERNAL MEDICINE

## 2023-03-15 PROCEDURE — 1126F AMNT PAIN NOTED NONE PRSNT: CPT | Mod: CPTII,S$GLB,, | Performed by: INTERNAL MEDICINE

## 2023-03-15 PROCEDURE — 3078F DIAST BP <80 MM HG: CPT | Mod: CPTII,S$GLB,, | Performed by: INTERNAL MEDICINE

## 2023-03-15 PROCEDURE — 1159F MED LIST DOCD IN RCRD: CPT | Mod: CPTII,S$GLB,, | Performed by: INTERNAL MEDICINE

## 2023-03-15 PROCEDURE — 1101F PR PT FALLS ASSESS DOC 0-1 FALLS W/OUT INJ PAST YR: ICD-10-PCS | Mod: CPTII,S$GLB,, | Performed by: INTERNAL MEDICINE

## 2023-03-15 PROCEDURE — 3075F PR MOST RECENT SYSTOLIC BLOOD PRESS GE 130-139MM HG: ICD-10-PCS | Mod: CPTII,S$GLB,, | Performed by: INTERNAL MEDICINE

## 2023-03-15 PROCEDURE — 99214 OFFICE O/P EST MOD 30 MIN: CPT | Mod: S$GLB,,, | Performed by: INTERNAL MEDICINE

## 2023-03-15 PROCEDURE — 3288F FALL RISK ASSESSMENT DOCD: CPT | Mod: CPTII,S$GLB,, | Performed by: INTERNAL MEDICINE

## 2023-03-19 NOTE — PROGRESS NOTES
"[facility] hematology Oncology in office subsequent encounter note  3/15/23    Subjective:      Patient ID:   Chen Levine  78 y.o. female  1945   Asha Mondragon Joubert, Albright, [place]      Chief Complaint:   Renal cancer    HPI:  78 y.o. female with increased WBC chronically, 13,000 recently.  She has spastic colitis hx with cramps.  HTN, asthma as a child.  Bladder cancer ,Dr. Jose Stahl, treated with surgery.  She has stress incontinence.  DVT hx in her 20's.    B6 was low, started on B 6 po.  Energy 5/10.    White blood cell count is running in the 13,400 range.    S/P neck fusion.  LBP with injections, residual numbness down R anterior Leg.  Partial hysterectomy.  Onset of menses age 13.  M0.    C/O arthritic sx L hand, wearing a splint, Dr. Ramirez.    CAT and U/S confirm 5.3 x 4.8 cm mass R kidney.  Per radiologist 80% probability of malignancy.  Referred to Dr. Mondragon of  for evaluation.  Her sister had nephrectomy for renal cancer.    She had  R renal mass surgery 19.     Renal Ca, 4 cm, renal vein involvement,  Margin clear, confined in kidney, grade 1.    She was to begin Sutent 25 mg daily adjuvantly and titrate up as tolerated.  But no financial aid or co pay assistance could be gotten.  She could not afford it, and went with observation.    Clinical Stage 3 dx.  CT scan at [facility] for 2021 of chest abdomen and pelvis was negative for recurrent or metastatic dx.  3.1 x 2.1 mass noted in stomach.  Check CAT 10/2022. [facility].    She had dilation of esophagus per Dr. Barnes 10/2021, sx decreased now.  Call Dr. Barnes for procedure note and path report.    She received 2 of 2 COVID vaccinations.  She has not had COVID infection.  She plans to take the COVID booster vaccine when available.  She has not had flu shot.  Daughter and ANTONI had covid 19, hospitalized, and recovered.    Hx of "boils" on legs, similar event 3 years ago.  " Saw Dr. Jama.  Pyoderma lesions, enterococcus C/S 2017.  She saw Dr. Jama again.  C/S of lesions grew MRSA.     Status post hernia repair.  She is due to see Dr. Brothers for evaluation of PAD.    Dysphagia sx resolved after dilation procedure, Dr. Barnes.    Smoked 1/2 ppd x's 57 years.  218#.  She does not want referral to smoking clinic.ETOH no.  Allergy Phenergan, PCN, sulfa.  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    Mom breast Ca, macular degeneration.  Mom is 97 y/o. Dad CVA.  Sister x's 2, LBP.  1 sister renal cancer.  Daughter Breast cancer, epilepsy, heart dx.  Daughter hypothyroid dx., cholesterol, 40# benign ovarian tumor.      ROS:   GEN: normal without any fever, night sweats or weight loss  HEENT: normal with no HA's, sore throat, stiff neck, changes in vision  CV: normal with no CP, SOB, PND, VIDES or orthopnea  PULM: normal with no SOB, cough, hemoptysis, sputum or pleuritic pain  GI: normal with no abdominal pain, nausea, vomiting, constipation, diarrhea, melanotic stools, BRBPR, or hematemesis  : See HPI  BREAST: normal with no mass, discharge, pain  SKIN: normal with no rash, erythema, bruising, or swelling     Past Medical History:   Diagnosis Date    Anxiety     Asthma     childhood currently resolved    Borderline diabetes     Cancer 1984    bladder    Chronic venous stasis     Colon polyps     Deep venous thrombosis 1970'S    In the leg    Degenerative disc disease at L5-S1 level     Depression     Hyperlipidemia     Hypertension     Obesity     Overactive bladder     Pyoderma [id number]    No histological diagnosis    Renal cell cancer, right 09/2019    R nephrostomy    Wears partial dentures     UPPER PARTIAL     Past Surgical History:   Procedure Laterality Date    BLADDER SURGERY  1985    c-spine fusion      EPIDURAL STEROID INJECTION  2019    x2     HYSTERECTOMY  1977    ROBOT-ASSISTED LAPAROSCOPIC NEPHRECTOMY Right 9/18/2019    Procedure: ROBOTIC NEPHRECTOMY;   Surgeon: Manny Mondragon MD;  Location: [facility] OR;  Service: Urology;  Laterality: Right;    ROBOT-ASSISTED LAPAROSCOPIC REPAIR OF INCISIONAL HERNIA N/A 8/31/2020    Procedure: ROBOTIC REPAIR, HERNIA, INCISIONAL;  Surgeon: Agusto Jaramillo MD;  Location: [facility] OR;  Service: General;  Laterality: N/A;    SPINE SURGERY  1987       Review of patient's allergies indicates:   Allergen Reactions    Penicillins Itching and Rash    Promethazine Itching and Rash    Sulfa (sulfonamide antibiotics) Itching and Rash           Current Outpatient Medications:     acetaminophen (TYLENOL) 325 MG tablet, Take 2 tablets (650 mg total) by mouth every 6 (six) hours as needed for Pain., Disp: , Rfl: 0    atorvastatin (LIPITOR) 20 MG tablet, Take 1 tablet (20 mg total) by mouth Daily., Disp: 90 tablet, Rfl: 1    clobetasoL (TEMOVATE) 0.05 % cream, APPLY  CREAM TOPICALLY TO AFFECTED AREA TWICE DAILY, Disp: 60 g, Rfl: 0    diltiaZEM (CARDIZEM) 90 MG tablet, Take 1 tablet (90 mg total) by mouth 2 (two) times daily., Disp: 180 tablet, Rfl: 1    escitalopram oxalate (LEXAPRO) 20 MG tablet, , Disp: , Rfl:     flurazepam (DALMANE) 15 MG Cap, TAKE 1 TO 2 CAPSULES BY MOUTH AT BEDTIME AS NEEDED FOR SLEEP, Disp: , Rfl:     furosemide (LASIX) 40 MG tablet, Take 1 tablet (40 mg total) by mouth 3 (three) times a week. (Patient taking differently: Take 40 mg by mouth once daily.), Disp: 90 tablet, Rfl: 1    gabapentin (NEURONTIN) 600 MG tablet, Take 600 mg by mouth 2 (two) times daily. , Disp: , Rfl:     HYDROcodone-acetaminophen (NORCO) 5-325 mg per tablet, Take 2 tablets by mouth every 6 (six) hours as needed for Pain., Disp: 50 tablet, Rfl: 0    ibuprofen (ADVIL,MOTRIN) 800 MG tablet, , Disp: , Rfl:     levothyroxine (SYNTHROID) 50 MCG tablet, before breakfast. , Disp: , Rfl:     meloxicam (MOBIC) 15 MG tablet, Take 15 mg by mouth., Disp: , Rfl:     pentoxifylline (TRENTAL) 400 mg TbSR, 1 po tid, Disp: 90 tablet, Rfl: 1    pyridoxine, vitamin B6,  "(B-6) 100 MG Tab, Take 50 mg by mouth once daily., Disp: , Rfl:           Objective:   Vitals:  Blood pressure 133/75, pulse 67, temperature 97.7 °F (36.5 °C), resp. rate 17, height 5' 1" (1.549 m), weight 97.1 kg (214 lb).    Physical Examination: This is my last exam.  She did not agree to examination today.  She did not feel exam was necessary.   GEN: no apparent distress, comfortable  HEAD: atraumatic and normocephalic  EYES: no pallor, no icterus  ENT:  no pharyngeal erythema, external ears WNL; no nasal discharge  NECK: no masses, thyroid normal, trachea midline, no LAD/LN's, supple  CV: RRR with no murmur; normal pulse; normal S1 and S2; no pedal edema  CHEST: Normal respiratory effort; CTAB; normal breath sounds; no wheeze or crackles  ABDOM: nontender and nondistended; soft;  no rebound/guarding, incisions healing  MUSC/Skeletal: ROM normal; no crepitus; joints normal; no deformities   EXTREM: no clubbing, cyanosis, inflammation or swelling  SKIN:  Scabbed over lesions noted at the lower legs, with chronic stasis change noted  : no cvat  NEURO: grossly intact; motor/sensory WNL;  no tremors  PSYCH: normal mood, affect and behavior  LYMPH: normal cervical, supraclavicular, axillary and groin LN's  BREASTS: L & R no palpable mass    Path report as above.  1/10/23 mamm negative  Dexa scan osteopenia    Assessment:   (1) 78 y.o. female with diagnosis of WBC 13,800. Gradually increasing from 8,900 since 2/2017.  Slight increased monocytes.  On repeat determination the WBC 13,000 Now.  B6 was low at 2.7.  Began B6 50 mg daily.  Defer bone marrow exam for now.    (2)Consider leukemoid reaction, myeloproliferative syndrome including CML and CMML.    (3) 2 first degree relatives with Breast Cancer.  Discussed BRCA 1&2 testing.  Her insurance carrier would not authorize BRCA 1 or 2.    (4)TSH increased 5.43, Dr. Valadez to review thyroid status,  He started her on Synthroid.    (5)neuropathy sx at R anterior thigh.  " Dr. Valadez aware, and to evaluate later, when pt agrees.    (6)R renal Ca, Stage 3 dx, S/P radical nephrectomy.  Worked on financial aid  for Sutent 25 mg 1 po daily for 4/6 weeks x's 1 year.  Increase dosage as tolerated.  She could not afford sutent, co pay.    Check CAT of chest/abdomin/Pelvis now.    RTC 1 months.  Can cancel.  RTC 1 year.

## 2023-04-11 ENCOUNTER — TELEPHONE (OUTPATIENT)
Dept: HEMATOLOGY/ONCOLOGY | Facility: CLINIC | Age: 78
End: 2023-04-11

## 2023-04-11 NOTE — TELEPHONE ENCOUNTER
Baljeetm to remind pt that she will need to get her CT scan done prior to her appt so Dr. Nunes can review the results with them for appt 4/18/23

## 2023-04-18 ENCOUNTER — TELEPHONE (OUTPATIENT)
Dept: HEMATOLOGY/ONCOLOGY | Facility: CLINIC | Age: 78
End: 2023-04-18

## 2023-04-18 NOTE — TELEPHONE ENCOUNTER
Dr Nunes reviewed patient's most recent CT scan and per his verbal order, patient notified that scan was stable and showed no evidence of recurrence or progression. Patient to follow up in 6 months. Patient made aware of above and verbalized understanding, transferred to scheduling to get scheduled.

## 2023-10-24 ENCOUNTER — OFFICE VISIT (OUTPATIENT)
Dept: HEMATOLOGY/ONCOLOGY | Facility: CLINIC | Age: 78
End: 2023-10-24
Payer: MEDICARE

## 2023-10-24 VITALS
WEIGHT: 205.31 LBS | DIASTOLIC BLOOD PRESSURE: 69 MMHG | SYSTOLIC BLOOD PRESSURE: 123 MMHG | BODY MASS INDEX: 38.79 KG/M2 | TEMPERATURE: 97 F | RESPIRATION RATE: 16 BRPM | HEART RATE: 73 BPM

## 2023-10-24 DIAGNOSIS — C64.1 RENAL CELL CANCER, RIGHT: Primary | ICD-10-CM

## 2023-10-24 DIAGNOSIS — Z90.5 H/O RIGHT NEPHRECTOMY: ICD-10-CM

## 2023-10-24 PROCEDURE — 99214 OFFICE O/P EST MOD 30 MIN: CPT | Mod: S$GLB,,, | Performed by: INTERNAL MEDICINE

## 2023-10-24 PROCEDURE — 1126F AMNT PAIN NOTED NONE PRSNT: CPT | Mod: CPTII,S$GLB,, | Performed by: INTERNAL MEDICINE

## 2023-10-24 PROCEDURE — 3078F PR MOST RECENT DIASTOLIC BLOOD PRESSURE < 80 MM HG: ICD-10-PCS | Mod: CPTII,S$GLB,, | Performed by: INTERNAL MEDICINE

## 2023-10-24 PROCEDURE — 1101F PR PT FALLS ASSESS DOC 0-1 FALLS W/OUT INJ PAST YR: ICD-10-PCS | Mod: CPTII,S$GLB,, | Performed by: INTERNAL MEDICINE

## 2023-10-24 PROCEDURE — 99214 PR OFFICE/OUTPT VISIT, EST, LEVL IV, 30-39 MIN: ICD-10-PCS | Mod: S$GLB,,, | Performed by: INTERNAL MEDICINE

## 2023-10-24 PROCEDURE — 3074F PR MOST RECENT SYSTOLIC BLOOD PRESSURE < 130 MM HG: ICD-10-PCS | Mod: CPTII,S$GLB,, | Performed by: INTERNAL MEDICINE

## 2023-10-24 PROCEDURE — 3078F DIAST BP <80 MM HG: CPT | Mod: CPTII,S$GLB,, | Performed by: INTERNAL MEDICINE

## 2023-10-24 PROCEDURE — 3074F SYST BP LT 130 MM HG: CPT | Mod: CPTII,S$GLB,, | Performed by: INTERNAL MEDICINE

## 2023-10-24 PROCEDURE — 1159F PR MEDICATION LIST DOCUMENTED IN MEDICAL RECORD: ICD-10-PCS | Mod: CPTII,S$GLB,, | Performed by: INTERNAL MEDICINE

## 2023-10-24 PROCEDURE — 1126F PR PAIN SEVERITY QUANTIFIED, NO PAIN PRESENT: ICD-10-PCS | Mod: CPTII,S$GLB,, | Performed by: INTERNAL MEDICINE

## 2023-10-24 PROCEDURE — 3288F FALL RISK ASSESSMENT DOCD: CPT | Mod: CPTII,S$GLB,, | Performed by: INTERNAL MEDICINE

## 2023-10-24 PROCEDURE — 1159F MED LIST DOCD IN RCRD: CPT | Mod: CPTII,S$GLB,, | Performed by: INTERNAL MEDICINE

## 2023-10-24 PROCEDURE — 3288F PR FALLS RISK ASSESSMENT DOCUMENTED: ICD-10-PCS | Mod: CPTII,S$GLB,, | Performed by: INTERNAL MEDICINE

## 2023-10-24 PROCEDURE — 1101F PT FALLS ASSESS-DOCD LE1/YR: CPT | Mod: CPTII,S$GLB,, | Performed by: INTERNAL MEDICINE

## 2023-10-29 NOTE — PROGRESS NOTES
"[facility] hematology Oncology in office subsequent encounter note  10/24/23    Subjective:      Patient ID:   Chen Levine  78 y.o. female  1945   Asha Mondragon Joubert, [place], [place]      Chief Complaint:   Renal cancer    HPI:  78 y.o. female with increased WBC chronically, 13,000 recently.  She has spastic colitis hx with cramps.  HTN, asthma as a child.  Bladder cancer ,Dr. Jose Stahl, treated with surgery.  She has stress incontinence.  DVT hx in her 20's.    B6 was low, started on B 6 po.  Energy 5/10.    White blood cell count is running in the 13,400 range.    S/P neck fusion.  LBP with injections, residual numbness down R anterior Leg.  Partial hysterectomy.  Onset of menses age 13.  M0.    C/O arthritic sx L hand, wearing a splint, Dr. Ramirez.    CAT and U/S confirm 5.3 x 4.8 cm mass R kidney.  Per radiologist 80% probability of malignancy.  Referred to Dr. Mondragon of  for evaluation.  Her sister had nephrectomy for renal cancer.    She had  R renal mass surgery 19.     Renal Ca, 4 cm, renal vein involvement,  Margin clear, confined in kidney, grade 1.    She was to begin Sutent 25 mg daily adjuvantly and titrate up as tolerated.  But no financial aid or co pay assistance could be gotten.  She could not afford it, and went with observation.    Clinical Stage 3 dx.  CT scan at [facility] for 2021 of chest abdomen and pelvis was negative for recurrent or metastatic dx.  3.1 x 2.1 mass noted in stomach.  Check CAT 10/2022. [facility].    She had dilation of esophagus per Dr. Barnes 10/2021, sx decreased now.  Call Dr. Barnes for procedure note and path report.    She received 2 of 2 COVID vaccinations.  She has not had COVID infection.  She plans to take the COVID booster vaccine when available.  She has not had flu shot.  Daughter and ANTONI had covid 19, hospitalized, and recovered.    Hx of "boils" on legs, similar event 3 years ago.  " Saw Dr. Jama.  Pyoderma lesions, enterococcus C/S 2017.  She saw Dr. Jama again.  C/S of lesions grew MRSA.     Status post hernia repair.  She is due to see Dr. Brothers for evaluation of PAD.    Dysphagia sx resolved after dilation procedure, Dr. Barnes.    She had a vascular stent placed L leg, Dr. Lal, now getting lymphedema Rx.  Renal cancer, now 4 years out.  CT CAP 4/13/23 negative for cancer.  Check CT 4/2024.    Smoked 1/2 ppd x's 57 years.  218#.  She does not want referral to smoking clinic.ETOH no.  Allergy Phenergan, PCN, sulfa.  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    Mom breast Ca, macular degeneration.  Mom is 97 y/o. Dad CVA.  Sister x's 2, LBP.  1 sister renal cancer.  Daughter Breast cancer, epilepsy, heart dx.  Daughter hypothyroid dx., cholesterol, 40# benign ovarian tumor.      ROS:   GEN: normal without any fever, night sweats or weight loss  HEENT: normal with no HA's, sore throat, stiff neck, changes in vision  CV: normal with no CP, SOB, PND, VIDES or orthopnea  PULM: normal with no SOB, cough, hemoptysis, sputum or pleuritic pain  GI: normal with no abdominal pain, nausea, vomiting, constipation, diarrhea, melanotic stools, BRBPR, or hematemesis  : See HPI  BREAST: normal with no mass, discharge, pain  SKIN: normal with no rash, erythema, bruising, or swelling     Past Medical History:   Diagnosis Date    Anxiety     Asthma     childhood currently resolved    Borderline diabetes     Cancer 1984    bladder    Chronic venous stasis     Colon polyps     Deep venous thrombosis 1970'S    In the leg    Degenerative disc disease at L5-S1 level     Depression     Hyperlipidemia     Hypertension     Obesity     Overactive bladder     Pyoderma [id number]    No histological diagnosis    Renal cell cancer, right 09/2019    R nephrostomy    Wears partial dentures     UPPER PARTIAL     Past Surgical History:   Procedure Laterality Date    BLADDER SURGERY  1985    c-spine  fusion      EPIDURAL STEROID INJECTION  2019    x2     HYSTERECTOMY  1977    ROBOT-ASSISTED LAPAROSCOPIC NEPHRECTOMY Right 9/18/2019    Procedure: ROBOTIC NEPHRECTOMY;  Surgeon: Manny Mondragon MD;  Location: [facility] OR;  Service: Urology;  Laterality: Right;    ROBOT-ASSISTED LAPAROSCOPIC REPAIR OF INCISIONAL HERNIA N/A 8/31/2020    Procedure: ROBOTIC REPAIR, HERNIA, INCISIONAL;  Surgeon: Agusto Jaramillo MD;  Location: [facility] OR;  Service: General;  Laterality: N/A;    SPINE SURGERY  1987       Review of patient's allergies indicates:   Allergen Reactions    Penicillins Itching and Rash    Promethazine Itching and Rash    Sulfa (sulfonamide antibiotics) Itching and Rash           Current Outpatient Medications:     acetaminophen (TYLENOL) 325 MG tablet, Take 2 tablets (650 mg total) by mouth every 6 (six) hours as needed for Pain., Disp: , Rfl: 0    atorvastatin (LIPITOR) 20 MG tablet, Take 1 tablet (20 mg total) by mouth Daily., Disp: 90 tablet, Rfl: 1    clobetasoL (TEMOVATE) 0.05 % cream, APPLY  CREAM TOPICALLY TO AFFECTED AREA TWICE DAILY, Disp: 60 g, Rfl: 0    diltiaZEM (CARDIZEM) 90 MG tablet, Take 1 tablet (90 mg total) by mouth 2 (two) times daily., Disp: 180 tablet, Rfl: 1    escitalopram oxalate (LEXAPRO) 20 MG tablet, , Disp: , Rfl:     flurazepam (DALMANE) 15 MG Cap, TAKE 1 TO 2 CAPSULES BY MOUTH AT BEDTIME AS NEEDED FOR SLEEP, Disp: , Rfl:     furosemide (LASIX) 40 MG tablet, Take 1 tablet (40 mg total) by mouth 3 (three) times a week. (Patient taking differently: Take 40 mg by mouth once daily.), Disp: 90 tablet, Rfl: 1    gabapentin (NEURONTIN) 600 MG tablet, Take 600 mg by mouth 2 (two) times daily. , Disp: , Rfl:     HYDROcodone-acetaminophen (NORCO) 5-325 mg per tablet, Take 2 tablets by mouth every 6 (six) hours as needed for Pain., Disp: 50 tablet, Rfl: 0    ibuprofen (ADVIL,MOTRIN) 800 MG tablet, , Disp: , Rfl:     levothyroxine (SYNTHROID) 50 MCG tablet, before breakfast. , Disp: , Rfl:      pentoxifylline (TRENTAL) 400 mg TbSR, 1 po tid, Disp: 90 tablet, Rfl: 1    pyridoxine, vitamin B6, (B-6) 100 MG Tab, Take 50 mg by mouth once daily., Disp: , Rfl:           Objective:   Vitals:  Blood pressure 123/69, pulse 73, temperature 97.4 °F (36.3 °C), resp. rate 16, weight 93.1 kg (205 lb 4.8 oz).    Physical Examination: This is my last exam.  She did not agree to examination today.  She did not feel exam was necessary.   GEN: no apparent distress, comfortable  HEAD: atraumatic and normocephalic  EYES: no pallor, no icterus  ENT:  no pharyngeal erythema, external ears WNL; no nasal discharge  NECK: no masses, thyroid normal, trachea midline, no LAD/LN's, supple  CV: RRR with no murmur; normal pulse; normal S1 and S2; no pedal edema  CHEST: Normal respiratory effort; CTAB; normal breath sounds; no wheeze or crackles  ABDOM: nontender and nondistended; soft;  no rebound/guarding, incisions healing  MUSC/Skeletal: ROM normal; no crepitus; joints normal; no deformities   EXTREM: no clubbing, cyanosis, inflammation or swelling  SKIN:  Scabbed over lesions noted at the lower legs, with chronic stasis change noted  : no cvat  NEURO: grossly intact; motor/sensory WNL;  no tremors  PSYCH: normal mood, affect and behavior  LYMPH: normal cervical, supraclavicular, axillary and groin LN's  BREASTS: L & R no palpable mass    Path report as above.  1/10/23 mamm negative  Dexa scan osteopenia    Assessment:   (1) 78 y.o. female with diagnosis of WBC 13,800. Gradually increasing from 8,900 since 2/2017.  Slight increased monocytes.  On repeat determination the WBC 13,000 Now.  B6 was low at 2.7.  Began B6 50 mg daily.  Defer bone marrow exam for now.    (2)Consider leukemoid reaction, myeloproliferative syndrome including CML and CMML.    (3) 2 first degree relatives with Breast Cancer.  Discussed BRCA 1&2 testing.  Her insurance carrier would not authorize BRCA 1 or 2.    (4)TSH increased 5.43, Dr. Valadez to review  thyroid status,  He started her on Synthroid.    (5)neuropathy sx at R anterior thigh.  Dr. Valadez aware, and to evaluate later, when pt agrees.    (6)R renal Ca, Stage 3 dx, S/P radical nephrectomy.  Worked on financial aid  for Sutent 25 mg 1 po daily for 4/6 weeks x's 1 year.  Increase dosage as tolerated.  She could not afford sutent, co pay.  She decided not to take the Sutent.    Check CAT of chest/abdomin/Pelvis 4/2024.    RTC late 4/2024.

## 2024-04-11 ENCOUNTER — TELEPHONE (OUTPATIENT)
Dept: HEMATOLOGY/ONCOLOGY | Facility: CLINIC | Age: 79
End: 2024-04-11

## 2024-04-11 DIAGNOSIS — C64.1 RENAL CELL CANCER, RIGHT: Primary | ICD-10-CM

## 2024-04-16 ENCOUNTER — TELEPHONE (OUTPATIENT)
Dept: HEMATOLOGY/ONCOLOGY | Facility: CLINIC | Age: 79
End: 2024-04-16

## 2024-04-22 ENCOUNTER — TELEPHONE (OUTPATIENT)
Dept: HEMATOLOGY/ONCOLOGY | Facility: CLINIC | Age: 79
End: 2024-04-22

## 2024-04-26 ENCOUNTER — TELEPHONE (OUTPATIENT)
Dept: HEMATOLOGY/ONCOLOGY | Facility: CLINIC | Age: 79
End: 2024-04-26

## 2024-04-29 ENCOUNTER — OFFICE VISIT (OUTPATIENT)
Dept: HEMATOLOGY/ONCOLOGY | Facility: CLINIC | Age: 79
End: 2024-04-29
Payer: MEDICARE

## 2024-04-29 ENCOUNTER — TELEPHONE (OUTPATIENT)
Dept: HEMATOLOGY/ONCOLOGY | Facility: CLINIC | Age: 79
End: 2024-04-29

## 2024-04-29 VITALS
DIASTOLIC BLOOD PRESSURE: 67 MMHG | RESPIRATION RATE: 16 BRPM | HEART RATE: 67 BPM | BODY MASS INDEX: 40.28 KG/M2 | TEMPERATURE: 98 F | SYSTOLIC BLOOD PRESSURE: 125 MMHG | WEIGHT: 213.19 LBS

## 2024-04-29 DIAGNOSIS — L08.0 PYODERMA: ICD-10-CM

## 2024-04-29 DIAGNOSIS — C64.1 RENAL CELL CANCER, RIGHT: Primary | ICD-10-CM

## 2024-04-29 PROCEDURE — 1126F AMNT PAIN NOTED NONE PRSNT: CPT | Mod: CPTII,S$GLB,, | Performed by: INTERNAL MEDICINE

## 2024-04-29 PROCEDURE — 1101F PT FALLS ASSESS-DOCD LE1/YR: CPT | Mod: CPTII,S$GLB,, | Performed by: INTERNAL MEDICINE

## 2024-04-29 PROCEDURE — 1159F MED LIST DOCD IN RCRD: CPT | Mod: CPTII,S$GLB,, | Performed by: INTERNAL MEDICINE

## 2024-04-29 PROCEDURE — 3288F FALL RISK ASSESSMENT DOCD: CPT | Mod: CPTII,S$GLB,, | Performed by: INTERNAL MEDICINE

## 2024-04-29 PROCEDURE — 99214 OFFICE O/P EST MOD 30 MIN: CPT | Mod: S$GLB,,, | Performed by: INTERNAL MEDICINE

## 2024-04-29 PROCEDURE — 3074F SYST BP LT 130 MM HG: CPT | Mod: CPTII,S$GLB,, | Performed by: INTERNAL MEDICINE

## 2024-04-29 PROCEDURE — 3078F DIAST BP <80 MM HG: CPT | Mod: CPTII,S$GLB,, | Performed by: INTERNAL MEDICINE

## 2024-04-29 NOTE — PROGRESS NOTES
"[facility] hematology Oncology in office subsequent encounter note  24    Subjective:      Patient ID:   Chen Levine  79 y.o. female  1945   Asha Mondragon [place], [place]      Chief Complaint:   Renal cancer    HPI:  79 y.o. female with increased WBC chronically, 13,000 recently.  She has spastic colitis hx with cramps.  HTN, asthma as a child.  Bladder cancer ,Dr. Jose Stahl, treated with surgery.  She has stress incontinence.  DVT hx in her 20's.    B6 was low, started on B 6 po.  Energy 5/10.    White blood cell count is running in the 13,400 range.    S/P neck fusion.  LBP with injections, residual numbness down R anterior Leg.  Partial hysterectomy.  Onset of menses age 13.  M0.    C/O arthritic sx L hand, wearing a splint, Dr. Ramirez.    CAT and U/S confirm 5.3 x 4.8 cm mass R kidney.  Per radiologist 80% probability of malignancy.  Referred to Dr. Mondragon of  for evaluation.  Her sister had nephrectomy for renal cancer.    She had  R renal mass surgery 19.     Renal Ca, 4 cm, renal vein involvement,  Margin clear, confined in kidney, grade 1.    She was to begin Sutent 25 mg daily adjuvantly and titrate up as tolerated.  But no financial aid or co pay assistance could be gotten.  She could not afford it, and went with observation.    Clinical Stage 3 dx.  CT scan at [facility] for 2021 of chest abdomen and pelvis was negative for recurrent or metastatic dx.  3.1 x 2.1 mass noted in stomach.  Check CAT 10/2022. [facility].    She had dilation of esophagus per Dr. Barnes 10/2021, sx decreased now.  Call Dr. Barnes for procedure note and path report.    She received 2 of 2 COVID vaccinations.  She has not had COVID infection.  She plans to take the COVID booster vaccine when available.  She has not had flu shot.  Daughter and ANTONI had covid 19, hospitalized, and recovered.    Hx of "boils" on legs, similar event 3 years ago.  Saw Dr." Evita.  Pyoderma lesions, enterococcus C/S 2017.  She saw Dr. Jama again.  C/S of lesions grew MRSA.     Status post hernia repair.  She is due to see Dr. Brothers for evaluation of PAD.    Dysphagia sx resolved after dilation procedure, Dr. Barnes.    She had a vascular stent placed L leg, Dr. Lal, now getting lymphedema Rx.  Renal cancer, now 4 years out.  CT CAP 4/13/23 negative for cancer.  Check CT 4/2024.    Smoked 1/2 ppd x's 57 years.  218#.  She does not want referral to smoking clinic.ETOH no.  Allergy Phenergan, PCN, sulfa.  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    Mom breast Ca, macular degeneration.  Mom is 97 y/o. Dad CVA.  Sister x's 2, LBP.  1 sister renal cancer.  Daughter Breast cancer, epilepsy, heart dx.  Daughter hypothyroid dx., cholesterol, 40# benign ovarian tumor.      ROS:   GEN: normal without any fever, night sweats or weight loss  HEENT: normal with no HA's, sore throat, stiff neck, changes in vision  CV: normal with no CP, SOB, PND, VIDES or orthopnea  PULM: normal with no SOB, cough, hemoptysis, sputum or pleuritic pain  GI: normal with no abdominal pain, nausea, vomiting, constipation, diarrhea, melanotic stools, BRBPR, or hematemesis  : See HPI  BREAST: normal with no mass, discharge, pain  SKIN: normal with no rash, erythema, bruising, or swelling     Past Medical History:   Diagnosis Date    Anxiety     Asthma     childhood currently resolved    Borderline diabetes     Cancer 1984    bladder    Chronic venous stasis     Colon polyps     Deep venous thrombosis 1970'S    In the leg    Degenerative disc disease at L5-S1 level     Depression     Hyperlipidemia     Hypertension     Obesity     Overactive bladder     Pyoderma [id number]    No histological diagnosis    Renal cell cancer, right 09/2019    R nephrostomy    Wears partial dentures     UPPER PARTIAL     Past Surgical History:   Procedure Laterality Date    BLADDER SURGERY  1985    c-spine fusion       EPIDURAL STEROID INJECTION  2019    x2     HYSTERECTOMY  1977    ROBOT-ASSISTED LAPAROSCOPIC NEPHRECTOMY Right 9/18/2019    Procedure: ROBOTIC NEPHRECTOMY;  Surgeon: Manny Mondragon MD;  Location: [facility] OR;  Service: Urology;  Laterality: Right;    ROBOT-ASSISTED LAPAROSCOPIC REPAIR OF INCISIONAL HERNIA N/A 8/31/2020    Procedure: ROBOTIC REPAIR, HERNIA, INCISIONAL;  Surgeon: Agusto Jaramillo MD;  Location: [facility] OR;  Service: General;  Laterality: N/A;    SPINE SURGERY  1987       Review of patient's allergies indicates:   Allergen Reactions    Penicillins Itching and Rash    Promethazine Itching and Rash    Sulfa (sulfonamide antibiotics) Itching and Rash           Current Outpatient Medications:     acetaminophen (TYLENOL) 325 MG tablet, Take 2 tablets (650 mg total) by mouth every 6 (six) hours as needed for Pain., Disp: , Rfl: 0    atorvastatin (LIPITOR) 20 MG tablet, Take 1 tablet (20 mg total) by mouth Daily., Disp: 90 tablet, Rfl: 1    clobetasoL (TEMOVATE) 0.05 % cream, APPLY  CREAM TOPICALLY TO AFFECTED AREA TWICE DAILY, Disp: 60 g, Rfl: 0    diltiaZEM (CARDIZEM) 90 MG tablet, Take 1 tablet (90 mg total) by mouth 2 (two) times daily., Disp: 180 tablet, Rfl: 1    escitalopram oxalate (LEXAPRO) 20 MG tablet, , Disp: , Rfl:     flurazepam (DALMANE) 15 MG Cap, TAKE 1 TO 2 CAPSULES BY MOUTH AT BEDTIME AS NEEDED FOR SLEEP, Disp: , Rfl:     furosemide (LASIX) 40 MG tablet, Take 1 tablet (40 mg total) by mouth 3 (three) times a week. (Patient taking differently: Take 40 mg by mouth once daily.), Disp: 90 tablet, Rfl: 1    gabapentin (NEURONTIN) 600 MG tablet, Take 600 mg by mouth 2 (two) times daily. , Disp: , Rfl:     HYDROcodone-acetaminophen (NORCO) 5-325 mg per tablet, Take 2 tablets by mouth every 6 (six) hours as needed for Pain., Disp: 50 tablet, Rfl: 0    ibuprofen (ADVIL,MOTRIN) 800 MG tablet, , Disp: , Rfl:     levothyroxine (SYNTHROID) 50 MCG tablet, before breakfast. , Disp: , Rfl:      pentoxifylline (TRENTAL) 400 mg TbSR, 1 po tid, Disp: 90 tablet, Rfl: 1    pyridoxine, vitamin B6, (B-6) 100 MG Tab, Take 50 mg by mouth once daily., Disp: , Rfl:           Objective:   Vitals:  Blood pressure 125/67, pulse 67, temperature 97.9 °F (36.6 °C), resp. rate 16, weight 96.7 kg (213 lb 3.2 oz).    Physical Examination: This is my last exam.  She did not agree to examination today.  She did not feel exam was necessary.   GEN: no apparent distress, comfortable  HEAD: atraumatic and normocephalic  EYES: no pallor, no icterus  ENT:  no pharyngeal erythema, external ears WNL; no nasal discharge  NECK: no masses, thyroid normal, trachea midline, no LAD/LN's, supple  CV: RRR with no murmur; normal pulse; normal S1 and S2; no pedal edema  CHEST: Normal respiratory effort; CTAB; normal breath sounds; no wheeze or crackles  ABDOM: nontender and nondistended; soft;  no rebound/guarding, incisions healing  MUSC/Skeletal: ROM normal; no crepitus; joints normal; no deformities   EXTREM: no clubbing, cyanosis, inflammation or swelling  SKIN:  Scabbed over lesions noted at the lower legs, with chronic stasis change noted  : no cvat  NEURO: grossly intact; motor/sensory WNL;  no tremors  PSYCH: normal mood, affect and behavior  LYMPH: normal cervical, supraclavicular, axillary and groin LN's  BREASTS: L & R no palpable mass    Path report as above.  1/10/23 mamm negative  Dexa scan osteopenia    CT CAP esophageal thickening, post nephrectomy, no mass or LN seen. 4/23/24.    Assessment:   (1) 79 y.o. female with diagnosis of WBC 13,800. Gradually increasing from 8,900 since 2/2017.  Slight increased monocytes.  On repeat determination the WBC 13,000 Now.  B6 was low at 2.7.  Began B6 50 mg daily.  Defer bone marrow exam for now.    (2)Consider leukemoid reaction, myeloproliferative syndrome including CML and CMML.    (3) 2 first degree relatives with Breast Cancer.  Discussed BRCA 1&2 testing.  Her insurance carrier would  not authorize BRCA 1 or 2.    (4)TSH increased 5.43, Dr. Ramirez to review thyroid status,  He started her on Synthroid.    (5)neuropathy sx at R anterior thigh.  Dr. Ramirez aware, and to evaluate later, when pt agrees.    (6)R renal Ca, Stage 3 dx, S/P radical nephrectomy.  Worked on financial aid  for Sutent 25 mg 1 po daily for 4/6 weeks x's 1 year.  Increase dosage as tolerated.  She could not afford sutent, co pay.  She decided not to take the Sutent.    Checked CAT of chest/abdomin/Pelvis 4/2024.  VIJI.  The CT scan showed esophageal thickening, will discuss with Dr. Barnes, does she need EGD?    She has pre DM, and 213#, she is interested in Ozempic trial, she plans to discuss with Dr. Ramirez in follow up.    RTC 6 months.

## 2024-04-29 NOTE — LETTER
April 29, 2024        Ethan Ramirez IV, MD  [address]   [place] [address]             [facility] - Hematology Oncology  [address]  [facility] [address]  Phone: [phone]  Fax: [phone]   Patient: Chen Levine   MR Number: [id number]   YOB: 1945   Date of Visit: 4/29/2024       Dear Dr. Ramirez:    Thank you for referring Chen Levine to me for evaluation. Below are the relevant portions of my assessment and plan of care.            If you have questions, please do not hesitate to call me. I look forward to following Chen along with you.    Sincerely,      CARMEN Nunes MD           CC    No Recipients

## 2024-08-12 ENCOUNTER — TELEPHONE (OUTPATIENT)
Dept: ORTHOPEDICS | Facility: CLINIC | Age: 79
End: 2024-08-12
Payer: MEDICARE

## 2024-10-30 ENCOUNTER — OFFICE VISIT (OUTPATIENT)
Facility: CLINIC | Age: 79
End: 2024-10-30
Payer: MEDICARE

## 2024-10-30 VITALS
BODY MASS INDEX: 38.68 KG/M2 | DIASTOLIC BLOOD PRESSURE: 58 MMHG | HEART RATE: 63 BPM | RESPIRATION RATE: 18 BRPM | WEIGHT: 210.19 LBS | SYSTOLIC BLOOD PRESSURE: 125 MMHG | TEMPERATURE: 98 F | HEIGHT: 62 IN

## 2024-10-30 DIAGNOSIS — Z86.2 HISTORY OF LEUKOCYTOSIS: ICD-10-CM

## 2024-10-30 DIAGNOSIS — Z90.5 H/O RIGHT NEPHRECTOMY: ICD-10-CM

## 2024-10-30 DIAGNOSIS — E03.9 ACQUIRED HYPOTHYROIDISM: ICD-10-CM

## 2024-10-30 DIAGNOSIS — C64.1 RENAL CELL CANCER, RIGHT: Primary | ICD-10-CM

## 2024-10-30 PROCEDURE — 3074F SYST BP LT 130 MM HG: CPT | Mod: CPTII,S$GLB,, | Performed by: INTERNAL MEDICINE

## 2024-10-30 PROCEDURE — 99999 PR PBB SHADOW E&M-EST. PATIENT-LVL V: CPT | Mod: PBBFAC,,, | Performed by: INTERNAL MEDICINE

## 2024-10-30 PROCEDURE — 99215 OFFICE O/P EST HI 40 MIN: CPT | Mod: S$GLB,,, | Performed by: INTERNAL MEDICINE

## 2024-10-30 PROCEDURE — 3078F DIAST BP <80 MM HG: CPT | Mod: CPTII,S$GLB,, | Performed by: INTERNAL MEDICINE

## 2024-10-30 PROCEDURE — 1159F MED LIST DOCD IN RCRD: CPT | Mod: CPTII,S$GLB,, | Performed by: INTERNAL MEDICINE

## 2024-10-30 PROCEDURE — G2211 COMPLEX E/M VISIT ADD ON: HCPCS | Mod: S$GLB,,, | Performed by: INTERNAL MEDICINE

## 2024-10-30 PROCEDURE — 1101F PT FALLS ASSESS-DOCD LE1/YR: CPT | Mod: CPTII,S$GLB,, | Performed by: INTERNAL MEDICINE

## 2024-10-30 PROCEDURE — 3288F FALL RISK ASSESSMENT DOCD: CPT | Mod: CPTII,S$GLB,, | Performed by: INTERNAL MEDICINE

## 2024-10-30 PROCEDURE — 1126F AMNT PAIN NOTED NONE PRSNT: CPT | Mod: CPTII,S$GLB,, | Performed by: INTERNAL MEDICINE

## 2024-10-30 RX ORDER — TRIAMCINOLONE ACETONIDE 1 MG/G
CREAM TOPICAL 2 TIMES DAILY PRN
COMMUNITY

## 2024-10-30 RX ORDER — SEMAGLUTIDE 0.68 MG/ML
0.5 INJECTION, SOLUTION SUBCUTANEOUS
COMMUNITY
Start: 2024-10-29

## 2024-10-30 RX ORDER — BUMETANIDE 2 MG/1
1 TABLET ORAL DAILY
COMMUNITY
Start: 2024-09-10

## 2024-10-30 RX ORDER — CLOPIDOGREL BISULFATE 75 MG/1
1 TABLET ORAL DAILY
COMMUNITY
Start: 2024-09-16

## 2025-03-03 ENCOUNTER — TELEPHONE (OUTPATIENT)
Facility: CLINIC | Age: 80
End: 2025-03-03
Payer: MEDICARE

## (undated) DEVICE — UNDERGLOVES BIOGEL PI SZ 7 LF

## (undated) DEVICE — SUT PDS II 0 CT-1 VIL MONO

## (undated) DEVICE — SEE MEDLINE ITEM 157116

## (undated) DEVICE — SYR 30CC LUER LOCK

## (undated) DEVICE — SPONGE LAP 4X18 PREWASHED

## (undated) DEVICE — GLOVE SURGEONS ULTRA TOUCH 6.5

## (undated) DEVICE — TROCAR ENDOPATH XCEL 12MM 15CM

## (undated) DEVICE — DRAPE ABDOMINAL TIBURON 14X11

## (undated) DEVICE — SUT MONOCRYL 4-0 PS-2

## (undated) DEVICE — PACK BASIC

## (undated) DEVICE — KIT ROBOTIC 3 ARM DA VINCI SI

## (undated) DEVICE — SEE MEDLINE ITEM 146292

## (undated) DEVICE — SOL CLEARIFY VISUALIZATION LAP

## (undated) DEVICE — STAPLER ECHELON FLEX 35MM 32CM

## (undated) DEVICE — SYR 10CC LUER LOCK

## (undated) DEVICE — UNDERGLOVE BIOGEL PI SZ 6.5 LF

## (undated) DEVICE — SEALS

## (undated) DEVICE — LINER SUCTION 3000CC

## (undated) DEVICE — SUT 0 36IN COATED VICRYL VI

## (undated) DEVICE — SEE MEDLINE ITEM 157117

## (undated) DEVICE — APPLICATOR CHLORAPREP ORN 26ML

## (undated) DEVICE — BLADE SURG CARBON STEEL SZ11

## (undated) DEVICE — SEE MEDLINE ITEM 152622

## (undated) DEVICE — DEVICE ANC SW STAT FOLEY 6-24

## (undated) DEVICE — SOL IRR NACL .9% 3000ML

## (undated) DEVICE — ADHESIVE DERMABOND ADVANCED

## (undated) DEVICE — NDL SAFETY 25G X 1.5 ECLIPSE

## (undated) DEVICE — TROCAR ENDO KII 5X150MM

## (undated) DEVICE — CLIP HEMO-LOK MLX LARGE LF

## (undated) DEVICE — DRESSING ABSRBNT ISLAND 3.6X8

## (undated) DEVICE — SUT CTD VICRYL VIL BR SH 27

## (undated) DEVICE — IRRIGATOR SUCTION W/TIP

## (undated) DEVICE — GLOVE SURG ULTRA TOUCH 7

## (undated) DEVICE — ECHELON FLEX POWERED VAS STPLR

## (undated) DEVICE — SOL ELECTROLUBE ANTI-STIC

## (undated) DEVICE — COVER TIP CURVED SCISSORS XI

## (undated) DEVICE — LOOP VESSEL YELLOW MAXI

## (undated) DEVICE — TROCAR ENDO KII FIOS 12X150MM

## (undated) DEVICE — SUT 3-0 VICRYL / SH (J416)

## (undated) DEVICE — ENDOAPTH VAS RELOAD WHITE 2.5

## (undated) DEVICE — SLEEVE SCD EXPRESS CALF MEDIUM

## (undated) DEVICE — TAPE SILK 3IN

## (undated) DEVICE — Device

## (undated) DEVICE — SCISSOR 5MMX35CM DIRECT DRIVE

## (undated) DEVICE — RETAINER VISCERA STER DISP LRG

## (undated) DEVICE — BAG INZII TISS RETRV 12/15MM

## (undated) DEVICE — SEALER LIGASURE LAP 37CM 5MM

## (undated) DEVICE — KIT CATH SURESTEP 350ML URIN18

## (undated) DEVICE — PACK CUSTOM UNIV BASIN SLI

## (undated) DEVICE — ELECTRODE REM PLYHSV RETURN 9

## (undated) DEVICE — HEMOSTAT SURGICEL 4X8IN

## (undated) DEVICE — CORD BIPOLAR 12 FOOT